# Patient Record
Sex: FEMALE | Race: WHITE | NOT HISPANIC OR LATINO | Employment: OTHER | URBAN - METROPOLITAN AREA
[De-identification: names, ages, dates, MRNs, and addresses within clinical notes are randomized per-mention and may not be internally consistent; named-entity substitution may affect disease eponyms.]

---

## 2019-05-20 ENCOUNTER — EVALUATION (OUTPATIENT)
Dept: OCCUPATIONAL THERAPY | Facility: CLINIC | Age: 79
End: 2019-05-20
Payer: MEDICARE

## 2019-05-20 DIAGNOSIS — M25.531 RIGHT WRIST PAIN: Primary | ICD-10-CM

## 2019-05-20 PROCEDURE — 97535 SELF CARE MNGMENT TRAINING: CPT

## 2019-05-20 PROCEDURE — 97110 THERAPEUTIC EXERCISES: CPT

## 2019-05-20 PROCEDURE — 97166 OT EVAL MOD COMPLEX 45 MIN: CPT

## 2019-05-22 ENCOUNTER — OFFICE VISIT (OUTPATIENT)
Dept: OCCUPATIONAL THERAPY | Facility: CLINIC | Age: 79
End: 2019-05-22
Payer: MEDICARE

## 2019-05-22 DIAGNOSIS — M25.531 RIGHT WRIST PAIN: Primary | ICD-10-CM

## 2019-05-22 PROCEDURE — 97140 MANUAL THERAPY 1/> REGIONS: CPT

## 2019-05-22 PROCEDURE — 97150 GROUP THERAPEUTIC PROCEDURES: CPT

## 2019-05-29 ENCOUNTER — OFFICE VISIT (OUTPATIENT)
Dept: OCCUPATIONAL THERAPY | Facility: CLINIC | Age: 79
End: 2019-05-29
Payer: MEDICARE

## 2019-05-29 DIAGNOSIS — M25.531 RIGHT WRIST PAIN: Primary | ICD-10-CM

## 2019-05-29 PROCEDURE — 97150 GROUP THERAPEUTIC PROCEDURES: CPT

## 2019-05-29 PROCEDURE — 97110 THERAPEUTIC EXERCISES: CPT

## 2019-05-29 PROCEDURE — 97140 MANUAL THERAPY 1/> REGIONS: CPT

## 2019-05-31 ENCOUNTER — OFFICE VISIT (OUTPATIENT)
Dept: OCCUPATIONAL THERAPY | Facility: CLINIC | Age: 79
End: 2019-05-31
Payer: MEDICARE

## 2019-05-31 DIAGNOSIS — M25.531 RIGHT WRIST PAIN: Primary | ICD-10-CM

## 2019-05-31 PROCEDURE — 97140 MANUAL THERAPY 1/> REGIONS: CPT

## 2019-05-31 PROCEDURE — 97110 THERAPEUTIC EXERCISES: CPT

## 2019-06-03 ENCOUNTER — OFFICE VISIT (OUTPATIENT)
Dept: OCCUPATIONAL THERAPY | Facility: CLINIC | Age: 79
End: 2019-06-03
Payer: MEDICARE

## 2019-06-03 DIAGNOSIS — M25.531 RIGHT WRIST PAIN: Primary | ICD-10-CM

## 2019-06-03 PROCEDURE — 97110 THERAPEUTIC EXERCISES: CPT

## 2019-06-05 ENCOUNTER — OFFICE VISIT (OUTPATIENT)
Dept: OCCUPATIONAL THERAPY | Facility: CLINIC | Age: 79
End: 2019-06-05
Payer: MEDICARE

## 2019-06-05 DIAGNOSIS — M25.531 RIGHT WRIST PAIN: Primary | ICD-10-CM

## 2019-06-05 PROCEDURE — 97140 MANUAL THERAPY 1/> REGIONS: CPT

## 2019-06-05 PROCEDURE — 97110 THERAPEUTIC EXERCISES: CPT

## 2019-06-12 ENCOUNTER — OFFICE VISIT (OUTPATIENT)
Dept: OCCUPATIONAL THERAPY | Facility: CLINIC | Age: 79
End: 2019-06-12
Payer: MEDICARE

## 2019-06-12 DIAGNOSIS — M25.531 RIGHT WRIST PAIN: Primary | ICD-10-CM

## 2019-06-12 PROCEDURE — 97110 THERAPEUTIC EXERCISES: CPT

## 2019-06-14 ENCOUNTER — OFFICE VISIT (OUTPATIENT)
Dept: OCCUPATIONAL THERAPY | Facility: CLINIC | Age: 79
End: 2019-06-14
Payer: MEDICARE

## 2019-06-14 DIAGNOSIS — M25.531 RIGHT WRIST PAIN: Primary | ICD-10-CM

## 2019-06-14 PROCEDURE — 97110 THERAPEUTIC EXERCISES: CPT

## 2019-06-17 ENCOUNTER — EVALUATION (OUTPATIENT)
Dept: OCCUPATIONAL THERAPY | Facility: CLINIC | Age: 79
End: 2019-06-17
Payer: MEDICARE

## 2019-06-17 DIAGNOSIS — M25.531 RIGHT WRIST PAIN: Primary | ICD-10-CM

## 2019-06-17 PROCEDURE — 97110 THERAPEUTIC EXERCISES: CPT

## 2019-06-17 PROCEDURE — 97168 OT RE-EVAL EST PLAN CARE: CPT

## 2019-06-19 ENCOUNTER — TRANSCRIBE ORDERS (OUTPATIENT)
Dept: PHYSICAL THERAPY | Facility: CLINIC | Age: 79
End: 2019-06-19

## 2019-06-19 DIAGNOSIS — M25.531 RIGHT WRIST PAIN: Primary | ICD-10-CM

## 2021-11-09 ENCOUNTER — OFFICE VISIT (OUTPATIENT)
Dept: FAMILY MEDICINE CLINIC | Facility: CLINIC | Age: 81
End: 2021-11-09
Payer: MEDICARE

## 2021-11-09 VITALS
HEART RATE: 64 BPM | HEIGHT: 64 IN | SYSTOLIC BLOOD PRESSURE: 122 MMHG | DIASTOLIC BLOOD PRESSURE: 84 MMHG | WEIGHT: 164.6 LBS | TEMPERATURE: 97.8 F | OXYGEN SATURATION: 98 % | BODY MASS INDEX: 28.1 KG/M2

## 2021-11-09 DIAGNOSIS — Z17.0 MALIGNANT NEOPLASM OF UPPER-OUTER QUADRANT OF RIGHT BREAST IN FEMALE, ESTROGEN RECEPTOR POSITIVE (HCC): ICD-10-CM

## 2021-11-09 DIAGNOSIS — R00.1 BRADYCARDIA: ICD-10-CM

## 2021-11-09 DIAGNOSIS — Z00.00 MEDICARE ANNUAL WELLNESS VISIT, INITIAL: ICD-10-CM

## 2021-11-09 DIAGNOSIS — I10 ESSENTIAL HYPERTENSION: Primary | ICD-10-CM

## 2021-11-09 DIAGNOSIS — C50.411 MALIGNANT NEOPLASM OF UPPER-OUTER QUADRANT OF RIGHT BREAST IN FEMALE, ESTROGEN RECEPTOR POSITIVE (HCC): ICD-10-CM

## 2021-11-09 PROCEDURE — 99204 OFFICE O/P NEW MOD 45 MIN: CPT | Performed by: FAMILY MEDICINE

## 2021-11-09 PROCEDURE — 1123F ACP DISCUSS/DSCN MKR DOCD: CPT | Performed by: FAMILY MEDICINE

## 2021-11-09 PROCEDURE — 93000 ELECTROCARDIOGRAM COMPLETE: CPT | Performed by: FAMILY MEDICINE

## 2021-11-09 PROCEDURE — G0438 PPPS, INITIAL VISIT: HCPCS | Performed by: FAMILY MEDICINE

## 2021-11-09 RX ORDER — METOPROLOL SUCCINATE 25 MG/1
25 TABLET, EXTENDED RELEASE ORAL DAILY
Qty: 30 TABLET | Refills: 3 | Status: SHIPPED | OUTPATIENT
Start: 2021-11-09 | End: 2022-02-21

## 2021-11-09 RX ORDER — METOPROLOL SUCCINATE 50 MG/1
50 TABLET, EXTENDED RELEASE ORAL DAILY
Qty: 30 TABLET | Refills: 3
Start: 2021-11-09 | End: 2021-11-09

## 2021-11-09 RX ORDER — ANASTROZOLE 1 MG/1
1 TABLET ORAL DAILY
COMMUNITY
Start: 2021-10-15

## 2021-11-09 RX ORDER — RAMIPRIL 10 MG/1
10 CAPSULE ORAL DAILY
COMMUNITY
End: 2022-03-08 | Stop reason: SDUPTHER

## 2021-11-09 RX ORDER — METOPROLOL TARTRATE 50 MG/1
50 TABLET, FILM COATED ORAL EVERY 12 HOURS SCHEDULED
COMMUNITY
End: 2021-11-09 | Stop reason: SDUPTHER

## 2021-11-09 RX ORDER — ASPIRIN 81 MG/1
81 TABLET ORAL DAILY
COMMUNITY

## 2021-11-09 RX ORDER — ATORVASTATIN CALCIUM 20 MG/1
20 TABLET, FILM COATED ORAL DAILY
COMMUNITY
End: 2022-03-08 | Stop reason: SDUPTHER

## 2021-11-09 RX ORDER — MULTIVITAMIN
1 CAPSULE ORAL DAILY
COMMUNITY

## 2021-11-09 RX ORDER — HYDROCHLOROTHIAZIDE 12.5 MG/1
12.5 TABLET ORAL DAILY
COMMUNITY
End: 2022-03-08 | Stop reason: SDUPTHER

## 2021-12-17 ENCOUNTER — TELEPHONE (OUTPATIENT)
Dept: FAMILY MEDICINE CLINIC | Facility: CLINIC | Age: 81
End: 2021-12-17

## 2022-02-19 DIAGNOSIS — I10 ESSENTIAL HYPERTENSION: ICD-10-CM

## 2022-02-21 RX ORDER — METOPROLOL SUCCINATE 25 MG/1
TABLET, EXTENDED RELEASE ORAL
Qty: 30 TABLET | Refills: 3 | Status: SHIPPED | OUTPATIENT
Start: 2022-02-21 | End: 2022-05-11 | Stop reason: SDUPTHER

## 2022-03-08 DIAGNOSIS — E78.5 HYPERLIPIDEMIA, UNSPECIFIED HYPERLIPIDEMIA TYPE: Primary | ICD-10-CM

## 2022-03-08 DIAGNOSIS — R00.1 BRADYCARDIA: ICD-10-CM

## 2022-03-08 DIAGNOSIS — I10 ESSENTIAL HYPERTENSION: ICD-10-CM

## 2022-03-08 RX ORDER — RAMIPRIL 10 MG/1
10 CAPSULE ORAL DAILY
Qty: 30 CAPSULE | Refills: 2 | Status: SHIPPED | OUTPATIENT
Start: 2022-03-08 | End: 2022-05-11 | Stop reason: SDUPTHER

## 2022-03-08 RX ORDER — HYDROCHLOROTHIAZIDE 12.5 MG/1
12.5 TABLET ORAL DAILY
Qty: 30 TABLET | Refills: 2 | Status: SHIPPED | OUTPATIENT
Start: 2022-03-08 | End: 2022-05-11 | Stop reason: SDUPTHER

## 2022-03-08 RX ORDER — ATORVASTATIN CALCIUM 20 MG/1
20 TABLET, FILM COATED ORAL DAILY
Qty: 30 TABLET | Refills: 2 | Status: SHIPPED | OUTPATIENT
Start: 2022-03-08 | End: 2022-05-11 | Stop reason: SDUPTHER

## 2022-04-11 ENCOUNTER — TELEPHONE (OUTPATIENT)
Dept: FAMILY MEDICINE CLINIC | Facility: CLINIC | Age: 82
End: 2022-04-11

## 2022-04-11 DIAGNOSIS — E78.5 HYPERLIPIDEMIA, UNSPECIFIED HYPERLIPIDEMIA TYPE: Primary | ICD-10-CM

## 2022-04-11 DIAGNOSIS — D64.9 ANEMIA, UNSPECIFIED TYPE: ICD-10-CM

## 2022-04-11 DIAGNOSIS — Z13.1 SCREENING FOR DIABETES MELLITUS: ICD-10-CM

## 2022-05-07 LAB
ALBUMIN SERPL-MCNC: 4.3 G/DL (ref 3.6–4.6)
ALBUMIN/GLOB SERPL: 2 {RATIO} (ref 1.2–2.2)
ALP SERPL-CCNC: 60 IU/L (ref 44–121)
ALT SERPL-CCNC: 34 IU/L (ref 0–32)
AST SERPL-CCNC: 35 IU/L (ref 0–40)
BASOPHILS # BLD AUTO: 0.1 X10E3/UL (ref 0–0.2)
BASOPHILS NFR BLD AUTO: 1 %
BILIRUB SERPL-MCNC: 0.2 MG/DL (ref 0–1.2)
BUN SERPL-MCNC: 16 MG/DL (ref 8–27)
BUN/CREAT SERPL: 21 (ref 12–28)
CALCIUM SERPL-MCNC: 9.3 MG/DL (ref 8.7–10.3)
CHLORIDE SERPL-SCNC: 104 MMOL/L (ref 96–106)
CHOLEST SERPL-MCNC: 153 MG/DL (ref 100–199)
CO2 SERPL-SCNC: 25 MMOL/L (ref 20–29)
CREAT SERPL-MCNC: 0.78 MG/DL (ref 0.57–1)
EGFR: 76 ML/MIN/1.73
EOSINOPHIL # BLD AUTO: 0.1 X10E3/UL (ref 0–0.4)
EOSINOPHIL NFR BLD AUTO: 3 %
ERYTHROCYTE [DISTWIDTH] IN BLOOD BY AUTOMATED COUNT: 12.9 % (ref 11.7–15.4)
GLOBULIN SER-MCNC: 2.2 G/DL (ref 1.5–4.5)
GLUCOSE SERPL-MCNC: 105 MG/DL (ref 65–99)
HCT VFR BLD AUTO: 34.9 % (ref 34–46.6)
HDLC SERPL-MCNC: 42 MG/DL
HGB BLD-MCNC: 12 G/DL (ref 11.1–15.9)
IMM GRANULOCYTES # BLD: 0 X10E3/UL (ref 0–0.1)
IMM GRANULOCYTES NFR BLD: 0 %
LDLC SERPL CALC-MCNC: 92 MG/DL (ref 0–99)
LYMPHOCYTES # BLD AUTO: 1.5 X10E3/UL (ref 0.7–3.1)
LYMPHOCYTES NFR BLD AUTO: 29 %
MCH RBC QN AUTO: 29.3 PG (ref 26.6–33)
MCHC RBC AUTO-ENTMCNC: 34.4 G/DL (ref 31.5–35.7)
MCV RBC AUTO: 85 FL (ref 79–97)
MONOCYTES # BLD AUTO: 0.5 X10E3/UL (ref 0.1–0.9)
MONOCYTES NFR BLD AUTO: 9 %
NEUTROPHILS # BLD AUTO: 3 X10E3/UL (ref 1.4–7)
NEUTROPHILS NFR BLD AUTO: 58 %
PLATELET # BLD AUTO: 143 X10E3/UL (ref 150–450)
POTASSIUM SERPL-SCNC: 4.6 MMOL/L (ref 3.5–5.2)
PROT SERPL-MCNC: 6.5 G/DL (ref 6–8.5)
RBC # BLD AUTO: 4.09 X10E6/UL (ref 3.77–5.28)
SL AMB VLDL CHOLESTEROL CALC: 19 MG/DL (ref 5–40)
SODIUM SERPL-SCNC: 143 MMOL/L (ref 134–144)
TRIGL SERPL-MCNC: 103 MG/DL (ref 0–149)
WBC # BLD AUTO: 5.1 X10E3/UL (ref 3.4–10.8)

## 2022-05-11 ENCOUNTER — OFFICE VISIT (OUTPATIENT)
Dept: FAMILY MEDICINE CLINIC | Facility: CLINIC | Age: 82
End: 2022-05-11
Payer: MEDICARE

## 2022-05-11 VITALS
BODY MASS INDEX: 28.38 KG/M2 | HEART RATE: 49 BPM | TEMPERATURE: 96.6 F | HEIGHT: 64 IN | WEIGHT: 166.2 LBS | OXYGEN SATURATION: 98 % | SYSTOLIC BLOOD PRESSURE: 126 MMHG | DIASTOLIC BLOOD PRESSURE: 82 MMHG

## 2022-05-11 DIAGNOSIS — L98.9 LEG SKIN LESION, LEFT: Primary | ICD-10-CM

## 2022-05-11 DIAGNOSIS — R00.1 BRADYCARDIA: ICD-10-CM

## 2022-05-11 DIAGNOSIS — E28.39 MENOPAUSE OVARIAN FAILURE: ICD-10-CM

## 2022-05-11 DIAGNOSIS — I10 ESSENTIAL HYPERTENSION: ICD-10-CM

## 2022-05-11 DIAGNOSIS — D69.6 PLATELETS DECREASED (HCC): ICD-10-CM

## 2022-05-11 DIAGNOSIS — E78.5 HYPERLIPIDEMIA, UNSPECIFIED HYPERLIPIDEMIA TYPE: ICD-10-CM

## 2022-05-11 PROCEDURE — 99214 OFFICE O/P EST MOD 30 MIN: CPT | Performed by: FAMILY MEDICINE

## 2022-05-11 RX ORDER — TRAVOPROST OPHTHALMIC SOLUTION 0.04 MG/ML
1 SOLUTION OPHTHALMIC DAILY
COMMUNITY
Start: 2022-03-22 | End: 2022-05-11 | Stop reason: SDUPTHER

## 2022-05-11 RX ORDER — PNV NO.95/FERROUS FUM/FOLIC AC 28MG-0.8MG
TABLET ORAL
COMMUNITY
End: 2022-05-11 | Stop reason: SDUPTHER

## 2022-05-11 RX ORDER — ATORVASTATIN CALCIUM 20 MG/1
20 TABLET, FILM COATED ORAL DAILY
Qty: 90 TABLET | Refills: 3 | Status: SHIPPED | OUTPATIENT
Start: 2022-05-11 | End: 2022-08-09

## 2022-05-11 RX ORDER — ASPIRIN 81 MG/1
81 TABLET ORAL
COMMUNITY

## 2022-05-11 RX ORDER — TRAVOPROST OPHTHALMIC SOLUTION 0.04 MG/ML
SOLUTION OPHTHALMIC
COMMUNITY
Start: 2022-03-22

## 2022-05-11 RX ORDER — RAMIPRIL 10 MG/1
10 CAPSULE ORAL DAILY
Qty: 90 CAPSULE | Refills: 3 | Status: SHIPPED | OUTPATIENT
Start: 2022-05-11 | End: 2022-08-09

## 2022-05-11 RX ORDER — HYDROCHLOROTHIAZIDE 12.5 MG/1
12.5 TABLET ORAL DAILY
Qty: 90 TABLET | Refills: 3 | Status: SHIPPED | OUTPATIENT
Start: 2022-05-11 | End: 2022-08-09

## 2022-05-11 RX ORDER — METOPROLOL SUCCINATE 25 MG/1
25 TABLET, EXTENDED RELEASE ORAL DAILY
Qty: 90 TABLET | Refills: 3 | Status: SHIPPED | OUTPATIENT
Start: 2022-05-11 | End: 2022-08-03 | Stop reason: ALTCHOICE

## 2022-05-11 RX ORDER — PRENATAL 168/IRON/FOLIC/OMEGA3 27-800-235
CAPSULE ORAL
COMMUNITY

## 2022-05-11 RX ORDER — ATORVASTATIN CALCIUM 20 MG/1
TABLET, FILM COATED ORAL
COMMUNITY
End: 2022-05-11 | Stop reason: SDUPTHER

## 2022-05-11 NOTE — PROGRESS NOTES
Assessment/Plan:    No problem-specific Assessment & Plan notes found for this encounter  Diagnoses and all orders for this visit:    Leg skin lesion, left  Patient will F/U with dermatology for biopsy    Menopause ovarian failure  -     DXA bone density spine hip and pelvis; Future    Platelets decreased (HCC)  -     CBC and differential; Future    Bradycardia  -     Ambulatory Referral to Cardiology; Future    Essential hypertension  -     ramipril (ALTACE) 10 MG capsule; Take 1 capsule (10 mg total) by mouth in the morning   -     metoprolol succinate (TOPROL-XL) 25 mg 24 hr tablet; Take 1 tablet (25 mg total) by mouth in the morning   -     hydrochlorothiazide (HYDRODIURIL) 12 5 mg tablet; Take 1 tablet (12 5 mg total) by mouth in the morning  Hyperlipidemia, unspecified hyperlipidemia type  -     atorvastatin (LIPITOR) 20 mg tablet; Take 1 tablet (20 mg total) by mouth in the morning  Other orders  -     aspirin (ECOTRIN LOW STRENGTH) 81 mg EC tablet; Take 81 mg by mouth  -     Discontinue: atorvastatin (LIPITOR) 20 mg tablet; atorvastatin 20 mg tablet  -     Calcium-Phosphorus-Vitamin D (Citracal +D3) 250-107-500 MG-MG-UNIT CHEW; Chew  -     Discontinue: Prenatal Vit-Fe Fumarate-FA (Prenatal Vitamin) 27-0 8 MG TABS; Take by mouth  -     travoprost (TRAVATAN-Z) 0 004 % ophthalmic solution; instill 1 drop into both eyes BEFORE BEDTIME  -     Discontinue: travoprost (TRAVATAN-Z) 0 004 % ophthalmic solution; Apply 1 drop to eye in the morning  Follow up in 6 months    Subjective:      Patient ID: Alma Mejia is a 81 y o  female  Patient is here for skin lesion on her left leg that has been changing in color and in size  Also has a Hx of low heart rate denies any fatigue, SOB or weakness  Has hypertension denies any symptoms and takes her medications daily  Borderline low platelets no bleeding episodes        The following portions of the patient's history were reviewed and updated as appropriate:   She  has no past medical history on file  She   Patient Active Problem List    Diagnosis Date Noted    Platelets decreased ([facility] [address] ) 05/11/2022    Leg skin lesion, left 05/11/2022    [facility] upper[facility]outer [facility] right [facility], [facility]09[facility]    Essential hypertension 11/09/2021    Bradycardia 11/09/2021    Medicare annual wellness visit, initial 11/09/2021     She  has no past surgical history on file  Her family history is not on file  She  reports that she has never smoked  She has never used smokeless tobacco  No history on file for alcohol use and drug use  Current Outpatient Medications   Medication Sig Dispense Refill    anastrozole (ARIMIDEX) 1 mg tablet Take 1 mg by mouth daily      aspirin (ECOTRIN LOW STRENGTH) 81 mg EC tablet Take 81 mg by mouth daily      aspirin (ECOTRIN LOW STRENGTH) 81 mg EC tablet Take 81 mg by mouth      atorvastatin (LIPITOR) 20 mg tablet Take 1 tablet (20 mg total) by mouth in the morning  90 tablet 3    Calcium-Phosphorus-Vitamin D (Citracal +D3) 250-107-500 MG-MG-UNIT CHEW Chew      hydrochlorothiazide (HYDRODIURIL) 12 5 mg tablet Take 1 tablet (12 5 mg total) by mouth in the morning  90 tablet 3    metoprolol succinate (TOPROL-XL) 25 mg 24 hr tablet Take 1 tablet (25 mg total) by mouth in the morning  90 tablet 3    Multiple Vitamin (multivitamin) capsule Take 1 capsule by mouth daily      ramipril (ALTACE) 10 MG capsule Take 1 capsule (10 mg total) by mouth in the morning  90 capsule 3    travoprost (TRAVATAN-Z) 0 004 % ophthalmic solution instill 1 drop into both eyes BEFORE BEDTIME       No current facility-administered medications for this visit       Current Outpatient Medications on File Prior to Visit   Medication Sig    anastrozole (ARIMIDEX) 1 mg tablet Take 1 mg by mouth daily    aspirin (ECOTRIN LOW STRENGTH) 81 mg EC tablet Take 81 mg by mouth daily    aspirin (ECOTRIN LOW STRENGTH) 81 mg EC tablet Take 81 mg by mouth    Calcium-Phosphorus-Vitamin D (Citracal +D3) 250-107-500 MG-MG-UNIT CHEW Chew    Multiple Vitamin (multivitamin) capsule Take 1 capsule by mouth daily    travoprost (TRAVATAN-Z) 0 004 % ophthalmic solution instill 1 drop into both eyes BEFORE BEDTIME    [DISCONTINUED] atorvastatin (LIPITOR) 20 mg tablet Take 1 tablet (20 mg total) by mouth daily    [DISCONTINUED] hydrochlorothiazide (HYDRODIURIL) 12 5 mg tablet Take 1 tablet (12 5 mg total) by mouth daily    [DISCONTINUED] metoprolol succinate (TOPROL-XL) 25 mg 24 hr tablet take 1 tablet by mouth daily    [DISCONTINUED] ramipril (ALTACE) 10 MG capsule Take 1 capsule (10 mg total) by mouth daily    [DISCONTINUED] travoprost (TRAVATAN-Z) 0 004 % ophthalmic solution Apply 1 drop to eye in the morning   [DISCONTINUED] atorvastatin (LIPITOR) 20 mg tablet atorvastatin 20 mg tablet    [DISCONTINUED] Prenatal Vit-Fe Fumarate-FA (Prenatal Vitamin) 27-0 8 MG TABS Take by mouth     No current facility-administered medications on file prior to visit  She is allergic to brimonidine       Review of Systems   Constitutional: Negative for activity change, appetite change, fatigue and fever  HENT: Negative for congestion and ear discharge  Respiratory: Negative for cough and shortness of breath  Cardiovascular: Negative for chest pain and palpitations  Gastrointestinal: Negative for diarrhea and nausea  Musculoskeletal: Negative for arthralgias and back pain  Skin: Positive for color change  Negative for rash  Neurological: Negative for dizziness and headaches  Psychiatric/Behavioral: Negative for agitation and behavioral problems           Objective:      /82 (BP Location: Left arm, Patient Position: Sitting, Cuff Size: Large)   Pulse (!) 49   Temp (!) 96 6 °F (35 9 °C)   Ht 5' 4" (1 626 m)   Wt 75 4 kg (166 lb 3 2 oz)   SpO2 98%   BMI 28 53 kg/m²          Physical Exam  Constitutional: General: She is not in acute distress  Appearance: She is well-developed  She is not diaphoretic  HENT:      Head: Normocephalic and atraumatic  Nose: Nose normal    Eyes:      Conjunctiva/sclera: Conjunctivae normal       Pupils: Pupils are equal, round, and reactive to light  Cardiovascular:      Rate and Rhythm: Normal rate and regular rhythm  Heart sounds: Normal heart sounds  No murmur heard  Pulmonary:      Effort: Pulmonary effort is normal  No respiratory distress  Breath sounds: Normal breath sounds  No wheezing  Abdominal:      General: Bowel sounds are normal  There is no distension  Palpations: Abdomen is soft  Tenderness: There is no abdominal tenderness  Skin:     General: Skin is warm and dry  Findings: No erythema or rash  Comments: A slightly raised skin lesion noted on left leg   Neurological:      Mental Status: She is alert and oriented to person, place, and time

## 2022-05-23 ENCOUNTER — TELEPHONE (OUTPATIENT)
Dept: ADMINISTRATIVE | Facility: OTHER | Age: 82
End: 2022-05-23

## 2022-05-23 NOTE — TELEPHONE ENCOUNTER
Upon review of the In Basket request we have found that the patient has aged out of the measure and/or the document date is outside of the measure timeframe  Any additional questions or concerns should be emailed to the Practice Liaisons via [email or web address] email, please do not reply via In Basket      Thank you  Johanna Rae MA

## 2022-05-23 NOTE — TELEPHONE ENCOUNTER
----- Message from Meg Akhtar sent at 5/23/2022  8:39 AM EDT -----  Regarding: [employer] Request  05/23/22 8:39 AM    Hello, our patient Latia Billings has had Mammogram completed/performed  Please assist in updating the patient chart by pulling the Care Everywhere (CE) document  The date of service is 05/20/2022       Thank you,  Meg OLEARY

## 2022-05-23 NOTE — TELEPHONE ENCOUNTER
Upon review of the In Basket request we were able to locate, review, and update the patient chart as requested for Mammogram     Any additional questions or concerns should be emailed to the Practice Liaisons via [email or web address] email, please do not reply via In Basket      Thank you  Saadia Emerson MA

## 2022-06-28 ENCOUNTER — EVALUATION (OUTPATIENT)
Dept: PHYSICAL THERAPY | Facility: CLINIC | Age: 82
End: 2022-06-28
Payer: MEDICARE

## 2022-06-28 DIAGNOSIS — M54.50 CHRONIC LOW BACK PAIN, UNSPECIFIED BACK PAIN LATERALITY, UNSPECIFIED WHETHER SCIATICA PRESENT: ICD-10-CM

## 2022-06-28 DIAGNOSIS — G89.29 CHRONIC LOW BACK PAIN, UNSPECIFIED BACK PAIN LATERALITY, UNSPECIFIED WHETHER SCIATICA PRESENT: ICD-10-CM

## 2022-06-28 DIAGNOSIS — M54.16 RIGHT LUMBAR RADICULOPATHY: Primary | ICD-10-CM

## 2022-06-28 PROCEDURE — 97162 PT EVAL MOD COMPLEX 30 MIN: CPT

## 2022-06-28 PROCEDURE — 97535 SELF CARE MNGMENT TRAINING: CPT

## 2022-06-28 NOTE — LETTER
2022    Arina Castañeda MD  [address] [facility] [address]    Patient: Livia Wolf   YOB: 1940   Date of Visit: 2022     Encounter Diagnosis     ICD-10-CM    1  Right lumbar radiculopathy  M54 16    2  Chronic low back pain, unspecified back pain laterality, unspecified whether sciatica present  M54 50     G89 29        Dear Dr Zaid Frank:    Thank you for your recent referral of Livia Wolf  Please review the attached evaluation summary from Lori's recent visit  Please verify that you agree with the plan of care by signing the attached order  If you have any questions or concerns, please do not hesitate to call  I sincerely appreciate the opportunity to share in the care of one of your patients and hope to have another opportunity to work with you in the near future  Sincerely,    Kristina Lyles, PT      Referring Provider:      I certify that I have read the below Plan of Care and certify the need for these services furnished under this plan of treatment while under my care  Arina Castañeda MD  [address]   [place] [address] [address]  Via Fax: [phone]          PT Evaluation     Today's date: 2022  Patient name: Livia Wolf  : 1940  MRN: [id number]  Referring provider: Harvey Sun MD  Dx:   Encounter Diagnosis     ICD-10-CM    1  Right lumbar radiculopathy  M54 16    2  Chronic low back pain, unspecified back pain laterality, unspecified whether sciatica present  M54 50     G89 29                   Assessment  Assessment details: Pt presents with signs and symptoms consistent with referring diagnosis, sciatica and right SI joint involvement    Impairments: abnormal or restricted ROM, impaired physical strength, lacks appropriate home exercise program, pain with function and poor posture     Symptom irritability: moderateUnderstanding of Dx/Px/POC: good   Prognosis: good    Goals  ST) Pt  Will have centralized radicular symptoms in 6 weeks  2) Pt  Will be able to sit as long as desired without pain in 6 weeks  3)  Pt  Will be able to perform all routine chores at home without pain or limitations in 6 weeks  4) Pt  Will be able to perform all LE dressing tasks without limitations  LT) Pt  Will be timothy to stand as long as desired without pain in 12 weeks  2)  Pt  Will be able to perform all heavy activity at home, such as garbage, moving furniture, carrying weighted items on all surfaces without pain or limitation in 12 weeks  Plan  Patient would benefit from: skilled physical therapy and PT eval  Planned modality interventions: thermotherapy: hydrocollator packs, cryotherapy and unattended electrical stimulation  Planned therapy interventions: manual therapy, neuromuscular re-education, patient education, self care, therapeutic activities, therapeutic exercise and home exercise program  Frequency: 3x week  Duration in weeks: 12  Treatment plan discussed with: patient        Subjective Evaluation    History of Present Illness  Mechanism of injury: Symptom onset approx 3 weeks ago  Woke with increased pain in low back region  Symptoms right sided  Intermittent symptoms to RLE  Intermittent symptoms noted  No left sided symptoms  No hx LBP  Denies altered sensation BLE  Pain  Current pain rating: 3  At best pain rating: 3  At worst pain rating: 10  Location: LBP  Quality: sharp  Relieving factors: rest  Aggravating factors: sitting    Life stress: enjoys gardening, reading,     Patient Goals  Patient goals for therapy: increased strength, decreased pain and increased motion          Objective     Postural Observations  Seated posture: fair    Additional Postural Observation Details  Elevated right pelvis and right shifted    Palpation     Right   Tenderness of the gluteus marilee, gluteus medius, piriformis and quadratus lumborum     Trigger point to gluteus marilee, gluteus medius, piriformis and quadratus lumborum  Tenderness     Right Hip   Tenderness in the PSIS  Neurological Testing     Additional Neurological Details  Unremarkable    Active Range of Motion     Lumbar   Flexion:  Restriction level: minimal  Extension:  Restriction level: moderate  Left rotation:  Restriction level: moderate  Right rotation:  Restriction level: moderate    Joint Play     Hypomobile: L2, L3, L4 and L5     Muscle Activation   Patient able to activate left transverse abdominals, left internal obliques, left multifidus, right transverse abdominals, right internal obliques and right multifidus  Tests     Lumbar   Positive sacroiliac distraction   Right   Positive slump test    Negative passive SLR and quadrant  Right Hip   Positive long sit              Precautions: Hx of CA       Manuals 6-28-22                                       Neuro Re-Ed                                                 Pt Ed/ HEP Pathology and involved anatomy along with issuing and reviewing of HEP 15'               Ther Ex                                                                        Ther Activity                        Gait Training                        Modalities

## 2022-06-28 NOTE — PROGRESS NOTES
PT Evaluation     Today's date: 2022  Patient name: Prema Giraldo  : 1940  MRN: [id number]  Referring provider: Remedios Ríos MD  Dx:   Encounter Diagnosis     ICD-10-CM    1  Right lumbar radiculopathy  M54 16    2  Chronic low back pain, unspecified back pain laterality, unspecified whether sciatica present  M54 50     G89 29                   Assessment  Assessment details: Pt presents with signs and symptoms consistent with referring diagnosis, sciatica and right SI joint involvement  Impairments: abnormal or restricted ROM, impaired physical strength, lacks appropriate home exercise program, pain with function and poor posture     Symptom irritability: moderateUnderstanding of Dx/Px/POC: good   Prognosis: good    Goals  ST) Pt  Will have centralized radicular symptoms in 6 weeks  2) Pt  Will be able to sit as long as desired without pain in 6 weeks  3)  Pt  Will be able to perform all routine chores at home without pain or limitations in 6 weeks  4) Pt  Will be able to perform all LE dressing tasks without limitations  LT) Pt  Will be timothy to stand as long as desired without pain in 12 weeks  2)  Pt  Will be able to perform all heavy activity at home, such as garbage, moving furniture, carrying weighted items on all surfaces without pain or limitation in 12 weeks  Plan  Patient would benefit from: skilled physical therapy and PT eval  Planned modality interventions: thermotherapy: hydrocollator packs, cryotherapy and unattended electrical stimulation  Planned therapy interventions: manual therapy, neuromuscular re-education, patient education, self care, therapeutic activities, therapeutic exercise and home exercise program  Frequency: 3x week  Duration in weeks: 12  Treatment plan discussed with: patient        Subjective Evaluation    History of Present Illness  Mechanism of injury: Symptom onset approx 3 weeks ago  Woke with increased pain in low back region    Symptoms right sided  Intermittent symptoms to RLE  Intermittent symptoms noted  No left sided symptoms  No hx LBP  Denies altered sensation BLE  Pain  Current pain rating: 3  At best pain rating: 3  At worst pain rating: 10  Location: LBP  Quality: sharp  Relieving factors: rest  Aggravating factors: sitting    Life stress: enjoys gardening, reading,     Patient Goals  Patient goals for therapy: increased strength, decreased pain and increased motion          Objective     Postural Observations  Seated posture: fair    Additional Postural Observation Details  Elevated right pelvis and right shifted    Palpation     Right   Tenderness of the gluteus marilee, gluteus medius, piriformis and quadratus lumborum  Trigger point to gluteus marilee, gluteus medius, piriformis and quadratus lumborum  Tenderness     Right Hip   Tenderness in the PSIS  Neurological Testing     Additional Neurological Details  Unremarkable    Active Range of Motion     Lumbar   Flexion:  Restriction level: minimal  Extension:  Restriction level: moderate  Left rotation:  Restriction level: moderate  Right rotation:  Restriction level: moderate    Joint Play     Hypomobile: L2, L3, L4 and L5     Muscle Activation   Patient able to activate left transverse abdominals, left internal obliques, left multifidus, right transverse abdominals, right internal obliques and right multifidus  Tests     Lumbar   Positive sacroiliac distraction   Right   Positive slump test    Negative passive SLR and quadrant  Right Hip   Positive long sit              Precautions: Hx of CA       Manuals 6-28-22                                       Neuro Re-Ed                                                 Pt Ed/ HEP Pathology and involved anatomy along with issuing and reviewing of HEP 15'               Ther Ex                                                                        Ther Activity                        Gait Training Modalities

## 2022-06-30 ENCOUNTER — OFFICE VISIT (OUTPATIENT)
Dept: PHYSICAL THERAPY | Facility: CLINIC | Age: 82
End: 2022-06-30
Payer: MEDICARE

## 2022-06-30 DIAGNOSIS — M54.16 RIGHT LUMBAR RADICULOPATHY: Primary | ICD-10-CM

## 2022-06-30 DIAGNOSIS — M54.50 CHRONIC LOW BACK PAIN, UNSPECIFIED BACK PAIN LATERALITY, UNSPECIFIED WHETHER SCIATICA PRESENT: ICD-10-CM

## 2022-06-30 DIAGNOSIS — G89.29 CHRONIC LOW BACK PAIN, UNSPECIFIED BACK PAIN LATERALITY, UNSPECIFIED WHETHER SCIATICA PRESENT: ICD-10-CM

## 2022-06-30 PROCEDURE — 97010 HOT OR COLD PACKS THERAPY: CPT

## 2022-06-30 PROCEDURE — 97110 THERAPEUTIC EXERCISES: CPT

## 2022-06-30 NOTE — PROGRESS NOTES
Daily Note     Today's date: 2022  Patient name: Rupa Barnard  : 1940  MRN: [id number]  Referring provider: Cami Cooley MD  Dx:   Encounter Diagnosis     ICD-10-CM    1  Right lumbar radiculopathy  M54 16    2  Chronic low back pain, unspecified back pain laterality, unspecified whether sciatica present  M54 50     G89 29                   Subjective: Pt reports actually feeling a little better after the first therapy session  Objective: See treatment diary below      Assessment: Tolerated treatment well  Patient would benefit from continued PT      Plan: Progress treatment as tolerated         Precautions: Hx of CA       Manuals 22                                      Neuro Re-Ed                                                 Pt Ed/ HEP Pathology and involved anatomy along with issuing and reviewing of HEP 15'               Ther Ex        Nu Step  L4 10'      LTR  10" x12 fadumo      PPT  10" 2x10      SLTR-glute stretch  10" x12 fadumo      DKTC  10" 12x      Sciatic swing glides  4'      Long sitting hamstring stretch  4'              Ther Activity                        Gait Training                        Modalities        MHP  Seated 12'

## 2022-07-01 ENCOUNTER — TELEPHONE (OUTPATIENT)
Dept: INFUSION CENTER | Facility: CLINIC | Age: 82
End: 2022-07-01

## 2022-07-01 ENCOUNTER — TELEPHONE (OUTPATIENT)
Dept: FAMILY MEDICINE CLINIC | Facility: CLINIC | Age: 82
End: 2022-07-01

## 2022-07-01 ENCOUNTER — TELEMEDICINE (OUTPATIENT)
Dept: FAMILY MEDICINE CLINIC | Facility: CLINIC | Age: 82
End: 2022-07-01
Payer: MEDICARE

## 2022-07-01 DIAGNOSIS — Z17.0 MALIGNANT NEOPLASM OF UPPER-OUTER QUADRANT OF RIGHT BREAST IN FEMALE, ESTROGEN RECEPTOR POSITIVE (HCC): ICD-10-CM

## 2022-07-01 DIAGNOSIS — H40.1194 PRIMARY OPEN-ANGLE GLAUCOMA, INDETERMINATE STAGE, UNSPECIFIED LATERALITY: ICD-10-CM

## 2022-07-01 DIAGNOSIS — I11.9 HYPERTENSIVE HEART DISEASE WITHOUT HEART FAILURE: ICD-10-CM

## 2022-07-01 DIAGNOSIS — R00.1 BRADYCARDIA: ICD-10-CM

## 2022-07-01 DIAGNOSIS — C50.411 MALIGNANT NEOPLASM OF UPPER-OUTER QUADRANT OF RIGHT BREAST IN FEMALE, ESTROGEN RECEPTOR POSITIVE (HCC): ICD-10-CM

## 2022-07-01 DIAGNOSIS — D69.6 PLATELETS DECREASED (HCC): ICD-10-CM

## 2022-07-01 DIAGNOSIS — U07.1 COVID-19: Primary | ICD-10-CM

## 2022-07-01 DIAGNOSIS — I10 ESSENTIAL HYPERTENSION: ICD-10-CM

## 2022-07-01 PROBLEM — Z85.828 HISTORY OF BASAL CELL CANCER: Status: ACTIVE | Noted: 2020-01-21

## 2022-07-01 PROCEDURE — 99214 OFFICE O/P EST MOD 30 MIN: CPT | Performed by: NURSE PRACTITIONER

## 2022-07-01 RX ORDER — ONDANSETRON 2 MG/ML
4 INJECTION INTRAMUSCULAR; INTRAVENOUS ONCE AS NEEDED
Status: CANCELLED | OUTPATIENT
Start: 2022-07-02

## 2022-07-01 RX ORDER — BEBTELOVIMAB 87.5 MG/ML
175 INJECTION, SOLUTION INTRAVENOUS ONCE
Status: CANCELLED | OUTPATIENT
Start: 2022-07-01

## 2022-07-01 RX ORDER — ALBUTEROL SULFATE 90 UG/1
3 AEROSOL, METERED RESPIRATORY (INHALATION) ONCE AS NEEDED
Status: CANCELLED | OUTPATIENT
Start: 2022-07-02

## 2022-07-01 RX ORDER — SODIUM CHLORIDE 9 MG/ML
20 INJECTION, SOLUTION INTRAVENOUS ONCE
Status: CANCELLED | OUTPATIENT
Start: 2022-07-02

## 2022-07-01 RX ORDER — ACETAMINOPHEN 325 MG/1
650 TABLET ORAL ONCE AS NEEDED
Status: CANCELLED | OUTPATIENT
Start: 2022-07-02

## 2022-07-01 NOTE — TELEPHONE ENCOUNTER
Patient requested to be seen virtually by our office being that she is unable to get in touch with Jaymie due to a phone outage  Tomasz Liao is more than willing to see her to help, patient informed and set up for 2:20 today so medication can be started if needed

## 2022-07-01 NOTE — Clinical Note
Bakari Hawley  I am the NP at [facility] practice  Friday your phones were down in the office ?  I guess pt tried to call your office multiple times so she called us  I ended up doing virtual visit with her and ordered monoclonal antibody as I was worried that she may not be contacted on time Review and let me know if any questions and if your office can follow up on Tuesday with her please

## 2022-07-01 NOTE — PROGRESS NOTES
COVID-19 Outpatient Progress Note    Assessment/Plan:    Pt of Dr Darrell Hawley PCP   Was trying to call his office  But could not get in touch with her PCPs  office after multiple attempts- she reached out and called our office multiple times and after multiple attempts from my staff to get in touch with PCPs office we placed her on a virtual visit for consultation     She started having sore throat congestion and headaches yesterday 6/30 tested with home test and was positive for COVID   She has underlying medical history of HTN , history of bradycardia and breast cancer   She is done with treatments however continues to take Arimadex  Discussed Paxlovid vs monoclonal antibody   I believe she is a great candidate for monoclonal antibody treatments   In agreement to get treatment scheduled for tomorrow Saturday at [facility], She was informed to bring her positive test with her   For now add vitamin C zinc and Viatmin D hydrate well   Tylenol and Motrin as needed for symptoms and fevers   We will follow up in 2-3 days   Discussed reportable s/s and when to seek urgent care     Problem List Items Addressed This Visit        Cardiovascular and Mediastinum    Essential hypertension    HCVD (hypertensive cardiovascular disease)       Other    Malignant neoplasm of upper-outer quadrant of right breast in female, estrogen receptor positive ([address] )    Bradycardia    Platelets decreased ([facility] )    POAG (primary open-angle glaucoma)    COVID-19 - Primary         Disposition:     Patient has COVID-19 infection  Based off CDC guidelines, they were recommended to isolate for 5 days from the date of the positive test  If they remain asymptomatic, isolation may be ended followed by 5 days of wearing a mask when around othes to minimize risk of infecting others  If they have a fever, continue to stay home until fever resolves for at least 24 hours   I recommended continued isolation until at least 24 hours have passed since recovery defined as resolution of fever without the use of fever-reducing medications AND improvement in COVID symptoms AND 10 days have passed since onset of symptoms (or 10 days have passed since date of first positive viral diagnostic test for asymptomatic patients)  Discussed symptom directed medication options with patient  Discussed vitamin D, vitamin C, and/or zinc supplementation with patient  Patient meets criteria for Bebtelovimab infusion  They were counseled in regards to risks, benefits, and side effects of this infusion  Dauna Channel is an investigational medicine used to treat mild-to-moderate symptoms of COVID-19 in adults and children (15years of age and older weighing at least 80 pounds (40 kg)) with positive results of direct SARS-CoV-2 viral testing, and who are at high risk of progression to severe COVID-19, including hospitalization or death, and for whom other COVID-19 treatment options approved or authorized by FDA are not available or clinically appropriate  Bebtelovimab is investigational because it is still being studied  There is limited information about the safety and effectiveness of using bebtelovimab to treat people with mild-to-moderate COVID-19  The FDA has authorized the emergency use of bebtelovimab for the treatment of COVID-19 under an Emergency Use Authorization (EUA)       Dauna Channel is not authorized for use in people who:  - are likely to be infected with a SARS-CoV-2 variant that is not able to be treated by bebtelovimab based on the circulating variants in your area (ask your health care provider about FDA and CDCs latest information on circulating variants by geographic area), or  - are hospitalized due to COVID-19, or  - require oxygen therapy and/or respiratory support due to COVID-19, or  - require an increase in baseline oxygen flow rate and/or respiratory support due to [address] and are on chronic oxygen therapy and/or respiratory support due to underlying nonCOVID-19 related comorbidity  How will I receive Bebtelovimab? Manuelito Flies will be given as an injection through a vein (intravenously or IV) over at least 30 seconds  You will be observed by your healthcare provider for at least 1 hour after you receive bebtelovimab  Possible side effects of Bebtelovimab: Allergic reactions can happen during and after infusion with bebtelovimab  Possible reactions include: fever, chills, nausea, headache, shortness of breath, low or high blood pressure, rapid or slow heart rate, chest discomfort or pain, weakness, confusion, feeling tired, wheezing, swelling of your lips, face, or throat, rash including hives, itching, muscle aches, dizziness, and sweating  These reactions may be severe or life threatening  Worsening symptoms after treatment: You may experience new or worsening symptoms after infusion, including fever, difficulty breathing, rapid or slow heart rate, tiredness, weakness or confusion  If these occur, contact your healthcare provider or seek immediate medical attention as some of these events have required hospitalization  It is unknown if these events are related to treatment or are due to the progression of COVID19  The side effects of getting any medicine by vein may include brief pain, bleeding, bruising of the skin, soreness, swelling, and possible infection at the infusion site  These are not all the possible side effects of bebtelovimab  Not a lot of people have been given bebtelovimab  Serious and unexpected side effects may happen  Bebtelovimab are still being studied so it is possible that all of the risks are not known at this time  It is possible that bebtelovimab could interfere with your body's own ability to fight off a future infection of SARS-CoV-2  Similarly, bebtelovimab may reduce your bodys immune response to a vaccine for SARS-CoV-2  Specific studies have not been conducted to address these possible risks   Talk to your healthcare provider if you have any questions  Emergency Use Authorization:    The [facility] FDA has made bebtelovimab available under an emergency access mechanism called an EUA  The EUA is supported by a  of Health and Human Service ([facility]) declaration that circumstances exist to justify the emergency use of drugs and biological products during the COVID-19 pandemic  Bebtelovimab have not undergone the same type of review as an FDA-approved or cleared product  The FDA may issue an EUA when certain criteria are met, which includes that there are no adequate, approved, and available alternatives  In addition, the FDA decision is based on the totality of scientific evidence available showing that it is reasonable to believe that the product meets certain criteria for safety, performance, and labeling and may be effective in treatment of patients during the COVID-19 pandemic  All of these criteria must be met to allow for the product to be used in the treatment of patients during the COVID-19 pandemic  The EUA for bebtelovimab together is in effect for the duration of the COVID-19 declaration justifying emergency use of these products, unless terminated or revoked (after which the product may no longer be used)  What if I am pregnant or breastfeeding? There is no experience treating pregnant women or breastfeeding mothers with bebtelovimab  For a mother and unborn baby, the benefit of receiving bebtelovimab may be greater than the risk from the treatment  If you are pregnant or breastfeeding, discuss your options and specific situation with your healthcare provider  How do I report side effects with Bebtelovimab? Contact your healthcare provider if you have any side effects that bother you or do not go away  Report side effects to FDA MedWatch at www fda gov/medwatch, or call 3-752-UII-8041 or to [address]  as shown below      Email: [email or web address]  com   Fax number: [phone]   Telephone number: 4-797-SISVGY96 ([phone])    Full fact sheet document for patients can be found at: [employer]    The patient consents to proceed with bebtelovimab infusion  I have spent 25 minutes directly with the patient  Greater than 50% of this time was spent in counseling/coordination of care regarding: diagnostic results, prognosis, risks and benefits of treatment options, instructions for management, patient and family education, importance of treatment compliance, risk factor reductions and impressions  Monoclonal antibody      Encounter provider PAVAN Huynh    Provider located at [address] [place] [id number] [phone]    Recent Visits  No visits were found meeting these conditions  Showing recent visits within past 7 days and meeting all other requirements  Today's Visits  Date Type Provider Dept   07/01/22 Telemedicine Jose Huynh7 Kelton Rodriguez   07/01/22 Telephone [address] [facility] today's visits and meeting all other requirements  Future Appointments  No visits were found meeting these conditions  Showing future appointments within next 150 days and meeting all other requirements     This virtual check-in was done via [employer] and patient was informed that this is a secure, HIPAA-compliant platform  She agrees to proceed  Patient agrees to participate in a virtual check in via telephone or video visit instead of presenting to the office to address urgent/immediate medical needs  Patient is aware this is a billable service  After connecting through [facility], the patient was identified by name and date of birth  Romero Peralta was informed that this was a telemedicine visit and that the exam was being conducted confidentially over secure lines   My office door was closed  No one else was in the room  Billy Daniel acknowledged consent and understanding of privacy and security of the telemedicine visit  I informed the patient that I have reviewed her record in Epic and presented the opportunity for her to ask any questions regarding the visit today  The patient agreed to participate  Verification of patient location:  Patient is located in the following state in which I hold an active license: [place]    Subjective:   Billy Daniel is a 81 y o  female who has been screened for COVID-19  Symptom change since last report: unchanged  Patient's symptoms include fatigue, nasal congestion, rhinorrhea, sore throat, cough and headache  Patient denies nausea and vomiting      - Date of symptom onset: 6/30/2022  - Date of positive COVID-19 test: 6/30/2022  Type of test: Home antigen  COVID-19 vaccination status: Fully vaccinated with booster    Anastasia Gusman has been staying home and has isolated themselves in her home  She is taking care to not share personal items and is cleaning all surfaces that are touched often, like counters, tabletops, and doorknobs using household cleaning sprays or wipes  She is wearing a mask when she leaves her room  Lab Results   Component Value Date    SARSCOV2 Negative 01/25/2022     Past Medical History:   Diagnosis Date    Breast cancer ([facility] [address] )     Cancer [facility]     Hyperlipidemia, unspecified     Hypertension      Past Surgical History:   Procedure Laterality Date    BREAST LUMPECTOMY      BREAST SURGERY      EYE SURGERY      HYSTERECTOMY       Current Outpatient Medications   Medication Sig Dispense Refill    anastrozole (ARIMIDEX) 1 mg tablet Take 1 mg by mouth daily      aspirin (ECOTRIN LOW STRENGTH) 81 mg EC tablet Take 81 mg by mouth daily      aspirin (ECOTRIN LOW STRENGTH) 81 mg EC tablet Take 81 mg by mouth      atorvastatin (LIPITOR) 20 mg tablet Take 1 tablet (20 mg total) by mouth in the morning   90 tablet 3    Calcium-Phosphorus-Vitamin D (Citracal +D3) 250-107-500 MG-MG-UNIT CHEW Chew      hydrochlorothiazide (HYDRODIURIL) 12 5 mg tablet Take 1 tablet (12 5 mg total) by mouth in the morning  90 tablet 3    metoprolol succinate (TOPROL-XL) 25 mg 24 hr tablet Take 1 tablet (25 mg total) by mouth in the morning  90 tablet 3    Multiple Vitamin (multivitamin) capsule Take 1 capsule by mouth daily      ramipril (ALTACE) 10 MG capsule Take 1 capsule (10 mg total) by mouth in the morning  90 capsule 3    travoprost (TRAVATAN-Z) 0 004 % ophthalmic solution instill 1 drop into both eyes BEFORE BEDTIME       No current facility-administered medications for this visit  Allergies   Allergen Reactions    Brimonidine Other (See Comments)     Eye lashes fell off       Review of Systems   Constitutional: Positive for fatigue  HENT: Positive for congestion, rhinorrhea and sore throat  Respiratory: Positive for cough  Gastrointestinal: Negative for abdominal distention, nausea and vomiting  Musculoskeletal: Positive for arthralgias  Skin: Negative for rash  Neurological: Positive for headaches  Psychiatric/Behavioral: Negative for sleep disturbance and suicidal ideas  The patient is nervous/anxious  Objective: There were no vitals filed for this visit  Physical Exam  Constitutional:       Appearance: She is well-developed  HENT:      Head: Atraumatic  Pulmonary:      Effort: Pulmonary effort is normal    Neurological:      Mental Status: She is alert and oriented to person, place, and time  Psychiatric:         Mood and Affect: Mood normal          Behavior: Behavior normal          VIRTUAL VISIT DISCLAIMER    Jerad Cat verbally agrees to participate in [employer]   Pt is aware that [employer] could be limited without vital signs or the ability to perform a full hands-on physical Dearicha Lou understands she or the provider may request at any time to terminate the video visit and request the patient to seek care or treatment in person

## 2022-07-01 NOTE — TELEPHONE ENCOUNTER
I TT Yomaira at this time and confirmed that pt has a positive at home test that she will bring with her to infusion  Aware that pt can not be treated with out this

## 2022-07-02 ENCOUNTER — HOSPITAL ENCOUNTER (OUTPATIENT)
Dept: INFUSION CENTER | Facility: HOSPITAL | Age: 82
Discharge: HOME | End: 2022-07-02
Payer: MEDICARE

## 2022-07-02 VITALS
OXYGEN SATURATION: 96 % | TEMPERATURE: 97.2 F | HEART RATE: 54 BPM | DIASTOLIC BLOOD PRESSURE: 73 MMHG | SYSTOLIC BLOOD PRESSURE: 129 MMHG

## 2022-07-02 DIAGNOSIS — R00.1 BRADYCARDIA: ICD-10-CM

## 2022-07-02 DIAGNOSIS — I11.9 HYPERTENSIVE HEART DISEASE WITHOUT HEART FAILURE: ICD-10-CM

## 2022-07-02 DIAGNOSIS — I10 ESSENTIAL HYPERTENSION: ICD-10-CM

## 2022-07-02 DIAGNOSIS — U07.1 COVID-19: Primary | ICD-10-CM

## 2022-07-02 DIAGNOSIS — C50.411 MALIGNANT NEOPLASM OF UPPER-OUTER QUADRANT OF RIGHT BREAST IN FEMALE, ESTROGEN RECEPTOR POSITIVE (HCC): ICD-10-CM

## 2022-07-02 DIAGNOSIS — Z17.0 MALIGNANT NEOPLASM OF UPPER-OUTER QUADRANT OF RIGHT BREAST IN FEMALE, ESTROGEN RECEPTOR POSITIVE (HCC): ICD-10-CM

## 2022-07-02 PROCEDURE — M0222 HB BEBTELOVIMAB INJECTION: HCPCS | Performed by: FAMILY MEDICINE

## 2022-07-02 RX ORDER — SODIUM CHLORIDE 9 MG/ML
20 INJECTION, SOLUTION INTRAVENOUS ONCE
Status: CANCELLED | OUTPATIENT
Start: 2022-07-02

## 2022-07-02 RX ORDER — ACETAMINOPHEN 325 MG/1
650 TABLET ORAL ONCE AS NEEDED
Status: DISCONTINUED | OUTPATIENT
Start: 2022-07-02 | End: 2022-07-05 | Stop reason: HOSPADM

## 2022-07-02 RX ORDER — ALBUTEROL SULFATE 90 UG/1
3 AEROSOL, METERED RESPIRATORY (INHALATION) ONCE AS NEEDED
Status: CANCELLED | OUTPATIENT
Start: 2022-07-02

## 2022-07-02 RX ORDER — BEBTELOVIMAB 87.5 MG/ML
175 INJECTION, SOLUTION INTRAVENOUS ONCE
Status: CANCELLED | OUTPATIENT
Start: 2022-07-02

## 2022-07-02 RX ORDER — ONDANSETRON 2 MG/ML
4 INJECTION INTRAMUSCULAR; INTRAVENOUS ONCE AS NEEDED
Status: DISCONTINUED | OUTPATIENT
Start: 2022-07-02 | End: 2022-07-05 | Stop reason: HOSPADM

## 2022-07-02 RX ORDER — ONDANSETRON 2 MG/ML
4 INJECTION INTRAMUSCULAR; INTRAVENOUS ONCE AS NEEDED
Status: CANCELLED | OUTPATIENT
Start: 2022-07-02

## 2022-07-02 RX ORDER — SODIUM CHLORIDE 9 MG/ML
20 INJECTION, SOLUTION INTRAVENOUS ONCE
Status: DISCONTINUED | OUTPATIENT
Start: 2022-07-02 | End: 2022-07-05 | Stop reason: HOSPADM

## 2022-07-02 RX ORDER — BEBTELOVIMAB 87.5 MG/ML
175 INJECTION, SOLUTION INTRAVENOUS ONCE
Status: COMPLETED | OUTPATIENT
Start: 2022-07-02 | End: 2022-07-02

## 2022-07-02 RX ORDER — ACETAMINOPHEN 325 MG/1
650 TABLET ORAL ONCE AS NEEDED
Status: CANCELLED | OUTPATIENT
Start: 2022-07-02

## 2022-07-02 RX ORDER — ALBUTEROL SULFATE 90 UG/1
3 AEROSOL, METERED RESPIRATORY (INHALATION) ONCE AS NEEDED
Status: DISCONTINUED | OUTPATIENT
Start: 2022-07-02 | End: 2022-07-05 | Stop reason: HOSPADM

## 2022-07-02 RX ADMIN — BEBTELOVIMAB 175 MG: 87.5 INJECTION, SOLUTION INTRAVENOUS at 08:50

## 2022-07-02 NOTE — PROGRESS NOTES
Patient tolerated monoclonal antibody infusion and 1 hour post observation without incident  IV discontinued, catheter intact  Gauze applied to site  Discharge instructions reviewed with patient verbally  Patient discharged without incident    Patient will f/u with PCP on Tuesday

## 2022-07-02 NOTE — PROGRESS NOTES
Bebtelovimab administered over 30 seconds and flushed slowing over 30 seconds  Patient tolerated well  Observation period of one hour initiated  Patient advised to verbalize any new symptoms

## 2022-07-02 NOTE — PROGRESS NOTES
Patient presents for monoclonal antibody treatment  Reviewed EUA, PIV established  Verbal consent given    Patient presented positive home test

## 2022-07-05 ENCOUNTER — APPOINTMENT (OUTPATIENT)
Dept: PHYSICAL THERAPY | Facility: CLINIC | Age: 82
End: 2022-07-05
Payer: MEDICARE

## 2022-07-07 ENCOUNTER — APPOINTMENT (OUTPATIENT)
Dept: PHYSICAL THERAPY | Facility: CLINIC | Age: 82
End: 2022-07-07
Payer: MEDICARE

## 2022-07-12 ENCOUNTER — OFFICE VISIT (OUTPATIENT)
Dept: PHYSICAL THERAPY | Facility: CLINIC | Age: 82
End: 2022-07-12
Payer: MEDICARE

## 2022-07-12 DIAGNOSIS — M54.50 CHRONIC LOW BACK PAIN, UNSPECIFIED BACK PAIN LATERALITY, UNSPECIFIED WHETHER SCIATICA PRESENT: ICD-10-CM

## 2022-07-12 DIAGNOSIS — G89.29 CHRONIC LOW BACK PAIN, UNSPECIFIED BACK PAIN LATERALITY, UNSPECIFIED WHETHER SCIATICA PRESENT: ICD-10-CM

## 2022-07-12 DIAGNOSIS — M54.16 RIGHT LUMBAR RADICULOPATHY: Primary | ICD-10-CM

## 2022-07-12 PROCEDURE — 97530 THERAPEUTIC ACTIVITIES: CPT

## 2022-07-12 PROCEDURE — 97110 THERAPEUTIC EXERCISES: CPT

## 2022-07-12 NOTE — PROGRESS NOTES
Daily Note     Today's date: 2022  Patient name: Mohan Hyde  : 1940  MRN: [id number]  Referring provider: Jayde Pfeiffer MD  Dx:   Encounter Diagnosis     ICD-10-CM    1  Right lumbar radiculopathy  M54 16    2  Chronic low back pain, unspecified back pain laterality, unspecified whether sciatica present  M54 50     G89 29                   Subjective: No new complaints    Objective: See treatment diary below      Assessment: Tolerated treatment well  Patient would benefit from continued PT      Plan: Progress treatment as tolerated         Precautions: Hx of CA       Manuals 22                                     Neuro Re-Ed                                                 Pt Ed/ HEP Pathology and involved anatomy along with issuing and reviewing of HEP 15'               Ther Ex        Nu Step  L4 10' L4 10'     LTR  10" x12 fadumo 10" x12     PPT  10" 2x10 10" 2x10     SLTR-glute stretch  10" x12 fadumo 10" x12 fadumo     DKTC  10" 12x 10" x12     Sciatic swing glides  4' 4'     Long sitting hamstring stretch  4' 4'             Ther Activity        PPT w/ marches 3x10  3x10     Isometric powerball crunches   5" 15x                                                     Gait Training                        Modalities        MHP  Seated 12' 12'

## 2022-07-14 ENCOUNTER — OFFICE VISIT (OUTPATIENT)
Dept: PHYSICAL THERAPY | Facility: CLINIC | Age: 82
End: 2022-07-14
Payer: MEDICARE

## 2022-07-14 DIAGNOSIS — M54.50 CHRONIC LOW BACK PAIN, UNSPECIFIED BACK PAIN LATERALITY, UNSPECIFIED WHETHER SCIATICA PRESENT: ICD-10-CM

## 2022-07-14 DIAGNOSIS — M54.16 RIGHT LUMBAR RADICULOPATHY: Primary | ICD-10-CM

## 2022-07-14 DIAGNOSIS — G89.29 CHRONIC LOW BACK PAIN, UNSPECIFIED BACK PAIN LATERALITY, UNSPECIFIED WHETHER SCIATICA PRESENT: ICD-10-CM

## 2022-07-14 PROCEDURE — 97110 THERAPEUTIC EXERCISES: CPT

## 2022-07-14 NOTE — PROGRESS NOTES
Daily Note     Today's date: 2022  Patient name: Oral Casiano  : 1940  MRN: [id number]  Referring provider: Sam Borden MD  Dx:   Encounter Diagnosis     ICD-10-CM    1  Right lumbar radiculopathy  M54 16    2  Chronic low back pain, unspecified back pain laterality, unspecified whether sciatica present  M54 50     G89 29                   Subjective: Conrad Dickson reports no radicular symptoms today  She reports improvement overall in low back since coming to physical therapy  Objective: See treatment diary below      Assessment: Visual and VC to ensure correct exercise technique; denied any increases in pain but did have difficulty going from long sit to supine; symptoms did subside with rest  Progress as able  Plan: Continue with current POC to address pt deficits        Precautions: Hx of CA       Manuals 22                                    Neuro Re-Ed                                                 Pt Ed/ HEP Pathology and involved anatomy along with issuing and reviewing of HEP 15'               Ther Ex        Nu Step  L4 10' L4 10' L4 10'    LTR  10" x12 fadumo 10" x12 10"x12 fadumo     PPT  10" 2x10 10" 2x10 10"x20    SLTR-glute stretch  10" x12 fadumo 10" x12 fadumo 10"x12 fadumo     DKTC  10" 12x 10" x12 10"x12    Sciatic swing glides  4' 4' 4'     Long sitting hamstring stretch  4' 4' 4'            Ther Activity        PPT w/ marches 3x10  3x10 3x10 fadumo     Isometric powerball crunches   5" 15x 5" x15                                                    Gait Training                        Modalities        MHP  Seated 12' 12' 12'

## 2022-07-18 ENCOUNTER — OFFICE VISIT (OUTPATIENT)
Dept: PHYSICAL THERAPY | Facility: CLINIC | Age: 82
End: 2022-07-18
Payer: MEDICARE

## 2022-07-18 DIAGNOSIS — M54.16 RIGHT LUMBAR RADICULOPATHY: ICD-10-CM

## 2022-07-18 DIAGNOSIS — G89.29 CHRONIC LOW BACK PAIN, UNSPECIFIED BACK PAIN LATERALITY, UNSPECIFIED WHETHER SCIATICA PRESENT: Primary | ICD-10-CM

## 2022-07-18 DIAGNOSIS — M54.50 CHRONIC LOW BACK PAIN, UNSPECIFIED BACK PAIN LATERALITY, UNSPECIFIED WHETHER SCIATICA PRESENT: Primary | ICD-10-CM

## 2022-07-18 PROCEDURE — 97110 THERAPEUTIC EXERCISES: CPT | Performed by: PHYSICAL THERAPIST

## 2022-07-18 NOTE — PROGRESS NOTES
Daily Note     Today's date: 2022  Patient name: Fang Pagan  : 1940  MRN: [id number]  Referring provider: Manuel Lee MD  Dx:   Encounter Diagnosis     ICD-10-CM    1  Chronic low back pain, unspecified back pain laterality, unspecified whether sciatica present  M54 50     G89 29    2  Right lumbar radiculopathy  M54 16                   Subjective: Pt reports that she did well after last session  Objective: See treatment diary below      Assessment: Tolerated treatment well  Patient demonstrated fatigue post treatment, exhibited good technique with therapeutic exercises and would benefit from continued PT  Pt had difficulty with maintain abdominal contraction with movement of the LEs, continue to progress core strengthening as able  Plan: Continue per plan of care  Progress treatment as tolerated         Precautions: Hx of CA       Manuals 22                                   Neuro Re-Ed                                                 Pt Ed/ HEP Pathology and involved anatomy along with issuing and reviewing of HEP 15'               Ther Ex        Nu Step  L4 10' L4 10' L4 10' L4 10'   LTR  10" x12 fadumo 10" x12 10"x12 fadumo  10"x12 fadumo   PPT  10" 2x10 10" 2x10 10"x20 10"x20   SLTR-glute stretch  10" x12 fadumo 10" x12 fadumo 10"x12 fadumo  10"x12 fadumo    DKTC  10" 12x 10" x12 10"x12 10"x12   Sciatic swing glides  4' 4' 4'  4'    Long sitting hamstring stretch on plinth  4' 4' 4' 4'           Ther Activity        PPT w/ marches 3x10  3x10 3x10 fadumo  3x10 fadumo    Isometric powerball crunches   5" 15x 5" x15 5" x15                                                   Gait Training                        Modalities        MHP  Seated 12' 12' 12' 10'

## 2022-07-21 ENCOUNTER — OFFICE VISIT (OUTPATIENT)
Dept: PHYSICAL THERAPY | Facility: CLINIC | Age: 82
End: 2022-07-21
Payer: MEDICARE

## 2022-07-21 DIAGNOSIS — M54.50 CHRONIC LOW BACK PAIN, UNSPECIFIED BACK PAIN LATERALITY, UNSPECIFIED WHETHER SCIATICA PRESENT: Primary | ICD-10-CM

## 2022-07-21 DIAGNOSIS — G89.29 CHRONIC LOW BACK PAIN, UNSPECIFIED BACK PAIN LATERALITY, UNSPECIFIED WHETHER SCIATICA PRESENT: Primary | ICD-10-CM

## 2022-07-21 DIAGNOSIS — M54.16 RIGHT LUMBAR RADICULOPATHY: ICD-10-CM

## 2022-07-21 PROCEDURE — 97110 THERAPEUTIC EXERCISES: CPT

## 2022-07-21 NOTE — PROGRESS NOTES
Daily Note     Today's date: 2022  Patient name: Estrellita Hercules  : 1940  MRN: [id number]  Referring provider: Ivan Mancera MD  Dx:   Encounter Diagnosis     ICD-10-CM    1  Chronic low back pain, unspecified back pain laterality, unspecified whether sciatica present  M54 50     G89 29    2  Right lumbar radiculopathy  M54 16                   Subjective: Bo Bustillos reports that she got new script for R shoulder  She denies having any radicular symptoms today and reports PT has really been helping low back  Objective: See treatment diary below      Assessment: Added paloff press this visit to work working on core stab/anti rotation; denied any increases in pain  Some difficulty with marches on R side today; educated to only perform to tolerance at this time but denied any increases in radicular symptoms  Pt would continue to benefit from skilled PT  Plan: Continue with current POC to address pt deficits        Precautions: Hx of CA       Manuals 22                                   Neuro Re-Ed                                                 Pt Ed/ HEP                Ther Ex        Nu Step L4 12' L4 10' L4 10' L4 10' L4 10'   LTR 10"x12 fadumo  10" x12 fadumo 10" x12 10"x12 fadumo  10"x12 fadumo   PPT 10"x20 10" 2x10 10" 2x10 10"x20 10"x20   SLTR-glute stretch 10"x12 fadumo  10" x12 fadumo 10" x12 fadumo 10"x12 fadumo  10"x12 fadumo    DKTC 10"x12 10" 12x 10" x12 10"x12 10"x12   Sciatic swing glides 4' 4' 4' 4'  4'    Long sitting hamstring stretch on plinth 4' fadumo  4' 4' 4' 4'           Ther Activity        PPT w/ marches 3x10 fadumo   3x10 3x10 fadumo  3x10 fadumo    Isometric powerball crunches 5" x15  5" 15x 5" x15 5" x15   paloff press  Red 2x10 fadumo                                                Gait Training                        Modalities        MHP Seated 8' Seated 12' 12' 12' 10'

## 2022-07-25 ENCOUNTER — EVALUATION (OUTPATIENT)
Dept: PHYSICAL THERAPY | Facility: CLINIC | Age: 82
End: 2022-07-25
Payer: MEDICARE

## 2022-07-25 DIAGNOSIS — M54.16 RIGHT LUMBAR RADICULOPATHY: Primary | ICD-10-CM

## 2022-07-25 DIAGNOSIS — M54.50 CHRONIC LOW BACK PAIN, UNSPECIFIED BACK PAIN LATERALITY, UNSPECIFIED WHETHER SCIATICA PRESENT: ICD-10-CM

## 2022-07-25 DIAGNOSIS — G89.29 CHRONIC LOW BACK PAIN, UNSPECIFIED BACK PAIN LATERALITY, UNSPECIFIED WHETHER SCIATICA PRESENT: ICD-10-CM

## 2022-07-25 DIAGNOSIS — M25.511 RIGHT SHOULDER PAIN, UNSPECIFIED CHRONICITY: ICD-10-CM

## 2022-07-25 PROCEDURE — 97112 NEUROMUSCULAR REEDUCATION: CPT

## 2022-07-25 PROCEDURE — 97164 PT RE-EVAL EST PLAN CARE: CPT

## 2022-07-25 PROCEDURE — 97110 THERAPEUTIC EXERCISES: CPT

## 2022-07-25 NOTE — PROGRESS NOTES
PT Re-Evaluation    Today's date: 2022  Patient name: Marlo Duke  : 1940  MRN: [id number]  Referring provider: Hank Gudino MD  Dx:   Encounter Diagnosis     ICD-10-CM    1  Right lumbar radiculopathy  M54 16    2  Chronic low back pain, unspecified back pain laterality, unspecified whether sciatica present  M54 50     G89 29    3  Right shoulder pain, unspecified chronicity  M25 511                   Assessment  Assessment details: Pt presents with signs and symptoms consistent with referring diagnosis, sciatica and right SI joint involvement  22:  -Pt's lumbar symptoms are improving well  Her right shoulder signs and symptoms are consistent with impingement and supraspinatus involvement  Impairments: abnormal or restricted ROM, impaired physical strength, lacks appropriate home exercise program, pain with function and poor posture     Symptom irritability: moderateUnderstanding of Dx/Px/POC: good   Prognosis: good    Goals  ST) Pt  Will have centralized radicular symptoms in 6 weeks  -GOAL MET  2) Pt  Will be able to sit as long as desired without pain in 6 weeks  -EXCELLENT PROGRESS  3)  Pt  Will be able to perform all routine chores at home without pain or limitations in 6 weeks  -SOME PROGRESS 4) Pt  Will be able to perform all LE dressing tasks without limitations  -GOOD PROGRESS  LT) Pt  Will be timothy to stand as long as desired without pain in 12 weeks  -GOOD PROGRESS  2)  Pt  Will be able to perform all heavy activity at home, such as garbage, moving furniture, carrying weighted items on all surfaces without pain or limitation in 12 weeks    -SOME PROGRESS    Plan  Patient would benefit from: skilled physical therapy and PT eval  Planned modality interventions: thermotherapy: hydrocollator packs, cryotherapy and unattended electrical stimulation  Planned therapy interventions: manual therapy, neuromuscular re-education, patient education, self care, therapeutic activities, therapeutic exercise and home exercise program  Frequency: 3x week  Duration in weeks: 12  Treatment plan discussed with: patient        Subjective Evaluation    History of Present Illness  Mechanism of injury: Symptom onset approx 3 weeks ago  Woke with increased pain in low back region  Symptoms right sided  Intermittent symptoms to RLE  Intermittent symptoms noted  No left sided symptoms  No hx LBP  Denies altered sensation BLE       7-25-22:  2 month history of right shoulder pain without trauma  Pt does have recent history of right breast CA  Radiation was completed in December 2021  Not a recurrent problem   Pain  Current pain rating: 3  At best pain rating: 3  At worst pain rating: 10  Location: LBP and lateral right shoulder referring into right lateral arm and elbow  Quality: sharp  Relieving factors: rest  Aggravating factors: sitting  Progression: worsening    Life stress: enjoys gardening, reading,     Patient Goals  Patient goals for therapy: increased strength, decreased pain and increased motion  Patient goal: decrease right shoulder pain and improve function        Objective     Postural Observations  Seated posture: fair    Additional Postural Observation Details  Elevated right pelvis and right shifted    7-25-22:  -Bilateral moderate rounded shoulders and forward head posture    Palpation     Right   Tenderness of the gluteus marilee, gluteus medius, pectoralis minor, piriformis, quadratus lumborum and supraspinatus  Trigger point to gluteus marilee, gluteus medius, levator scapulae, middle trapezius, piriformis and quadratus lumborum  Tenderness     Left Shoulder   Tenderness in the bicipital groove  Right Shoulder  Tenderness in the bicipital groove  Right Hip   Tenderness in the PSIS       Cervical/Thoracic Screen   Cervical range of motion within normal limits    Neurological Testing     Additional Neurological Details  Unremarkable  Unremarkable    Active Range of Motion     Right Shoulder   Flexion: 110 degrees with pain  Abduction: 100 degrees with pain  External rotation 90°: 60 degrees  Internal rotation 90°: 70 degrees     Lumbar   Flexion:  Restriction level: minimal  Extension:  Restriction level: moderate  Left rotation:  Restriction level: moderate  Right rotation:  Restriction level: moderate    Joint Play     Hypomobile: L2, L3, L4 and L5     Muscle Activation   Patient able to activate left transverse abdominals, left internal obliques, left multifidus, right transverse abdominals, right internal obliques and right multifidus  Tests     Right Shoulder   Positive empty can, Neer's and painful arc  Negative belly press, drop arm, lift-off, Speed's and bicep load test positive  Lumbar   Positive sacroiliac distraction   Right   Positive slump test    Negative passive SLR and quadrant  Right Hip   Positive long sit  Additional Tests Details  Resisted isometrics with IR/ ER are strong with slight pain            Precautions: Hx of CA       Manuals 7/21/22 7-25-22 7-12-22 7/14/22 7/18                                   Neuro Re-Ed                                                 Pt Ed/ HEP                Ther Ex        Nu Step L4 12' L4 12' L4 10' L4 10' L4 10'   LTR 10"x12 fadumo  10" x12 fadumo 10" x12 10"x12 fadumo  10"x12 fadumo   PPT 10"x20 10" 2x10 10" 2x10 10"x20 10"x20   SLTR-glute stretch 10"x12 fadumo  10" x12 fadumo 10" x12 fadumo 10"x12 fadumo  10"x12 fadumo    DKTC 10"x12 10" 12x 10" x12 10"x12 10"x12   Sciatic swing glides 4' 4' 4' 4'  4'    Long sitting hamstring stretch on plinth 4' fadumo  4' 4' 4' 4'           Ther Activity        PPT w/ marches 3x10 fadumo  3x10 fadumo 3x10 3x10 fadumo  3x10 fadumo    Isometric powerball crunches 5" x15 Hold today 5" 15x 5" x15 5" x15   paloff press  Red 2x10 fadumo  Hold today                                              Gait Training                        Modalities        MHP Seated 8' Seated 12' 12' 12' 10'

## 2022-08-01 ENCOUNTER — OFFICE VISIT (OUTPATIENT)
Dept: PHYSICAL THERAPY | Facility: CLINIC | Age: 82
End: 2022-08-01
Payer: MEDICARE

## 2022-08-01 DIAGNOSIS — G89.29 CHRONIC LOW BACK PAIN, UNSPECIFIED BACK PAIN LATERALITY, UNSPECIFIED WHETHER SCIATICA PRESENT: ICD-10-CM

## 2022-08-01 DIAGNOSIS — M25.511 RIGHT SHOULDER PAIN, UNSPECIFIED CHRONICITY: ICD-10-CM

## 2022-08-01 DIAGNOSIS — M54.16 RIGHT LUMBAR RADICULOPATHY: Primary | ICD-10-CM

## 2022-08-01 DIAGNOSIS — M54.50 CHRONIC LOW BACK PAIN, UNSPECIFIED BACK PAIN LATERALITY, UNSPECIFIED WHETHER SCIATICA PRESENT: ICD-10-CM

## 2022-08-01 PROCEDURE — 97110 THERAPEUTIC EXERCISES: CPT

## 2022-08-01 PROCEDURE — 97010 HOT OR COLD PACKS THERAPY: CPT

## 2022-08-01 PROCEDURE — 97530 THERAPEUTIC ACTIVITIES: CPT

## 2022-08-01 NOTE — PROGRESS NOTES
Daily Note     Today's date: 2022  Patient name: Saul Beltran  : 1940  MRN: [id number]  Referring provider: Jerald Hendricks MD  Dx:   Encounter Diagnosis     ICD-10-CM    1  Right lumbar radiculopathy  M54 16    2  Right shoulder pain, unspecified chronicity  M25 511    3  Chronic low back pain, unspecified back pain laterality, unspecified whether sciatica present  M54 50     G89 29                   Subjective: Pt reports no complaints this morning, last night her shoulder was bothering her  Objective: See treatment diary below      Assessment: Tolerated treatment well  She demonstrates good recall of current exercise program  No complaints of increased pain  Patient would benefit from continued PT      Plan: Continue per plan of care        Precautions: Hx of CA       Manuals 22                                   Neuro Re-Ed                                                 Pt Ed/ HEP                Ther Ex        Nu Step L4 12' L4 12'  L4 10' L4 10'   LTR 10"x12 fadumo  10"x12 fadumo  10"x12 fadumo  10"x12 fadumo   PPT 10"x20 10"x20  10"x20 10"x20   SLTR-glute stretch 10"x12 fadumo  10"x12 fadumo  10"x12 fadumo  10"x12 fadumo    DKTC 10"x12 10"x12  10"x12 10"x12   Sciatic swing glides 4' 4"  4'  4'    Long sitting hamstring stretch on plinth 4' fadumo  4' fadumo  4' 4'           Ther Activity        PPT w/ marches 3x10 fadumo  3x10 fadumo  3x10 fadumo  3x10 fadumo    Isometric powerball crunches 5" x15 5"x15  5" x15 5" x15   paloff press  Red 2x10 fadumo  Red 2x10 fadumo                                              Gait Training                        Modalities        MHP Seated 8' 8' seated  12' 10'

## 2022-08-03 ENCOUNTER — OFFICE VISIT (OUTPATIENT)
Dept: CARDIOLOGY CLINIC | Facility: CLINIC | Age: 82
End: 2022-08-03
Payer: MEDICARE

## 2022-08-03 VITALS
DIASTOLIC BLOOD PRESSURE: 90 MMHG | BODY MASS INDEX: 27.83 KG/M2 | SYSTOLIC BLOOD PRESSURE: 120 MMHG | HEART RATE: 55 BPM | HEIGHT: 64 IN | WEIGHT: 163 LBS

## 2022-08-03 DIAGNOSIS — E78.5 DYSLIPIDEMIA: ICD-10-CM

## 2022-08-03 DIAGNOSIS — R01.1 CARDIAC MURMUR: ICD-10-CM

## 2022-08-03 DIAGNOSIS — R00.1 SINUS BRADYCARDIA: Primary | ICD-10-CM

## 2022-08-03 DIAGNOSIS — I10 PRIMARY HYPERTENSION: ICD-10-CM

## 2022-08-03 DIAGNOSIS — I44.0 1ST DEGREE AV BLOCK: ICD-10-CM

## 2022-08-03 PROCEDURE — 93000 ELECTROCARDIOGRAM COMPLETE: CPT | Performed by: INTERNAL MEDICINE

## 2022-08-03 PROCEDURE — 99203 OFFICE O/P NEW LOW 30 MIN: CPT | Performed by: INTERNAL MEDICINE

## 2022-08-03 NOTE — PROGRESS NOTES
[facility] [place] [facility] [address]  Phone#  [phone]  Fax#  [phone]                                               Cardiology Office Consult  Vianca 81 y o  female  YOB: 1940  MRN: [id number] Encounter: [id number]      PCP - Clarence Gilmore MD  Referring Provider - Emmanuel Aviles MD    Chief Complaint   Patient presents with    Slow Heart Rate       Assessment  Sinus bradycardia  1st degree AV block  Hypertension  Cardiac murmur  Dyslipidemia  Overweight, Body mass index is 27 98 kg/m²  Plan  Sinus bradycardia  Asymptomatic, but did have recent fatigue - which seems to have improved with lowering of BB dosing  She has been on metoprolol for 5+ years, reportedly for oneil maintenance and BP control  No prior history of palpitations and no known heart conditions necessary eating continuation of metoprolol  She prefers to stop metoprolol   With her BP being well controlled otherwise, I am okay with her stopping metoprolol  If blood pressure continues to be an issue then can uptitrate rampiril    Hypertension  Blood pressure well controlled, 120/90 today  Currently on HCTZ 12 5 mg daily, ramipril 10 mg daily, metoprolol succinate 25 mg daily  She is concerned about a sinus bradycardia and prefers to discontinue metoprolol and as a result we will stop this now  Monitor blood pressure and can up titrate ramipril if needed  She has lost about 20 lb over the last 2 years, this is likely helping control her blood pressure      Cardiac murmur  suggestive of probable mild AS  Will check echocardiogram    Dyslipidemia    All available lipids, dating back to 2014 have been well controlled  Tolerating statins  Continue atorvastatin 20 mg daily    Results for orders placed or performed in visit on 08/03/22   POCT ECG    Impression    Sinus bradycardia with first-degree AV block (HR 55 bpm)       Orders Placed This Encounter   Procedures    POCT ECG    Echo complete w/ contrast if indicated     Return in about 1 year (around 8/3/2023), or if symptoms worsen or fail to improve  History of Present Illness   81 y o  female comes in as a new patient for consultation regarding bradycardia  She reports no active complains of chest pain, shortness of breath, palpitations or dizziness  No recent or prior history of near-syncope or syncope  She does report that she had increased fatigue recently and as a result her metoprolol dosing was decreased  But with this change, her fatigue has improved significantly and she feels much better  Due to her own concerns about slow heart rate, she was referred to see Cardiology  She reports to me that she has been on multiple medications for several years, but states that it these were mainly started due to her bringing up a [place] times article with her physician at that time, apparently indicating that "everybody would benefit from statin and HCTZ"  And she has since remained on these  No prior known history of coronary artery disease or heart failure  No prior major cardiac testing  Historical Information   Past Medical History:   Diagnosis Date    Breast cancer ([facility] [address] )     Hyperlipidemia, unspecified     Hypertension      Past Surgical History:   Procedure Laterality Date    BREAST LUMPECTOMY      BREAST SURGERY      CATARACT EXTRACTION      EYE SURGERY      HYSTERECTOMY       History reviewed  No pertinent family history  Current Outpatient Medications on File Prior to Visit   Medication Sig Dispense Refill    anastrozole (ARIMIDEX) 1 mg tablet Take 1 mg by mouth daily      aspirin (ECOTRIN LOW STRENGTH) 81 mg EC tablet Take 81 mg by mouth      atorvastatin (LIPITOR) 20 mg tablet Take 1 tablet (20 mg total) by mouth in the morning   90 tablet 3    Calcium-Phosphorus-Vitamin D (Citracal +D3) 250-107-500 MG-MG-UNIT CHEW Chew      hydrochlorothiazide (HYDRODIURIL) 12 5 mg tablet Take 1 tablet (12 5 mg total) by mouth in the morning  90 tablet 3    Multiple Vitamin (multivitamin) capsule Take 1 capsule by mouth daily      ramipril (ALTACE) 10 MG capsule Take 1 capsule (10 mg total) by mouth in the morning  90 capsule 3    travoprost (TRAVATAN-Z) 0 004 % ophthalmic solution instill 1 drop into both eyes BEFORE BEDTIME      [DISCONTINUED] metoprolol succinate (TOPROL-XL) 25 mg 24 hr tablet Take 1 tablet (25 mg total) by mouth in the morning  90 tablet 3    aspirin (ECOTRIN LOW STRENGTH) 81 mg EC tablet Take 81 mg by mouth daily       No current facility-administered medications on file prior to visit  Allergies   Allergen Reactions    Brimonidine Other (See Comments)     Eye lashes fell off     Social History     Socioeconomic History    Marital status: /Civil Union     Spouse name: None    Number of children: None    Years of education: None    Highest education level: None   Occupational History    None   Tobacco Use    Smoking status: Never Smoker    Smokeless tobacco: Never Used   Substance and Sexual Activity    Alcohol use: None    Drug use: None    Sexual activity: None   Other Topics Concern    None   Social History Narrative    None     Social Determinants of Health     Financial Resource Strain: Not on file   Food Insecurity: Not on file   Transportation Needs: Not on file   Physical Activity: Not on file   Stress: Not on file   Social Connections: Not on file   Intimate Partner Violence: Not on file   Housing Stability: Not on file        Review of Systems   All other systems reviewed and are negative  Vitals:  Vitals:    08/03/22 1334   BP: 120/90   Pulse: 55   Weight: 73 9 kg (163 lb)   Height: 5' 4" (1 626 m)     BMI - Body mass index is 27 98 kg/m²    Wt Readings from Last 7 Encounters:   08/03/22 73 9 kg (163 lb)   05/11/22 75 4 kg (166 lb 3 2 oz)   11/09/21 74 7 kg (164 lb 9 6 oz)       Physical Exam  Vitals and nursing note reviewed  Constitutional:       General: She is not in acute distress  Appearance: Normal appearance  She is well-developed  She is not ill-appearing  HENT:      Head: Normocephalic and atraumatic  Nose: No congestion  Eyes:      General: No scleral icterus  Conjunctiva/sclera: Conjunctivae normal    Neck:      Vascular: No carotid bruit or JVD  Cardiovascular:      Rate and Rhythm: Normal rate and regular rhythm  Pulses: Normal pulses  Heart sounds: Murmur heard  Crescendo decrescendo systolic murmur is present with a grade of 3/6  No friction rub  No gallop  Pulmonary:      Effort: Pulmonary effort is normal  No respiratory distress  Breath sounds: Normal breath sounds  No rales  Abdominal:      General: There is no distension  Palpations: Abdomen is soft  Tenderness: There is no abdominal tenderness  Musculoskeletal:         General: No swelling or tenderness  Cervical back: Neck supple  Right lower leg: No edema  Left lower leg: No edema  Skin:     General: Skin is warm  Neurological:      General: No focal deficit present  Mental Status: She is alert and oriented to person, place, and time  Mental status is at baseline  Psychiatric:         Mood and Affect: Mood normal          Behavior: Behavior normal          Thought Content:  Thought content normal          Labs:  CBC:   Lab Results   Component Value Date    WBC 5 1 05/05/2022    RBC 4 09 05/05/2022    HGB 12 0 05/05/2022    HCT 34 9 05/05/2022    MCV 85 05/05/2022     (L) 05/05/2022    RDW 12 9 05/05/2022       CMP:   Lab Results   Component Value Date    K 4 6 05/05/2022     05/05/2022    CO2 25 05/05/2022    BUN 16 05/05/2022    CREATININE 0 78 05/05/2022    EGFR 76 05/05/2022    AST 35 05/05/2022    ALT 34 (H) 05/05/2022       Magnesium:  No results found for: MG    Lipid Profile:   Lab Results   Component Value Date    HDL 42 05/05/2022    TRIG 103 05/05/2022    [address] 05/05/2022       Thyroid Studies: No results found for: UGL2ZFJLBZOW, T3FREE, FREET4, J8UZHUW, G3FFWDH    A1c:  No components found for: HGA1C    INR:  No results found for: CKU6    Imaging: No results found  Cardiac testing:   No results found for this or any previous visit  No results found for this or any previous visit  No results found for this or any previous visit  No results found for this or any previous visit  [facility]amm[facility]  This Care Everywhere (CE) document can be found within the Care Everywhere (CE) Activity  Linking this document to the order is not available at this time

## 2022-08-04 NOTE — PROGRESS NOTES
Daily Note     Today's date: 2022  Patient name: Ariel Enciso  : 1940  MRN: [id number]  Referring provider: Georgi Mullen MD  Dx:   Encounter Diagnosis     ICD-10-CM    1  Right lumbar radiculopathy  M54 16    2  Right shoulder pain, unspecified chronicity  M25 511    3  Chronic low back pain, unspecified back pain laterality, unspecified whether sciatica present  M54 50     G89 29                   Subjective: Odin Ramirez reports her low back pain is doing better; denies any radicular symptoms  She would like to focus on her R shoulder today  She reports most difficulty reaching OH  Objective: See treatment diary below      Assessment: Progressed TE for R shoulder with visual and VC to ensure correct exercise technique and ensure posterior tilt of R scap  Improvement in shoulder motion with AAROM exercises and PROM  Educated on benefits of exercises performed  Resume LB exercises next visit  Progress as able  Plan: Continue with current POC to address pt deficits        Precautions: Hx of CA       Manuals 22   STM/PROM R shoulder    10'                             Neuro Re-Ed         MTP/LTP   Red 2x10 ea      Postural ed    5' scap positioning/post tilt                             Pt Ed/ HEP                Ther Ex        Nu Step L4 12' L4 12' L6 12' L4 10' L4 10'   LTR 10"x12 fadumo  10"x12 fadumo  10"x12 fadumo  10"x12 fadumo   PPT 10"x20 10"x20  10"x20 10"x20   SLTR-glute stretch 10"x12 fadumo  10"x12 fadumo  10"x12 fadumo  10"x12 fadumo    DKTC 10"x12 10"x12  10"x12 10"x12   Sciatic swing glides 4' 4"  4'  4'    AAROM flex/ER w/cane    2x10 5"      Pulleys    Flex 2x10     Long sitting hamstring stretch on plinth 4' fadumo  4' fadumo  4' 4'           Ther Activity        PPT w/ marches 3x10 fadumo  3x10 fadumo  3x10 fadumo  3x10 fadumo    Isometric powerball crunches 5" x15 5"x15  5" x15 5" x15   paloff press  Red 2x10 fadumo  Red 2x10 fadumo                                              Gait Training Modalities        MHP Seated 8' 8' seated 10' MHP LB, R shoulder  12' 10'

## 2022-08-05 ENCOUNTER — OFFICE VISIT (OUTPATIENT)
Dept: PHYSICAL THERAPY | Facility: CLINIC | Age: 82
End: 2022-08-05
Payer: MEDICARE

## 2022-08-05 DIAGNOSIS — M25.511 RIGHT SHOULDER PAIN, UNSPECIFIED CHRONICITY: ICD-10-CM

## 2022-08-05 DIAGNOSIS — G89.29 CHRONIC LOW BACK PAIN, UNSPECIFIED BACK PAIN LATERALITY, UNSPECIFIED WHETHER SCIATICA PRESENT: ICD-10-CM

## 2022-08-05 DIAGNOSIS — M54.16 RIGHT LUMBAR RADICULOPATHY: Primary | ICD-10-CM

## 2022-08-05 DIAGNOSIS — M54.50 CHRONIC LOW BACK PAIN, UNSPECIFIED BACK PAIN LATERALITY, UNSPECIFIED WHETHER SCIATICA PRESENT: ICD-10-CM

## 2022-08-05 PROCEDURE — 97110 THERAPEUTIC EXERCISES: CPT

## 2022-08-05 PROCEDURE — 97112 NEUROMUSCULAR REEDUCATION: CPT

## 2022-08-05 PROCEDURE — 97140 MANUAL THERAPY 1/> REGIONS: CPT

## 2022-08-05 NOTE — PROGRESS NOTES
Daily Note     Today's date: 2022  Patient name: Carmen Bustamante  : 1940  MRN: [id number]  Referring provider: Albert Sadler MD  Dx:   Encounter Diagnosis     ICD-10-CM    1  Right lumbar radiculopathy  M54 16    2  Right shoulder pain, unspecified chronicity  M25 511    3  Chronic low back pain, unspecified back pain laterality, unspecified whether sciatica present  M54 50     G89 29                   Subjective:  I'm doing ok      Objective: See treatment diary below      Assessment: Tolerated treatment well  Reports low back has been feeling good  Continued symptoms right shoulder with increased pain noted with ER of shoulder  Discussed return to gym and advised perform TE that are pain free  Patient would benefit from continued PT      Plan: Continue per plan of care           Precautions: Hx of CA       Manuals 22   STM/PROM R shoulder    10' 10'                            Neuro Re-Ed         MTP/LTP   Red 2x10 ea  Red 20x ea    Postural ed    5' scap positioning/post tilt                             Pt Ed/ HEP                Ther Ex        Nu Step L4 12' L4 12' L6 12' L6 12' L4 10'   LTR 10"x12 fadumo  10"x12 fadumo   10"x12 fadumo   PPT 10"x20 10"x20   10"x20   SLTR-glute stretch 10"x12 fadumo  10"x12 fadumo   10"x12 fadumo    DKTC 10"x12 10"x12   10"x12   Sciatic swing glides 4' 4"   4'    T-band ER/IR    Red 20x     AAROM flex/ER w/cane    2x10 5"  20x  5"  ea    Pulleys    Flex 2x10 Flex 20x     Long sitting hamstring stretch on plinth 4' fadumo  4' fadumo   4'           Ther Activity        PPT w/ marches 3x10 fadumo  3x10 fadumo   3x10 fadumo    Isometric powerball crunches 5" x15 5"x15   5" x15   paloff press  Red 2x10 fadumo  Red 2x10 fadumo                                              Gait Training                        Modalities        MHP Seated 8' 8' seated 10' MHP LB, R shoulder  10'  MHP LB and Right shoulder 10'

## 2022-08-08 ENCOUNTER — OFFICE VISIT (OUTPATIENT)
Dept: PHYSICAL THERAPY | Facility: CLINIC | Age: 82
End: 2022-08-08
Payer: MEDICARE

## 2022-08-08 DIAGNOSIS — G89.29 CHRONIC LOW BACK PAIN, UNSPECIFIED BACK PAIN LATERALITY, UNSPECIFIED WHETHER SCIATICA PRESENT: ICD-10-CM

## 2022-08-08 DIAGNOSIS — M25.511 RIGHT SHOULDER PAIN, UNSPECIFIED CHRONICITY: ICD-10-CM

## 2022-08-08 DIAGNOSIS — M54.50 CHRONIC LOW BACK PAIN, UNSPECIFIED BACK PAIN LATERALITY, UNSPECIFIED WHETHER SCIATICA PRESENT: ICD-10-CM

## 2022-08-08 DIAGNOSIS — M54.16 RIGHT LUMBAR RADICULOPATHY: Primary | ICD-10-CM

## 2022-08-08 PROCEDURE — 97140 MANUAL THERAPY 1/> REGIONS: CPT

## 2022-08-08 PROCEDURE — 97110 THERAPEUTIC EXERCISES: CPT

## 2022-08-10 ENCOUNTER — OFFICE VISIT (OUTPATIENT)
Dept: PHYSICAL THERAPY | Facility: CLINIC | Age: 82
End: 2022-08-10
Payer: MEDICARE

## 2022-08-10 DIAGNOSIS — M54.50 CHRONIC LOW BACK PAIN, UNSPECIFIED BACK PAIN LATERALITY, UNSPECIFIED WHETHER SCIATICA PRESENT: ICD-10-CM

## 2022-08-10 DIAGNOSIS — G89.29 CHRONIC LOW BACK PAIN, UNSPECIFIED BACK PAIN LATERALITY, UNSPECIFIED WHETHER SCIATICA PRESENT: ICD-10-CM

## 2022-08-10 DIAGNOSIS — M54.16 RIGHT LUMBAR RADICULOPATHY: ICD-10-CM

## 2022-08-10 DIAGNOSIS — M25.511 RIGHT SHOULDER PAIN, UNSPECIFIED CHRONICITY: Primary | ICD-10-CM

## 2022-08-10 PROCEDURE — 97140 MANUAL THERAPY 1/> REGIONS: CPT

## 2022-08-10 PROCEDURE — 97110 THERAPEUTIC EXERCISES: CPT

## 2022-08-10 NOTE — PROGRESS NOTES
Daily Note     Today's date: 8/10/2022  Patient name: Oral Casiano  : 1940  MRN: [id number]  Referring provider: Sam Borden MD  Dx:   Encounter Diagnosis     ICD-10-CM    1  Right shoulder pain, unspecified chronicity  M25 511    2  Right lumbar radiculopathy  M54 16    3  Chronic low back pain, unspecified back pain laterality, unspecified whether sciatica present  M54 50     G89 29                   Subjective: The shoulder is ok  It hurts when I move it in certain directions      Objective: See treatment diary below      Assessment: Tolerated treatment well  Notes pain with moving into ER and elevating UE  Unable to sleep on right side due to symptoms  Patient would benefit from continued PT      Plan: Continue per plan of care        Precautions: Hx of CA       Manuals 7/21/22 8/1/22 8/5/22 8/8/22 8/10/22   STM/PROM R shoulder    10' 10' 10'                           Neuro Re-Ed         MTP/LTP   Red 2x10 ea  Red 20x ea 30x  ea   Postural ed    5' scap positioning/post tilt     Shrug/post roll     2#  20x                   Pt Ed/ HEP                Ther Ex        Nu Step L4 12' L4 12' L6 12' L6 12' L6 12'   LTR 10"x12 fadumo  10"x12 fadumo      PPT 10"x20 10"x20      SLTR-glute stretch 10"x12 fadumo  10"x12 fadumo      DKTC 10"x12 10"x12      Bicep curl     2#  20x   T-band ER/IR    Red 20x  Red 30x   AAROM flex/ER w/cane    2x10 5"  20x  5"  ea 20x  5"   Pulleys    Flex 2x10 Flex 20x  5'    Long sitting hamstring stretch on plinth 4' fadumo  4' fadumo              Ther Activity        PPT w/ marches 3x10 fadumo  3x10 fadumo      Isometric powerball crunches 5" x15 5"x15      paloff press  Red 2x10 fadumo  Red 2x10 fadumo                                              Gait Training                        Modalities        MHP Seated 8' 8' seated 10' MHP LB, R shoulder  10'  MHP LB and Right shoulder 10'  MHP Low back and right shoulder

## 2022-08-15 ENCOUNTER — OFFICE VISIT (OUTPATIENT)
Dept: PHYSICAL THERAPY | Facility: CLINIC | Age: 82
End: 2022-08-15
Payer: MEDICARE

## 2022-08-15 DIAGNOSIS — M25.511 RIGHT SHOULDER PAIN, UNSPECIFIED CHRONICITY: ICD-10-CM

## 2022-08-15 DIAGNOSIS — G89.29 CHRONIC LOW BACK PAIN, UNSPECIFIED BACK PAIN LATERALITY, UNSPECIFIED WHETHER SCIATICA PRESENT: ICD-10-CM

## 2022-08-15 DIAGNOSIS — M54.16 RIGHT LUMBAR RADICULOPATHY: Primary | ICD-10-CM

## 2022-08-15 DIAGNOSIS — M54.50 CHRONIC LOW BACK PAIN, UNSPECIFIED BACK PAIN LATERALITY, UNSPECIFIED WHETHER SCIATICA PRESENT: ICD-10-CM

## 2022-08-15 PROCEDURE — 97110 THERAPEUTIC EXERCISES: CPT

## 2022-08-15 PROCEDURE — 97140 MANUAL THERAPY 1/> REGIONS: CPT

## 2022-08-15 NOTE — PROGRESS NOTES
Daily Note     Today's date: 8/15/2022  Patient name: Disha Shaikh  : 1940  MRN: [id number]  Referring provider: Eric Frias MD  Dx:   Encounter Diagnosis     ICD-10-CM    1  Right lumbar radiculopathy  M54 16    2  Right shoulder pain, unspecified chronicity  M25 511    3  Chronic low back pain, unspecified back pain laterality, unspecified whether sciatica present  M54 50     G89 29                   Subjective: no new complaints      Objective: See treatment diary below      Assessment: Tolerated treatment well  Patient would benefit from continued PT      Plan: Progress treatment as tolerated         Precautions: Hx of CA       Manuals 8-15-22 8/1/22 8/5/22 8/8/22 8/10/22   STM/PROM R shoulder    10' 10' 10'                           Neuro Re-Ed         MTP/LTP   Red 2x10 ea  Red 20x ea 30x  ea   Postural ed    5' scap positioning/post tilt     Shrug/post roll     2#  20x                   Pt Ed/ HEP                Ther Ex        Nu Step L6 12' L4 12' L6 12' L6 12' L6 12'   LTR 10"x12 fadumo  10"x12 fadumo      PPT 10"x20 10"x20      SLTR-glute stretch 10"x12 fadumo  10"x12 fadumo      DKTC 10"x12 10"x12      Bicep curl     2#  20x   T-band ER/IR Red 30x   Red 20x  Red 30x   AAROM flex/ER w/cane  20x 5"  2x10 5"  20x  5"  ea 20x  5"   Pulleys  5'  Flex 2x10 Flex 20x  5'    Long sitting hamstring stretch on plinth 4' fadumo  4' fadumo              Ther Activity        PPT w/ marches 3x10 fadumo  3x10 fadumo      Isometric powerball crunches 5" x15 5"x15      paloff press  Red 2x10 fadumo  Red 2x10 fadumo                                              Gait Training                        Modalities        MHP Seated 8' 8' seated 10' MHP LB, R shoulder  10'  MHP LB and Right shoulder 10'  MHP Low back and right shoulder

## 2022-08-17 ENCOUNTER — OFFICE VISIT (OUTPATIENT)
Dept: PHYSICAL THERAPY | Facility: CLINIC | Age: 82
End: 2022-08-17
Payer: MEDICARE

## 2022-08-17 DIAGNOSIS — M54.50 CHRONIC LOW BACK PAIN, UNSPECIFIED BACK PAIN LATERALITY, UNSPECIFIED WHETHER SCIATICA PRESENT: ICD-10-CM

## 2022-08-17 DIAGNOSIS — G89.29 CHRONIC LOW BACK PAIN, UNSPECIFIED BACK PAIN LATERALITY, UNSPECIFIED WHETHER SCIATICA PRESENT: ICD-10-CM

## 2022-08-17 DIAGNOSIS — M54.16 RIGHT LUMBAR RADICULOPATHY: Primary | ICD-10-CM

## 2022-08-17 DIAGNOSIS — M25.511 RIGHT SHOULDER PAIN, UNSPECIFIED CHRONICITY: ICD-10-CM

## 2022-08-17 PROCEDURE — 97140 MANUAL THERAPY 1/> REGIONS: CPT

## 2022-08-17 PROCEDURE — 97110 THERAPEUTIC EXERCISES: CPT

## 2022-08-17 NOTE — PROGRESS NOTES
Daily Note     Today's date: 2022  Patient name: Estrellita Hercules  : 1940  MRN: [id number]  Referring provider: Ivan Mancera MD  Dx:   Encounter Diagnosis     ICD-10-CM    1  Right lumbar radiculopathy  M54 16    2  Right shoulder pain, unspecified chronicity  M25 511    3  Chronic low back pain, unspecified back pain laterality, unspecified whether sciatica present  M54 50     G89 29                   Subjective: Patient had dermatologist remove lesion on L LE and was instructed to take it easy on that leg for two weeks therefore held nustep per patient  Objective: See treatment diary below      Assessment: Tolerated therapy session well having no increases in pain in R shoulder or low back  Visual and VC to ensure correct exercise technique  Improvement in overall R shoulder ROM following PROM  Increased tband resistance with MTP/LTP as patient was no longer challenged  Pt would continue to benefit from skilled PT  Plan: Continue with current POC to address pt deficits        Precautions: Hx of CA       Manuals 8-15-22 8/17/22 8/5/22 8/8/22 8/10/22   STM/PROM R shoulder   10' 10' 10' 10'                           Neuro Re-Ed         MTP/LTP   Red 2x10 ea  Red 20x ea 30x  ea   Postural ed    5' scap positioning/post tilt     Shrug/post roll     2#  20x                   Pt Ed/ HEP                Ther Ex        Nu Step L6 12' Pt deferred  L6 12' L6 12' L6 12'   LTR 10"x12 fadumo  10"x12 fadumo       PPT 10"x20 5"x20      SLTR-glute stretch 10"x12 fadumo        DKTC 10"x12 10"x12      Bicep curl     2#  20x   T-band ER/IR Red 30x Red x30 ea  Red 20x  Red 30x   AAROM flex/ER w/cane  20x 5" 20x 5" ea  2x10 5"  20x  5"  ea 20x  5"   Pulleys  5' 5'  Flex 2x10 Flex 20x  5'    Long sitting hamstring stretch on plinth 4' fadumo                Ther Activity        PPT w/ marches 3x10 fadumo  3x10 fadumo       Isometric powerball crunches 5" x15       paloff press  Red 2x10 fadumo  Red 2x10 fadumo       MTP/LTP  grn 3x10 Gait Training                        Modalities        MHP Seated 8' Seated 10' LB and R shoulder  10' MHP LB, R shoulder  10'  MHP LB and Right shoulder 10'  MHP Low back and right shoulder

## 2022-08-22 ENCOUNTER — OFFICE VISIT (OUTPATIENT)
Dept: PHYSICAL THERAPY | Facility: CLINIC | Age: 82
End: 2022-08-22
Payer: MEDICARE

## 2022-08-22 DIAGNOSIS — M54.16 RIGHT LUMBAR RADICULOPATHY: ICD-10-CM

## 2022-08-22 DIAGNOSIS — G89.29 CHRONIC LOW BACK PAIN, UNSPECIFIED BACK PAIN LATERALITY, UNSPECIFIED WHETHER SCIATICA PRESENT: ICD-10-CM

## 2022-08-22 DIAGNOSIS — M54.50 CHRONIC LOW BACK PAIN, UNSPECIFIED BACK PAIN LATERALITY, UNSPECIFIED WHETHER SCIATICA PRESENT: ICD-10-CM

## 2022-08-22 DIAGNOSIS — M25.511 RIGHT SHOULDER PAIN, UNSPECIFIED CHRONICITY: Primary | ICD-10-CM

## 2022-08-22 PROCEDURE — 97110 THERAPEUTIC EXERCISES: CPT

## 2022-08-22 PROCEDURE — 97140 MANUAL THERAPY 1/> REGIONS: CPT

## 2022-08-22 NOTE — PROGRESS NOTES
Daily Note     Today's date: 2022  Patient name: Jerad Cat  : 1940  MRN: [id number]  Referring provider: Shukri Barriga MD  Dx:   Encounter Diagnosis     ICD-10-CM    1  Right shoulder pain, unspecified chronicity  M25 511    2  Chronic low back pain, unspecified back pain laterality, unspecified whether sciatica present  M54 50     G89 29    3  Right lumbar radiculopathy  M54 16                   Subjective: The shoulder is feeling better overall but I still can't reach up high or lift heavier things  Objective: See treatment diary below      Assessment: Tolerated treatment well  Pain noted with lowering of RUE from elevated position  Symptoms also with end range ER  Patient would benefit from continued PT      Plan: Continue per plan of care        Precautions: Hx of CA       Manuals 8-15-22 8/17/22 8/22/22 8/8/22 8/10/22   STM/PROM R shoulder   10' 10' 10' 10'                           Neuro Re-Ed         MTP/LTP   Red 3x10 ea  Red 20x ea 30x  ea   Postural ed         Shrug/post roll   2#  30x  2#  20x                   Pt Ed/ HEP                Ther Ex        Nu Step L6 12' Pt deferred  L6 12' L6 12' L6 12'   LTR 10"x12 fadumo  10"x12 fadumo       PPT 10"x20 5"x20      SLTR-glute stretch 10"x12 fadumo        DKTC 10"x12 10"x12      Bicep curl   2#  30x  2#  20x   T-band ER/IR Red 30x Red x30 ea Red 30x ea Red 20x  Red 30x   AAROM flex/ER w/cane  20x 5" 20x 5" ea  2x10 5"  20x  5"  ea 20x  5"   Pulleys  5' 5'  Flex 3x10 Flex 20x  5'    Long sitting hamstring stretch on plinth 4' fadumo                Ther Activity        PPT w/ marches 3x10 fadumo  3x10 fadumo       Isometric powerball crunches 5" x15       paloff press  Red 2x10 fadumo  Red 2x10 fadumo       MTP/LTP  grn 3x10                                      Gait Training                        Modalities        MHP Seated 8' Seated 10' LB and R shoulder  10' MHP LB, R shoulder  10'  MHP LB and Right shoulder 10'  MHP Low back and right shoulder

## 2022-08-24 ENCOUNTER — OFFICE VISIT (OUTPATIENT)
Dept: PHYSICAL THERAPY | Facility: CLINIC | Age: 82
End: 2022-08-24
Payer: MEDICARE

## 2022-08-24 DIAGNOSIS — M54.16 RIGHT LUMBAR RADICULOPATHY: ICD-10-CM

## 2022-08-24 DIAGNOSIS — G89.29 CHRONIC LOW BACK PAIN, UNSPECIFIED BACK PAIN LATERALITY, UNSPECIFIED WHETHER SCIATICA PRESENT: ICD-10-CM

## 2022-08-24 DIAGNOSIS — M54.50 CHRONIC LOW BACK PAIN, UNSPECIFIED BACK PAIN LATERALITY, UNSPECIFIED WHETHER SCIATICA PRESENT: ICD-10-CM

## 2022-08-24 DIAGNOSIS — M25.511 RIGHT SHOULDER PAIN, UNSPECIFIED CHRONICITY: Primary | ICD-10-CM

## 2022-08-24 PROCEDURE — 97110 THERAPEUTIC EXERCISES: CPT

## 2022-08-24 PROCEDURE — 97140 MANUAL THERAPY 1/> REGIONS: CPT

## 2022-08-24 NOTE — PROGRESS NOTES
Daily Note     Today's date: 2022  Patient name: Ricardo Madrigal  : 1940  MRN: [id number]  Referring provider: Clement Roche MD  Dx:   Encounter Diagnosis     ICD-10-CM    1  Right shoulder pain, unspecified chronicity  M25 511    2  Chronic low back pain, unspecified back pain laterality, unspecified whether sciatica present  M54 50     G89 29    3  Right lumbar radiculopathy  M54 16                   Subjective: Gauri Hernandez reports doing well in reference to LBP and reports her R shoulder is bothersome depending on what she does  Continues to have difficulty reaching out in front of body and reaching [facility]  Objective: See treatment diary below      Assessment: Visual and VC to ensure correct exercise technique and avoid compensation from UT  Added scap punches to work on upward rotation of R scap  Improvement in ROM in all planes noted during PROM  Pt would continue to benefit from skilled PT  Progress as able  Plan: Continue with current POC to address pt deficits        Precautions: Hx of CA       Manuals 8-15-22 8/17/22 8/22/22 8/24/22 8/10/22   STM/PROM R shoulder   10' 10' 10' 10'                           Neuro Re-Ed         MTP/LTP   Red 3x10 ea  Red 3x10 ea 30x  ea   Postural ed         Shrug/post roll   2#  30x 2# 30x 2#  20x                   Pt Ed/ HEP                Ther Ex        Nu Step L6 12' Pt deferred  L6 12' L3 12' L6 12'   LTR 10"x12 fadumo  10"x12 fadumo       PPT 10"x20 5"x20      SLTR-glute stretch 10"x12 fadumo        DKTC 10"x12 10"x12      Bicep curl   2#  30x 2# 30x 2#  20x   T-band ER/IR Red 30x Red x30 ea Red 30x ea Red x30 ea Red 30x   AAROM flex/ER w/cane  20x 5" 20x 5" ea  2x10 5" ea 2x10 5" ea  20x  5"   Pulleys  5' 5'  Flex 3x10 Flex 3x10 5'    Long sitting hamstring stretch on plinth 4' fadumo        Supine cane punches     x15    Ther Activity        PPT w/ marches 3x10 fadumo  3x10 fadumo       Isometric powerball crunches 5" x15       paloff press  Red 2x10 fadumo  Red 2x10 fadumo MTP/LTP  grn 3x10                                      Gait Training                        Modalities        MHP Seated 8' Seated 10' LB and R shoulder  10' MHP LB, R shoulder  10' MHP LB, R shoulder  10'  MHP Low back and right shoulder

## 2022-08-30 ENCOUNTER — HOSPITAL ENCOUNTER (OUTPATIENT)
Dept: NON INVASIVE DIAGNOSTICS | Facility: CLINIC | Age: 82
Discharge: HOME | End: 2022-08-30
Payer: MEDICARE

## 2022-08-30 ENCOUNTER — OFFICE VISIT (OUTPATIENT)
Dept: PHYSICAL THERAPY | Facility: CLINIC | Age: 82
End: 2022-08-30
Payer: MEDICARE

## 2022-08-30 VITALS
BODY MASS INDEX: 27.83 KG/M2 | HEIGHT: 64 IN | WEIGHT: 163 LBS | DIASTOLIC BLOOD PRESSURE: 90 MMHG | HEART RATE: 62 BPM | SYSTOLIC BLOOD PRESSURE: 120 MMHG

## 2022-08-30 DIAGNOSIS — R00.1 SINUS BRADYCARDIA: ICD-10-CM

## 2022-08-30 DIAGNOSIS — M54.50 CHRONIC LOW BACK PAIN, UNSPECIFIED BACK PAIN LATERALITY, UNSPECIFIED WHETHER SCIATICA PRESENT: ICD-10-CM

## 2022-08-30 DIAGNOSIS — M54.16 RIGHT LUMBAR RADICULOPATHY: ICD-10-CM

## 2022-08-30 DIAGNOSIS — R01.1 CARDIAC MURMUR: ICD-10-CM

## 2022-08-30 DIAGNOSIS — G89.29 CHRONIC LOW BACK PAIN, UNSPECIFIED BACK PAIN LATERALITY, UNSPECIFIED WHETHER SCIATICA PRESENT: ICD-10-CM

## 2022-08-30 DIAGNOSIS — M25.511 RIGHT SHOULDER PAIN, UNSPECIFIED CHRONICITY: Primary | ICD-10-CM

## 2022-08-30 LAB
AORTIC ROOT: 3.2 CM
AORTIC VALVE MEAN VELOCITY: 10.1 M/S
APICAL FOUR CHAMBER EJECTION FRACTION: 71 %
ASCENDING AORTA: 3.2 CM
AV AREA BY CONTINUOUS VTI: 2.2 CM2
AV AREA PEAK VELOCITY: 2.1 CM2
AV LVOT MEAN GRADIENT: 2 MMHG
AV LVOT PEAK GRADIENT: 4 MMHG
AV MEAN GRADIENT: 5 MMHG
AV PEAK GRADIENT: 10 MMHG
AV REGURGITATION PRESSURE HALF TIME: 657 MS
AV VALVE AREA: 2.16 CM2
AV VELOCITY RATIO: 0.66
DOP CALC AO PEAK VEL: 1.59 M/S
DOP CALC AO VTI: 41.19 CM
DOP CALC LVOT AREA: 3.14 CM2
DOP CALC LVOT DIAMETER: 2 CM
DOP CALC LVOT PEAK VEL VTI: 28.4 CM
DOP CALC LVOT PEAK VEL: 1.05 M/S
DOP CALC LVOT STROKE INDEX: 47.5 ML/M2
DOP CALC LVOT STROKE VOLUME: 89.18 CM3
E WAVE DECELERATION TIME: 283 MS
FRACTIONAL SHORTENING: 32 % (ref 28–44)
INTERVENTRICULAR SEPTUM IN DIASTOLE (PARASTERNAL SHORT AXIS VIEW): 1.2 CM
INTERVENTRICULAR SEPTUM: 1.2 CM (ref 0.6–1.1)
LAAS-AP2: 20.4 CM2
LAAS-AP4: 11.3 CM2
LEFT ATRIUM SIZE: 3.5 CM
LEFT INTERNAL DIMENSION IN SYSTOLE: 2.5 CM (ref 2.1–4)
LEFT VENTRICULAR INTERNAL DIMENSION IN DIASTOLE: 3.7 CM (ref 3.5–6)
LEFT VENTRICULAR POSTERIOR WALL IN END DIASTOLE: 1.2 CM
LEFT VENTRICULAR STROKE VOLUME: 36 ML
LVSV (TEICH): 36 ML
MV E'TISSUE VEL-SEP: 10 CM/S
MV PEAK A VEL: 0.84 M/S
MV PEAK E VEL: 112 CM/S
MV STENOSIS PRESSURE HALF TIME: 82 MS
MV VALVE AREA P 1/2 METHOD: 2.68 CM2
PA SYSTOLIC PRESSURE: 52 MMHG
RIGHT ATRIUM AREA SYSTOLE A4C: 9.1 CM2
RIGHT VENTRICLE ID DIMENSION: 3.3 CM
SL CV AV DECELERATION TIME RETROGRADE: 2266 MS
SL CV AV PEAK GRADIENT RETROGRADE: 80 MMHG
SL CV LEFT ATRIUM LENGTH A2C: 5.6 CM
SL CV LV EF: 60
SL CV PED ECHO LEFT VENTRICLE DIASTOLIC VOLUME (MOD BIPLANE) 2D: 59 ML
SL CV PED ECHO LEFT VENTRICLE SYSTOLIC VOLUME (MOD BIPLANE) 2D: 22 ML
TR MAX PG: 44 MMHG
TR PEAK VELOCITY: 3.3 M/S
TRICUSPID VALVE PEAK REGURGITATION VELOCITY: 3.33 M/S

## 2022-08-30 PROCEDURE — 97140 MANUAL THERAPY 1/> REGIONS: CPT

## 2022-08-30 PROCEDURE — 97112 NEUROMUSCULAR REEDUCATION: CPT

## 2022-08-30 PROCEDURE — 93306 TTE W/DOPPLER COMPLETE: CPT | Performed by: INTERNAL MEDICINE

## 2022-08-30 PROCEDURE — 97110 THERAPEUTIC EXERCISES: CPT

## 2022-08-30 PROCEDURE — 93306 TTE W/DOPPLER COMPLETE: CPT

## 2022-08-30 NOTE — PROGRESS NOTES
PT Re-Evaluation  Collection of subjective/objective data performed by Nicole Guerrero PTA; Assessment, POC, and Goal attainment performed by Isis Houston DPT      Today's date: 2022  Patient name: Alana Manrique  : 1940  MRN: [id number]  Referring provider: Leon Ellis MD  Dx:   Encounter Diagnosis     ICD-10-CM    1  Right shoulder pain, unspecified chronicity  M25 511    2  Chronic low back pain, unspecified back pain laterality, unspecified whether sciatica present  M54 50     G89 29    3  Right lumbar radiculopathy  M54 16        Start Time: 1100          Assessment  Assessment details: Pt presents with signs and symptoms consistent with referring diagnosis, sciatica and right SI joint involvement  22:  -Pt's lumbar symptoms are improving well  Her right shoulder signs and symptoms are consistent with impingement and supraspinatus involvement  Impairments: abnormal or restricted ROM, impaired physical strength, lacks appropriate home exercise program, pain with function and poor posture     22:  Pt has improved overall  Pt's back is not a limitation at this point  She has improvement in overhead reaching mobility with limitations remaining in function overhead  She has returned to being able to garden as well as having less overall pain  She would still benefit from concentration on her shoulder in order to return to pre-morbid ability levels  Symptom irritability: moderateUnderstanding of Dx/Px/POC: good   Prognosis: good    Goals  ST) Pt  Will have centralized radicular symptoms in 6 weeks  -GOAL MET  2) Pt  Will be able to sit as long as desired without pain in 6 weeks  -EXCELLENT PROGRESS  3)  Pt  Will be able to perform all routine chores at home without pain or limitations in 6 weeks  -SOME PROGRESS 4) Pt  Will be able to perform all LE dressing tasks without limitations  -GOOD PROGRESS  LT) Pt   Will be timothy to stand as long as desired without pain in 12 weeks  -GOOD PROGRESS  2)  Pt  Will be able to perform all heavy activity at home, such as garbage, moving furniture, carrying weighted items on all surfaces without pain or limitation in 12 weeks  -SOME PROGRESS    Plan  Patient would benefit from: skilled physical therapy and PT eval  Planned modality interventions: thermotherapy: hydrocollator packs, cryotherapy and unattended electrical stimulation  Planned therapy interventions: manual therapy, neuromuscular re-education, patient education, self care, therapeutic activities, therapeutic exercise and home exercise program  Frequency: 2x week  Duration in weeks: 8  Treatment plan discussed with: patient        Subjective Evaluation    History of Present Illness  Mechanism of injury: Symptom onset approx 3 weeks ago  Woke with increased pain in low back region  Symptoms right sided  Intermittent symptoms to RLE  Intermittent symptoms noted  No left sided symptoms  No hx LBP  Denies altered sensation BLE       22:  2 month history of right shoulder pain without trauma  Pt does have recent history of right breast CA  Radiation was completed in 22:  Patient reports improvement in R shoulder pain since beginning skilled physical therapy  She reports more pain to lateral aspect of shoulder  She continues to have pain with reaching in front of body and overhead   She can sleep through part of the night on her R shoulder w/o pain but reports minimal pain at rest            Not a recurrent problem   Pain  Current pain rating: 3  At best pain rating: 3  At worst pain ratin  Location: LBP and lateral right shoulder referring into right lateral arm and elbow  Quality: sharp  Relieving factors: rest  Aggravating factors: sitting  Progression: worsening    Life stress: enjoys gardening, reading,     Patient Goals  Patient goals for therapy: increased strength, decreased pain and increased motion  Patient goal: decrease right shoulder pain and improve function        Objective     Postural Observations  Seated posture: fair    Additional Postural Observation Details  Elevated right pelvis and right shifted    7-25-22:  -Bilateral moderate rounded shoulders and forward head posture    Palpation     Right   Tenderness of the gluteus marilee, gluteus medius, pectoralis minor, piriformis, quadratus lumborum and supraspinatus  Trigger point to gluteus marilee, gluteus medius, levator scapulae, middle trapezius, piriformis and quadratus lumborum  Tenderness     Left Shoulder   Tenderness in the bicipital groove  Right Shoulder  Tenderness in the bicipital groove  Right Hip   Tenderness in the PSIS  Cervical/Thoracic Screen   Cervical range of motion within normal limits    Neurological Testing     Additional Neurological Details  Unremarkable  Unremarkable    Active Range of Motion     Right Shoulder   Flexion: 146 degrees with pain  Abduction: 112 degrees with pain  External rotation 90°: 60 degrees  Internal rotation 90°: 70 degrees     Lumbar   Flexion:  Restriction level: minimal  Extension:  Restriction level: moderate  Left rotation:  Restriction level: moderate  Right rotation:  Restriction level: moderate    Joint Play     Hypomobile: L2, L3, L4 and L5     Muscle Activation   Patient able to activate left transverse abdominals, left internal obliques, left multifidus, right transverse abdominals, right internal obliques and right multifidus  Tests     Right Shoulder   Positive empty can, Neer's and painful arc  Negative belly press, drop arm, lift-off, Speed's and bicep load test positive  Lumbar   Positive sacroiliac distraction   Right   Positive slump test    Negative passive SLR and quadrant  Right Hip   Positive long sit  Additional Tests Details  Resisted isometrics with IR/ ER are strong with slight pain        Precautions: Hx of CA       Manuals 8-15-22 8/17/22 8/22/22 8/24/22 8/30/22   [facility]/PROM R shoulder   10' 10' 10' 10'                           Neuro Re-Ed         MTP/LTP   Red 3x10 ea  Red 3x10 ea Red 3x10   Postural ed      scap retractions 2x10   Shrug/post roll   2#  30x 2# 30x 2# 30x                   Pt Ed/ HEP                Ther Ex        Nu Step L6 12' Pt deferred  L6 12' L3 12' L3 12'   LTR 10"x12 fadumo  10"x12 fadumo       PPT 10"x20 5"x20      SLTR-glute stretch 10"x12 fadumo        DKTC 10"x12 10"x12      tricep extensions      Red 2x10   Bicep curl   2#  30x 2# 30x 3# 30x   T-band ER/IR Red 30x Red x30 ea Red 30x ea Red x30 ea NV   AAROM flex/ER w/cane  20x 5" 20x 5" ea  2x10 5" ea 2x10 5" ea  2x10 5" ea    Pulleys  5' 5'  Flex 3x10 Flex 3x10 Flex 3x10   Long sitting hamstring stretch on plinth 4' fadumo        Supine cane punches     x15 x20   Ther Activity        PPT w/ marches 3x10 fadumo  3x10 fadumo       Isometric powerball crunches 5" x15       paloff press  Red 2x10 fadumo  Red 2x10 fadumo       MTP/LTP  grn 3x10                                      Gait Training                        Modalities        MHP Seated 8' Seated 10' LB and R shoulder  10' MHP LB, R shoulder  10' MHP LB, R shoulder  10' MHP LB, R shoulder

## 2022-08-30 NOTE — LETTER
2022    Jahaira Weiner MD  [address] [facility] [address] [facility] [place] [address]    Patient: Kyle Blount   YOB: 1940   Date of Visit: 2022     Encounter Diagnosis     ICD-10-CM    1  Right shoulder pain, unspecified chronicity  M25 511    2  Chronic low back pain, unspecified back pain laterality, unspecified whether sciatica present  M54 50     G89 29    3  Right lumbar radiculopathy  M54 16        Dear Dr Megan Malhotra:    Thank you for your recent referral of Kyle Blount  Please review the attached evaluation summary from Lori's recent visit  Please verify that you agree with the plan of care by signing the attached order  If you have any questions or concerns, please do not hesitate to call  I sincerely appreciate the opportunity to share in the care of one of your patients and hope to have another opportunity to work with you in the near future  Sincerely,    Nicole Guerrero      Referring Provider:      I certify that I have read the below Plan of Care and certify the need for these services furnished under this plan of treatment while under my care  Jahaira Weiner MD  [address] [place] [id number]  Via Fax: [phone]          PT Re-Evaluation  Collection of subjective/objective data performed by Nicole Guerrero PTA; Assessment, POC, and Goal attainment performed by Aleyda Lora DPT      Today's date: 2022  Patient name: Kyle Blount  : 1940  MRN: [id number]  Referring provider: Merry Mathews MD  Dx:   Encounter Diagnosis     ICD-10-CM    1  Right shoulder pain, unspecified chronicity  M25 511    2  Chronic low back pain, unspecified back pain laterality, unspecified whether sciatica present  M54 50     G89 29    3   Right lumbar radiculopathy  M54 16        Start Time: 1100          Assessment  Assessment details: Pt presents with signs and symptoms consistent with referring diagnosis, sciatica and right SI joint involvement  22:  -Pt's lumbar symptoms are improving well  Her right shoulder signs and symptoms are consistent with impingement and supraspinatus involvement  Impairments: abnormal or restricted ROM, impaired physical strength, lacks appropriate home exercise program, pain with function and poor posture     22:  Pt has improved overall  Pt's back is not a limitation at this point  She has improvement in overhead reaching mobility with limitations remaining in function overhead  She has returned to being able to garden as well as having less overall pain  She would still benefit from concentration on her shoulder in order to return to pre-morbid ability levels  Symptom irritability: moderateUnderstanding of Dx/Px/POC: good   Prognosis: good    Goals  ST) Pt  Will have centralized radicular symptoms in 6 weeks  -GOAL MET  2) Pt  Will be able to sit as long as desired without pain in 6 weeks  -EXCELLENT PROGRESS  3)  Pt  Will be able to perform all routine chores at home without pain or limitations in 6 weeks  -SOME PROGRESS 4) Pt  Will be able to perform all LE dressing tasks without limitations  -GOOD PROGRESS  LT) Pt  Will be timothy to stand as long as desired without pain in 12 weeks  -GOOD PROGRESS  2)  Pt  Will be able to perform all heavy activity at home, such as garbage, moving furniture, carrying weighted items on all surfaces without pain or limitation in 12 weeks    -SOME PROGRESS    Plan  Patient would benefit from: skilled physical therapy and PT eval  Planned modality interventions: thermotherapy: hydrocollator packs, cryotherapy and unattended electrical stimulation  Planned therapy interventions: manual therapy, neuromuscular re-education, patient education, self care, therapeutic activities, therapeutic exercise and home exercise program  Frequency: 2x week  Duration in weeks: 8  Treatment plan discussed with: patient        Subjective Evaluation    History of Present Illness  Mechanism of injury: Symptom onset approx 3 weeks ago  Woke with increased pain in low back region  Symptoms right sided  Intermittent symptoms to RLE  Intermittent symptoms noted  No left sided symptoms  No hx LBP  Denies altered sensation BLE       22:  2 month history of right shoulder pain without trauma  Pt does have recent history of right breast CA  Radiation was completed in 22:  Patient reports improvement in R shoulder pain since beginning skilled physical therapy  She reports more pain to lateral aspect of shoulder  She continues to have pain with reaching in front of body and overhead  She can sleep through part of the night on her R shoulder w/o pain but reports minimal pain at rest            Not a recurrent problem   Pain  Current pain rating: 3  At best pain rating: 3  At worst pain ratin  Location: LBP and lateral right shoulder referring into right lateral arm and elbow  Quality: sharp  Relieving factors: rest  Aggravating factors: sitting  Progression: worsening    Life stress: enjoys gardening, reading,     Patient Goals  Patient goals for therapy: increased strength, decreased pain and increased motion  Patient goal: decrease right shoulder pain and improve function        Objective     Postural Observations  Seated posture: fair    Additional Postural Observation Details  Elevated right pelvis and right shifted    22:  -Bilateral moderate rounded shoulders and forward head posture    Palpation     Right   Tenderness of the gluteus marilee, gluteus medius, pectoralis minor, piriformis, quadratus lumborum and supraspinatus  Trigger point to gluteus marilee, gluteus medius, levator scapulae, middle trapezius, piriformis and quadratus lumborum  Tenderness     Left Shoulder   Tenderness in the bicipital groove  Right Shoulder  Tenderness in the bicipital groove  Right Hip   Tenderness in the PSIS  Cervical/Thoracic Screen   Cervical range of motion within normal limits    Neurological Testing     Additional Neurological Details  Unremarkable  Unremarkable    Active Range of Motion     Right Shoulder   Flexion: 146 degrees with pain  Abduction: 112 degrees with pain  External rotation 90°: 60 degrees  Internal rotation 90°: 70 degrees     Lumbar   Flexion:  Restriction level: minimal  Extension:  Restriction level: moderate  Left rotation:  Restriction level: moderate  Right rotation:  Restriction level: moderate    Joint Play     Hypomobile: L2, L3, L4 and L5     Muscle Activation   Patient able to activate left transverse abdominals, left internal obliques, left multifidus, right transverse abdominals, right internal obliques and right multifidus  Tests     Right Shoulder   Positive empty can, Neer's and painful arc  Negative belly press, drop arm, lift-off, Speed's and bicep load test positive  Lumbar   Positive sacroiliac distraction   Right   Positive slump test    Negative passive SLR and quadrant  Right Hip   Positive long sit  Additional Tests Details  Resisted isometrics with IR/ ER are strong with slight pain        Precautions: Hx of CA       Manuals 8-15-22 8/17/22 8/22/22 8/24/22 8/30/22   STM/PROM R shoulder   10' 10' 10' 10'                           Neuro Re-Ed         MTP/LTP   Red 3x10 ea  Red 3x10 ea Red 3x10   Postural ed      scap retractions 2x10   Shrug/post roll   2#  30x 2# 30x 2# 30x                   Pt Ed/ HEP                Ther Ex        Nu Step L6 12' Pt deferred  L6 12' L3 12' L3 12'   LTR 10"x12 fadumo  10"x12 fadumo       PPT 10"x20 5"x20      SLTR-glute stretch 10"x12 fadumo        DKTC 10"x12 10"x12      tricep extensions      Red 2x10   Bicep curl   2#  30x 2# 30x 3# 30x   T-band ER/IR Red 30x Red x30 ea Red 30x ea Red x30 ea NV   AAROM flex/ER w/cane  20x 5" 20x 5" ea  2x10 5" ea 2x10 5" ea  2x10 5" ea    Pulleys  5' 5'  Flex 3x10 Flex 3x10 Flex 3x10   Long sitting hamstring stretch on plinth 4' fadumo        Supine cane punches     x15 x20   Ther Activity        PPT w/ marches 3x10 fadumo  3x10 fadumo       Isometric powerball crunches 5" x15       paloff press  Red 2x10 fadumo  Red 2x10 fadumo       MTP/LTP  grn 3x10                                      Gait Training                        Modalities        MHP Seated 8' Seated 10' LB and R shoulder  10' MHP LB, R shoulder  10' MHP LB, R shoulder  10' MHP LB, R shoulder

## 2022-09-02 ENCOUNTER — OFFICE VISIT (OUTPATIENT)
Dept: PHYSICAL THERAPY | Facility: CLINIC | Age: 82
End: 2022-09-02
Payer: MEDICARE

## 2022-09-02 DIAGNOSIS — M25.511 RIGHT SHOULDER PAIN, UNSPECIFIED CHRONICITY: Primary | ICD-10-CM

## 2022-09-02 PROCEDURE — 97110 THERAPEUTIC EXERCISES: CPT

## 2022-09-02 PROCEDURE — 97140 MANUAL THERAPY 1/> REGIONS: CPT

## 2022-09-02 PROCEDURE — 97112 NEUROMUSCULAR REEDUCATION: CPT

## 2022-09-02 NOTE — PROGRESS NOTES
Daily Note     Today's date: 2022  Patient name: Jerad Cat  : 1940  MRN: [id number]  Referring provider: Shukri Barriga MD  Dx:   Encounter Diagnosis     ICD-10-CM    1  Right shoulder pain, unspecified chronicity  M25 511        Start Time: 828          Subjective: Rox Ramos reports Her R shoulder is painful today most prominent to lateral aspect of delt  Objective: See treatment diary below      Assessment: Added STM to lateral deltoid with improvement in soft tissue quality/tone and decreased pain  Visual and VC to ensure correct exercise technique specifically with AAROM exercises to avoid increasing pain in R shoulder  Intermittent tactile cues for posterior tilt of R scap  Plan: Continue with current POC to address pt deficits        Precautions: Hx of CA       Manuals 22   STM/PROM R shoulder  15' + STM to lateral delt 10' 10' 10' 10'                           Neuro Re-Ed         MTP/LTP grn 2x10 ea  Red 3x10 ea  Red 3x10 ea Red 3x10   Postural ed      scap retractions 2x10   Shrug/post roll 30x  2#  30x 2# 30x 2# 30x                   Pt Ed/ HEP                Ther Ex        Nu Step L4 12' Pt deferred  L6 12' L3 12' L3 12'   LTR  10"x12 fadumo       PPT  5"x20      SLTR-glute stretch        DKTC  10"x12      tricep extensions  grn 2x10    Red 2x10   Bicep curl 3# 30x  2#  30x 2# 30x 3# 30x   T-band ER/IR  Red x30 ea Red 30x ea Red x30 ea NV   AAROM flex/ER w/cane  2x10 5" ea  20x 5" ea  2x10 5" ea 2x10 5" ea  2x10 5" ea    Pulleys  Flex 3x10 5'  Flex 3x10 Flex 3x10 Flex 3x10   Long sitting hamstring stretch on plinth        Supine cane punches  2x10   x15 x20   Ther Activity        PPT w/ marches  3x10 fadumo       Isometric powerball crunches        paloff press   Red 2x10 fadumo       MTP/LTP  grn 3x10                                      Gait Training                        Modalities        MHP 15' MHP LB, R shoulder  Seated 10' LB and R shoulder 10' MHP LB, R shoulder  10' MHP LB, R shoulder  10' MHP LB, R shoulder

## 2022-09-06 ENCOUNTER — OFFICE VISIT (OUTPATIENT)
Dept: PHYSICAL THERAPY | Facility: CLINIC | Age: 82
End: 2022-09-06
Payer: MEDICARE

## 2022-09-06 DIAGNOSIS — M25.511 RIGHT SHOULDER PAIN, UNSPECIFIED CHRONICITY: Primary | ICD-10-CM

## 2022-09-06 PROCEDURE — 97140 MANUAL THERAPY 1/> REGIONS: CPT

## 2022-09-06 PROCEDURE — 97110 THERAPEUTIC EXERCISES: CPT

## 2022-09-06 NOTE — PROGRESS NOTES
Daily Note     Today's date: 2022  Patient name: Fang Pagan  : 1940  MRN: [id number]  Referring provider: Manuel Lee MD  Dx: /  Encounter Diagnosis     ICD-10-CM    1  Right shoulder pain, unspecified chronicity  M25 511        Start Time: 819          Subjective: Osorio Hunter reports that her R shoulder is feeling pretty good today  Objective: See treatment diary below      Assessment: Improvement in soft tissue quality following manual STM to lateral deltoid  Visual and VC to ensure correct exercise technique and scap positioning  Most difficulty with resisted external rotation but was able to perform with shortened ROM at this time  Pt would continue to benefit from skilled PT  Plan: Continue with current POC to address pt deficits        Precautions: Hx of CA       Manuals 22   STM/PROM R shoulder  15' + STM to lateral delt 15' +STM to lat delt  10' 10' 10'                           Neuro Re-Ed         MTP/LTP grn 2x10 ea grn 2x10 ea Red 3x10 ea  Red 3x10 ea Red 3x10   Postural ed      scap retractions 2x10   Shrug/post roll 30x 30x 2#  30x 2# 30x 2# 30x                   Pt Ed/ HEP                Ther Ex        Nu Step L4 12' L5 12' L6 12' L3 12' L3 12'   LTR        PPT        SLTR-glute stretch        DKTC        tricep extensions  grn 2x10 grn 2x10   Red 2x10   Bicep curl 3# 30x NV 2#  30x 2# 30x 3# 30x   T-band ER/IR  Red 2x10 ea dir  Red 30x ea Red x30 ea NV   AAROM flex/ER w/cane  2x10 5" ea  2x10 5" ea  2x10 5" ea 2x10 5" ea  2x10 5" ea    Pulleys  Flex 3x10 Flex 3x10 Flex 3x10 Flex 3x10 Flex 3x10   Long sitting hamstring stretch on plinth        Supine cane punches  2x10 2x10  x15 x20   Ther Activity        PPT w/ marches        Isometric powerball crunches        paloff press         MTP/LTP                                        Gait Training                        Modalities        MHP 15' MHP LB, R shoulder  15' MHP LB, R shoulder  10' DWIGHTP LB, R shoulder  10' DWIGHTP LB, R shoulder  10' LIMA LB, R shoulder

## 2022-09-08 ENCOUNTER — OFFICE VISIT (OUTPATIENT)
Dept: PHYSICAL THERAPY | Facility: CLINIC | Age: 82
End: 2022-09-08
Payer: MEDICARE

## 2022-09-08 DIAGNOSIS — M25.511 RIGHT SHOULDER PAIN, UNSPECIFIED CHRONICITY: Primary | ICD-10-CM

## 2022-09-08 PROCEDURE — 97110 THERAPEUTIC EXERCISES: CPT

## 2022-09-08 PROCEDURE — 97140 MANUAL THERAPY 1/> REGIONS: CPT

## 2022-09-08 PROCEDURE — 97112 NEUROMUSCULAR REEDUCATION: CPT

## 2022-09-08 NOTE — PROGRESS NOTES
Daily Note     Today's date: 2022  Patient name: Semaj Corley  : 1940  MRN: [id number]  Referring provider: Jimmy Espinoza MD  Dx:   Encounter Diagnosis     ICD-10-CM    1  Right shoulder pain, unspecified chronicity  M25 511        Start Time: 818          Subjective: Fredy Casey reports R shoulder "not good today" as she is experiencing pain in R shoulder/upper arm and wrist; she describes the wrist and upper arm pain as dull  She reports when her posture is improved she feels better  Objective: See treatment diary below      Assessment: Emphasis on postural exercises this visit; tactile and visual cues to ensure posterior tilt of fadumo scap  Educated on benefits of exercises performed; pt verbalized understanding  Improvement in soft tissue quality following STM to lateral delt  Pt would continue to benefit from skilled PT  Plan: Continue with current POC to address pt deficits        Precautions: Hx of CA       Manuals 22   STM/PROM R shoulder  15' + STM to lateral delt 15' +STM to lat delt  15' + STM to lat/post delt  10' 10'                           Neuro Re-Ed         MTP/LTP grn 2x10 ea grn 2x10 ea  Red 3x10 ea Red 3x10   Postural ed      scap retractions 2x10   Shrug/post roll 30x 30x 30x 2# 30x 2# 30x   Shoulder isometrics    5" x10 all dir      scap retract    5" x20     Pt Ed/ HEP                Ther Ex        Nu Step L4 12' L5 12' L5 12' L3 12' L3 12'   LTR        PPT        SLTR-glute stretch        DKTC        tricep extensions  grn 2x10 grn 2x10   Red 2x10   Bicep curl 3# 30x NV  2# 30x 3# 30x   T-band ER/IR  Red 2x10 ea dir   Red x30 ea NV   AAROM flex/ER w/cane  2x10 5" ea  2x10 5" ea  2x10 5" ea  2x10 5" ea  2x10 5" ea    Pulleys  Flex 3x10 Flex 3x10 Flex 3x10 Flex 3x10 Flex 3x10   Thoracic extension   5" x20     Long sitting hamstring stretch on plinth        Supine cane punches  2x10 2x10 2x10 x15 x20   Ther Activity        PPT w/  Isometric powerball crunches        paloff press         MTP/LTP                                        Gait Training                        Modalities        MHP 15' MHP LB, R shoulder  15' MHP LB, R shoulder  15'  10' MHP LB, R shoulder  10' MHP LB, R shoulder

## 2022-09-13 ENCOUNTER — TELEPHONE (OUTPATIENT)
Dept: FAMILY MEDICINE CLINIC | Facility: CLINIC | Age: 82
End: 2022-09-13

## 2022-09-13 DIAGNOSIS — R30.0 DYSURIA: Primary | ICD-10-CM

## 2022-09-13 RX ORDER — AMOXICILLIN AND CLAVULANATE POTASSIUM 875; 125 MG/1; MG/1
1 TABLET, FILM COATED ORAL EVERY 12 HOURS SCHEDULED
Qty: 14 TABLET | Refills: 0 | Status: SHIPPED | OUTPATIENT
Start: 2022-09-13 | End: 2022-09-20

## 2022-09-13 NOTE — TELEPHONE ENCOUNTER
Pt has a uti     frequent urination and discomfort    is asking if  would prescribe an ABX for her ???

## 2022-09-14 ENCOUNTER — OFFICE VISIT (OUTPATIENT)
Dept: PHYSICAL THERAPY | Facility: CLINIC | Age: 82
End: 2022-09-14
Payer: MEDICARE

## 2022-09-14 DIAGNOSIS — M25.511 RIGHT SHOULDER PAIN, UNSPECIFIED CHRONICITY: Primary | ICD-10-CM

## 2022-09-14 PROCEDURE — 97112 NEUROMUSCULAR REEDUCATION: CPT

## 2022-09-14 PROCEDURE — 97140 MANUAL THERAPY 1/> REGIONS: CPT

## 2022-09-14 NOTE — PROGRESS NOTES
Daily Note     Today's date: 2022  Patient name: Dulce Rush  : 1940  MRN: [id number]  Referring provider: Annita Parmar MD  Dx:   Encounter Diagnosis     ICD-10-CM    1  Right shoulder pain, unspecified chronicity  M25 511                   Subjective: Alejo Mccracken feels she did well with exercises from last visit in reference to R shoulder  She has f/u with ortho next Friday  Objective: See treatment diary below      Assessment: Tenderness upon palpation to posterior deltoid but tissue quality/tone did improve with STM  Most limited in abduction at this time noted during PROM  Tactile cues to ensure posterior tilt of R shoulder  Pt would continue to benefit from skilled PT  Plan: Continue with current POC to address pt deficits        Precautions: Hx of CA       Manuals 22   STM/PROM R shoulder  15' + STM to lateral delt 15' +STM to lat delt  15' + STM to lat/post delt  10' +STM to lat/post delt 10'                           Neuro Re-Ed         MTP/LTP grn 2x10 ea grn 2x10 ea  grn 2x10 ea Red 3x10   Postural ed      scap retractions 2x10   Shrug/post roll 30x 30x 30x 30x 2# 30x   Shoulder isometrics    5" x10 all dir  5" x10 all dir     scap retract    5" x20     Pt Ed/ HEP                Ther Ex        Nu Step L4 12' L5 12' L5 12' L5 12' L3 12'   LTR        PPT        SLTR-glute stretch        DKTC        tricep extensions  grn 2x10 grn 2x10  grn 2x10 Red 2x10   Bicep curl 3# 30x NV  3# 20x 3# 30x   T-band ER/IR  Red 2x10 ea dir    NV   AAROM flex/ER w/cane  2x10 5" ea  2x10 5" ea  2x10 5" ea  2x10 5" ea  2x10 5" ea    Pulleys  Flex 3x10 Flex 3x10 Flex 3x10 Flex 3x10 ea Flex 3x10   Thoracic extension   5" x20     Long sitting hamstring stretch on plinth        Supine cane punches  2x10 2x10 2x10 2x10 x20   Ther Activity        PPT w/ marches        Isometric powerball crunches        paloff press         MTP/LTP                                        Gait Training                        Modalities        MHP 15' MHP LB, R shoulder  15' MHP LB, R shoulder  15'  10' 10' MHP LB, R shoulder

## 2022-09-16 ENCOUNTER — OFFICE VISIT (OUTPATIENT)
Dept: PHYSICAL THERAPY | Facility: CLINIC | Age: 82
End: 2022-09-16
Payer: MEDICARE

## 2022-09-16 DIAGNOSIS — M25.511 RIGHT SHOULDER PAIN, UNSPECIFIED CHRONICITY: Primary | ICD-10-CM

## 2022-09-16 PROCEDURE — 97112 NEUROMUSCULAR REEDUCATION: CPT

## 2022-09-16 PROCEDURE — 97140 MANUAL THERAPY 1/> REGIONS: CPT

## 2022-09-16 PROCEDURE — 97110 THERAPEUTIC EXERCISES: CPT

## 2022-09-16 NOTE — PROGRESS NOTES
Daily Note     Today's date: 2022  Patient name: Oral Casiano  : 1940  MRN: [id number]  Referring provider: Sam Borden MD  Dx:   Encounter Diagnosis     ICD-10-CM    1  Right shoulder pain, unspecified chronicity  M25 511                   Subjective: Lori reports soreness in R lateral shoulder after driving two hours each direction but denies any increases in pain  Objective: See treatment diary below      Assessment: Visual and tactile cues to ensure correct exercise technique and scap positioning; less pain associated with exercises with upright posture  Improvement in soft tissue quality following manuals to lat/post deltoid  Progress as able with emphasis on scap positioning/postural awareness  Plan: Continue with current POC to address pt deficits        Precautions: Hx of CA       Manuals 22   STM/PROM R shoulder  15' + STM to lateral delt 15' +STM to lat delt  15' + STM to lat/post delt  10' +STM to lat/post delt 10' +STM to lat/post delt                            Neuro Re-Ed         MTP/LTP grn 2x10 ea grn 2x10 ea  grn 2x10 ea grn 2x10 ea   Postural ed         Shrug/post roll 30x 30x 30x 30x    Shoulder isometrics    5" x10 all dir  5" x10 all dir  5" x10 all dir   scap retract    5" x20     Pt Ed/ HEP                Ther Ex        Nu Step L4 12' L5 12' L5 12' L5 12' L6 15'   LTR        PPT        SLTR-glute stretch        DKTC        tricep extensions  grn 2x10 grn 2x10  grn 2x10 grn 2x10   Bicep curl 3# 30x NV  3# 20x 3# 20x   T-band ER/IR  Red 2x10 ea dir       AAROM flex/ER w/cane  2x10 5" ea  2x10 5" ea  2x10 5" ea  2x10 5" ea  20x 5"    Pulleys  Flex 3x10 Flex 3x10 Flex 3x10 Flex 3x10 ea 30x   Thoracic extension   5" x20     Long sitting hamstring stretch on plinth        Supine cane punches  2x10 2x10 2x10 2x10 20x    Ther Activity        PPT w/ marches        Isometric powerball crunches        paloff press         MTP/LTP Gait Training                        Modalities        MHP 15' LIMA WRAY, R shoulder  15' P LB, R shoulder  15'  10' 10'

## 2022-09-20 ENCOUNTER — OFFICE VISIT (OUTPATIENT)
Dept: PHYSICAL THERAPY | Facility: CLINIC | Age: 82
End: 2022-09-20
Payer: MEDICARE

## 2022-09-20 DIAGNOSIS — M25.511 RIGHT SHOULDER PAIN, UNSPECIFIED CHRONICITY: Primary | ICD-10-CM

## 2022-09-20 PROCEDURE — 97140 MANUAL THERAPY 1/> REGIONS: CPT

## 2022-09-20 PROCEDURE — 97110 THERAPEUTIC EXERCISES: CPT

## 2022-09-20 PROCEDURE — 97112 NEUROMUSCULAR REEDUCATION: CPT

## 2022-09-20 NOTE — PROGRESS NOTES
Daily Note     Today's date: 2022  Patient name: Seth Newman  : 1940  MRN: [id number]  Referring provider: Erlinda Hernandez MD  Dx:   Encounter Diagnosis     ICD-10-CM    1  Right shoulder pain, unspecified chronicity  M25 511        Start Time: 8608          Subjective:Lori reports her R shoulder is doing fine today  She reports more pain with activity  Objective: See treatment diary below      Assessment: Improvement in soft tissue quality to lat/post deltoid following STM; improvement in overall PROM  Added 1# cuff weight to supine cane punches to improve upward rotation of scap  Patient denied any increases in pain with exercises performed  Progress as able  Plan: Continue with current POC to address pt deficits        Precautions: Hx of CA       Manuals 22   STM/PROM R shoulder  12' +STM to lat/post delt  15' +STM to lat delt  15' + STM to lat/post delt  10' +STM to lat/post delt 10' +STM to lat/post delt                            Neuro Re-Ed         MTP/LTP grn 2x10 ea grn 2x10 ea  grn 2x10 ea grn 2x10 ea   Postural ed         Shrug/post roll  30x 30x 30x    Shoulder isometrics  5" x15 all dir   5" x10 all dir  5" x10 all dir  5" x10 all dir   scap retract    5" x20     Pt Ed/ HEP                Ther Ex        Nu Step L6 15' L5 12' L5 12' L5 12' L6 15'   LTR        PPT        SLTR-glute stretch        DKTC        tricep extensions   grn 2x10  grn 2x10 grn 2x10   Bicep curl 3# 20x NV  3# 20x 3# 20x   T-band ER/IR  Red 2x10 ea dir       AAROM flex/ER w/cane  20x 5"  2x10 5" ea  2x10 5" ea  2x10 5" ea  20x 5"    Pulleys  30x Flex 3x10 Flex 3x10 Flex 3x10 ea 30x   Thoracic extension   5" x20     Long sitting hamstring stretch on plinth        Supine cane punches  20x 1#  2x10 2x10 2x10 20x    Ther Activity        PPT w/ marches        Isometric powerball crunches        paloff press         MTP/LTP                                        Gait Training                        Modalities        [facility] 10' 15' LIMA WRAY R shoulder  15'  10' 10'

## 2022-09-22 ENCOUNTER — OFFICE VISIT (OUTPATIENT)
Dept: PHYSICAL THERAPY | Facility: CLINIC | Age: 82
End: 2022-09-22
Payer: MEDICARE

## 2022-09-22 DIAGNOSIS — M25.511 RIGHT SHOULDER PAIN, UNSPECIFIED CHRONICITY: Primary | ICD-10-CM

## 2022-09-22 PROCEDURE — 97110 THERAPEUTIC EXERCISES: CPT

## 2022-09-22 PROCEDURE — 97140 MANUAL THERAPY 1/> REGIONS: CPT

## 2022-09-22 PROCEDURE — 97112 NEUROMUSCULAR REEDUCATION: CPT

## 2022-09-22 NOTE — PROGRESS NOTES
Daily Note     Today's date: 2022  Patient name: Carmen Bustamante  : 1940  MRN: [id number]  Referring provider: Albert Sadler MD  Dx:   Encounter Diagnosis     ICD-10-CM    1  Right shoulder pain, unspecified chronicity  M25 511                   Subjective: Dione Rosas reports feeling good today in reference to R shoulder pain  Objective: See treatment diary below      Assessment: Improvement in soft tissue quality/tone following STM to lat/posterior deltoid  Overall postural awareness is improving with less need for therapist intervention for posterior tilt of scap  Progress as able  Plan: Continue with current POC to address pt deficits        Precautions: Hx of CA       Manuals 22   STM/PROM R shoulder  12' +STM to lat/post delt  12' STM to lat/post delt  15' + STM to lat/post delt  10' +STM to lat/post delt 10' +STM to lat/post delt                            Neuro Re-Ed         MTP/LTP grn 2x10 ea Red (grn unavail) 3x10 ea  grn 2x10 ea grn 2x10 ea   Postural ed         Shrug/post roll   30x 30x    Shoulder isometrics  5" x15 all dir  5" x15 all dir  5" x10 all dir  5" x10 all dir  5" x10 all dir   scap retract    5" x20     Pt Ed/ HEP                Ther Ex        Nu Step L6 15' L6 15' L5 12' L5 12' L6 15'   LTR        PPT        SLTR-glute stretch        DKTC        tricep extensions     grn 2x10 grn 2x10   Bicep curl 3# 20x 3# 20x  3# 20x 3# 20x   T-band ER/IR        AAROM flex/ER w/cane  20x 5"  20x 5"  2x10 5" ea  2x10 5" ea  20x 5"    Pulleys  30x 30x Flex 3x10 Flex 3x10 ea 30x   Thoracic extension   5" x20     Long sitting hamstring stretch on plinth        Supine cane punches  20x 1#  20x 1#  2x10 2x10 20x    Ther Activity        PPT w/ marches        Isometric powerball crunches        paloff press         MTP/LTP                                        Gait Training                        Modalities        MHP 10' 10' 15'  10' 10'

## 2022-09-28 ENCOUNTER — OFFICE VISIT (OUTPATIENT)
Dept: PHYSICAL THERAPY | Facility: CLINIC | Age: 82
End: 2022-09-28
Payer: MEDICARE

## 2022-09-28 DIAGNOSIS — M25.511 RIGHT SHOULDER PAIN, UNSPECIFIED CHRONICITY: Primary | ICD-10-CM

## 2022-09-28 DIAGNOSIS — M54.16 RIGHT LUMBAR RADICULOPATHY: ICD-10-CM

## 2022-09-28 DIAGNOSIS — G89.29 CHRONIC LOW BACK PAIN, UNSPECIFIED BACK PAIN LATERALITY, UNSPECIFIED WHETHER SCIATICA PRESENT: ICD-10-CM

## 2022-09-28 DIAGNOSIS — M54.50 CHRONIC LOW BACK PAIN, UNSPECIFIED BACK PAIN LATERALITY, UNSPECIFIED WHETHER SCIATICA PRESENT: ICD-10-CM

## 2022-09-28 PROCEDURE — 97112 NEUROMUSCULAR REEDUCATION: CPT

## 2022-09-28 PROCEDURE — 97140 MANUAL THERAPY 1/> REGIONS: CPT

## 2022-09-28 PROCEDURE — 97110 THERAPEUTIC EXERCISES: CPT

## 2022-09-28 PROCEDURE — 97010 HOT OR COLD PACKS THERAPY: CPT

## 2022-09-28 NOTE — PROGRESS NOTES
Daily Note     Today's date: 2022  Patient name: Tosha July  : 1940  MRN: [id number]  Referring provider: Tyler Mott MD  Dx:   Encounter Diagnosis     ICD-10-CM    1  Right shoulder pain, unspecified chronicity  M25 511    2  Chronic low back pain, unspecified back pain laterality, unspecified whether sciatica present  M54 50     G89 29    3  Right lumbar radiculopathy  M54 16                   Subjective: No new complaints      Objective: See treatment diary below      Assessment: Tolerated treatment well  Patient would benefit from continued PT      Plan: Progress treatment as tolerated         Precautions: Hx of CA       Manuals 22   STM/PROM R shoulder  12' +STM to lat/post delt  12' STM to lat/post delt  15' + STM to lat/post delt  10' +STM to lat/post delt 10' +STM to lat/post delt                            Neuro Re-Ed         MTP/LTP grn 2x10 ea Red (grn unavail) 3x10 ea Green 3x10 grn 2x10 ea grn 2x10 ea   Postural ed         Shrug/post roll   30x 30x    Shoulder isometrics  5" x15 all dir  5" x15 all dir  5" x10 all dir  5" x10 all dir  5" x10 all dir   scap retract    5" x20     Pt Ed/ HEP                Ther Ex        Nu Step L6 15' L6 15' L5 12' L5 12' L6 15'   LTR        PPT        SLTR-glute stretch        DKTC        tricep extensions     grn 2x10 grn 2x10   Bicep curl 3# 20x 3# 20x  3# 20x 3# 20x   T-band ER/IR        AAROM flex/ER w/cane  20x 5"  20x 5"  2x10 5" ea  2x10 5" ea  20x 5"    Pulleys  30x 30x Flex 3x10 Flex 3x10 ea 30x   Thoracic extension   5" x20     Long sitting hamstring stretch on plinth        Supine cane punches  20x 1#  20x 1#  2x10 2x10 20x    Ther Activity        PPT w/ marches        Isometric powerball crunches        paloff press         MTP/LTP                                        Gait Training                        Modalities        MHP 10' 10' 15'  10' 10'

## 2022-09-30 ENCOUNTER — OFFICE VISIT (OUTPATIENT)
Dept: PHYSICAL THERAPY | Facility: CLINIC | Age: 82
End: 2022-09-30
Payer: MEDICARE

## 2022-09-30 DIAGNOSIS — M54.16 RIGHT LUMBAR RADICULOPATHY: ICD-10-CM

## 2022-09-30 DIAGNOSIS — G89.29 CHRONIC LOW BACK PAIN, UNSPECIFIED BACK PAIN LATERALITY, UNSPECIFIED WHETHER SCIATICA PRESENT: ICD-10-CM

## 2022-09-30 DIAGNOSIS — M54.50 CHRONIC LOW BACK PAIN, UNSPECIFIED BACK PAIN LATERALITY, UNSPECIFIED WHETHER SCIATICA PRESENT: ICD-10-CM

## 2022-09-30 DIAGNOSIS — M25.511 RIGHT SHOULDER PAIN, UNSPECIFIED CHRONICITY: Primary | ICD-10-CM

## 2022-09-30 PROCEDURE — 97110 THERAPEUTIC EXERCISES: CPT

## 2022-09-30 PROCEDURE — 97140 MANUAL THERAPY 1/> REGIONS: CPT

## 2022-09-30 NOTE — PROGRESS NOTES
Daily Note     Today's date: 2022  Patient name: Clifton Nino  : 1940  MRN: [id number]  Referring provider: Sarai Thomas MD  Dx:   Encounter Diagnosis     ICD-10-CM    1  Right shoulder pain, unspecified chronicity  M25 511    2  Chronic low back pain, unspecified back pain laterality, unspecified whether sciatica present  M54 50     G89 29    3  Right lumbar radiculopathy  M54 16                Subjective: no new complaint      Objective: See treatment diary below      Assessment: Tolerated treatment well  Patient would benefit from continued PT      Plan: Progress treatment as tolerated         Precautions: Hx of CA       Manuals 22   STM/PROM R shoulder  12' +STM to lat/post delt  12' STM to lat/post delt  15' + STM to lat/post delt  105 +STM to lat/post delt 10' +STM to lat/post delt                            Neuro Re-Ed         MTP/LTP grn 2x10 ea Red (grn unavail) 3x10 ea Green 3x10 grn 3x10 ea grn 2x10 ea   Postural ed         Shrug/post roll   30x 30x    Shoulder isometrics  5" x15 all dir  5" x15 all dir  5" x10 all dir  5" x10 all dir  5" x10 all dir   scap retract    5" x20     Pt Ed/ HEP                Ther Ex        Nu Step L6 15' L6 15' L5 12' L6 12' L6 15'   LTR        PPT        SLTR-glute stretch        DKTC        tricep extensions     grn 3x10 grn 2x10   Bicep curl 3# 20x 3# 20x  3# 20x 3# 20x   T-band ER/IR        AAROM flex/ER w/cane  20x 5"  20x 5"  2x10 5" ea  2x10 5" ea  20x 5"    Pulleys  30x 30x Flex 3x10 Flex 3x10 ea 30x   Thoracic extension   5" x20     Long sitting hamstring stretch on plinth        Supine cane punches  20x 1#  20x 1#  2x10 2x10 20x    Ther Activity        PPT w/ marches        Isometric powerball crunches        paloff press         MTP/LTP                                        Gait Training                        Modalities        MHP 10' 10' 15'  10' 10'

## 2022-10-04 ENCOUNTER — OFFICE VISIT (OUTPATIENT)
Dept: PHYSICAL THERAPY | Facility: CLINIC | Age: 82
End: 2022-10-04
Payer: MEDICARE

## 2022-10-04 DIAGNOSIS — M25.511 RIGHT SHOULDER PAIN, UNSPECIFIED CHRONICITY: Primary | ICD-10-CM

## 2022-10-04 PROCEDURE — 97140 MANUAL THERAPY 1/> REGIONS: CPT

## 2022-10-04 PROCEDURE — 97110 THERAPEUTIC EXERCISES: CPT

## 2022-10-04 NOTE — PROGRESS NOTES
Daily Note     Today's date: 10/4/2022  Patient name: Oral Casiano  : 1940  MRN: [id number]  Referring provider: Sam Borden MD  Dx:   Encounter Diagnosis     ICD-10-CM    1  Right shoulder pain, unspecified chronicity  M25 511                   Subjective: Patient reports that her doctor would like to put a hold on PT at this time to see if her RTC musculature will heal        Objective: See treatment diary below      Assessment: Tolerated therapy session well reporting no increases in pain to R shoulder  Improvement in soft tissue quality/tone to lateral/posterior shoulder following STM  Pt would benefit from performing HEP at this time  Plan: Continue with current POC to address pt deficits       Precautions: Hx of CA       Manuals 9/20/22 9/22/22 9/28/22 9/30/22 10/4/22   STM/PROM R shoulder  12' +STM to lat/post delt  12' STM to lat/post delt  15' + STM to lat/post delt  105 +STM to lat/post delt 10' STM to lat/post delt                           Neuro Re-Ed         MTP/LTP grn 2x10 ea Red (grn unavail) 3x10 ea Green 3x10 grn 3x10 ea grn 30x ea   Postural ed         Shrug/post roll   30x 30x 30x   Shoulder isometrics  5" x15 all dir  5" x15 all dir  5" x10 all dir  5" x10 all dir  5"x15 all dir    scap retract    5" x20     Pt Ed/ HEP                Ther Ex        Nu Step L6 15' L6 15' L5 12' L6 12' L6 12'   LTR        PPT        SLTR-glute stretch        DKTC        tricep extensions     grn 3x10    Bicep curl 3# 20x 3# 20x  3# 20x    T-band ER/IR        AAROM flex/ER w/cane  20x 5"  20x 5"  2x10 5" ea  2x10 5" ea  20x 5" ea    Pulleys  30x 30x Flex 3x10 Flex 3x10 ea Flex x30   Thoracic extension   5" x20     Long sitting hamstring stretch on plinth        Supine cane punches  20x 1#  20x 1#  2x10 2x10 20x   Ther Activity        PPT w/ marches        Isometric powerball crunches        paloff press         MTP/LTP                                        Gait Training Modalities        [facility] 10' 10' 15'  10' 10'

## 2022-10-06 ENCOUNTER — APPOINTMENT (OUTPATIENT)
Dept: PHYSICAL THERAPY | Facility: CLINIC | Age: 82
End: 2022-10-06

## 2022-10-12 PROBLEM — Z00.00 MEDICARE ANNUAL WELLNESS VISIT, INITIAL: Status: RESOLVED | Noted: 2021-11-09 | Resolved: 2022-10-12

## 2022-10-13 NOTE — PROGRESS NOTES
PT Discharge Summary    Today's date: 10/13/2022  Patient name: Saul Beltran  : 1940  MRN: [id number]  Referring provider: Jerald Hendricks MD  Dx:   Encounter Diagnosis     ICD-10-CM    1  Right shoulder pain, unspecified chronicity  M25 511    2  Chronic low back pain, unspecified back pain laterality, unspecified whether sciatica present  M54 50     G89 29    3  Right lumbar radiculopathy  M54 16        Start Time: 1100  Stop Time: 1205  Total time in clinic (min): 65 minutes    Assessment  Assessment details: Pt presents with signs and symptoms consistent with referring diagnosis, sciatica and right SI joint involvement  22:  -Pt's lumbar symptoms are improving well  Her right shoulder signs and symptoms are consistent with impingement and supraspinatus involvement  10-13-22:  Pt has reached max benefit from PT and should be discharged  Impairments: abnormal or restricted ROM, impaired physical strength, lacks appropriate home exercise program, pain with function and poor posture     Symptom irritability: moderateUnderstanding of Dx/Px/POC: good   Prognosis: good    Goals  ST) Pt  Will have centralized radicular symptoms in 6 weeks  -GOAL MET  2) Pt  Will be able to sit as long as desired without pain in 6 weeks  -EXCELLENT PROGRESS  3)  Pt  Will be able to perform all routine chores at home without pain or limitations in 6 weeks  -SOME PROGRESS 4) Pt  Will be able to perform all LE dressing tasks without limitations  -GOOD PROGRESS  LT) Pt  Will be timothy to stand as long as desired without pain in 12 weeks  -GOOD PROGRESS  2)  Pt  Will be able to perform all heavy activity at home, such as garbage, moving furniture, carrying weighted items on all surfaces without pain or limitation in 12 weeks  -SOME PROGRESS    Plan  Plan details: Pt discharged from PT    Treatment plan discussed with: patient        Subjective Evaluation    History of Present Illness  Mechanism of injury: Symptom onset approx 3 weeks ago  Woke with increased pain in low back region  Symptoms right sided  Intermittent symptoms to RLE  Intermittent symptoms noted  No left sided symptoms  No hx LBP  Denies altered sensation BLE       7-25-22:  2 month history of right shoulder pain without trauma  Pt does have recent history of right breast CA  Radiation was completed in December 2021  Not a recurrent problem   Pain  Current pain rating: 3  At best pain rating: 3  At worst pain rating: 10  Location: LBP and lateral right shoulder referring into right lateral arm and elbow  Quality: sharp  Relieving factors: rest  Aggravating factors: sitting  Progression: worsening    Life stress: enjoys gardening, reading,     Patient Goals  Patient goals for therapy: increased strength, decreased pain and increased motion  Patient goal: decrease right shoulder pain and improve function        Objective     Postural Observations  Seated posture: fair    Additional Postural Observation Details  Elevated right pelvis and right shifted    7-25-22:  -Bilateral moderate rounded shoulders and forward head posture    Palpation     Right   Tenderness of the gluteus marilee, gluteus medius, pectoralis minor, piriformis, quadratus lumborum and supraspinatus  Trigger point to gluteus marilee, gluteus medius, levator scapulae, middle trapezius, piriformis and quadratus lumborum  Tenderness     Left Shoulder   Tenderness in the bicipital groove  Right Shoulder  Tenderness in the bicipital groove  Right Hip   Tenderness in the PSIS       Cervical/Thoracic Screen   Cervical range of motion within normal limits    Neurological Testing     Additional Neurological Details  Unremarkable  Unremarkable    Active Range of Motion     Right Shoulder   Flexion: 110 degrees with pain  Abduction: 100 degrees with pain  External rotation 90°: 60 degrees  Internal rotation 90°: 70 degrees     Lumbar   Flexion:  Restriction level: minimal  Extension:  Restriction level: moderate  Left rotation:  Restriction level: moderate  Right rotation:  Restriction level: moderate    Joint Play     Hypomobile: L2, L3, L4 and L5     Muscle Activation   Patient able to activate left transverse abdominals, left internal obliques, left multifidus, right transverse abdominals, right internal obliques and right multifidus  Tests     Right Shoulder   Positive empty can, Neer's and painful arc  Negative belly press, drop arm, lift-off, Speed's and bicep load test positive  Lumbar   Positive sacroiliac distraction   Right   Positive slump test    Negative passive SLR and quadrant  Right Hip   Positive long sit  Additional Tests Details  Resisted isometrics with IR/ ER are strong with slight pain

## 2022-10-17 DIAGNOSIS — E28.39 MENOPAUSE OVARIAN FAILURE: ICD-10-CM

## 2022-11-15 ENCOUNTER — OFFICE VISIT (OUTPATIENT)
Dept: FAMILY MEDICINE CLINIC | Facility: CLINIC | Age: 82
End: 2022-11-15

## 2022-11-15 ENCOUNTER — APPOINTMENT (OUTPATIENT)
Dept: LAB | Facility: CLINIC | Age: 82
End: 2022-11-15

## 2022-11-15 VITALS
BODY MASS INDEX: 28 KG/M2 | HEART RATE: 64 BPM | HEIGHT: 64 IN | TEMPERATURE: 97.8 F | OXYGEN SATURATION: 99 % | DIASTOLIC BLOOD PRESSURE: 78 MMHG | SYSTOLIC BLOOD PRESSURE: 122 MMHG | WEIGHT: 164 LBS

## 2022-11-15 DIAGNOSIS — E78.5 HYPERLIPIDEMIA, UNSPECIFIED HYPERLIPIDEMIA TYPE: ICD-10-CM

## 2022-11-15 DIAGNOSIS — R73.01 IMPAIRED FASTING GLUCOSE: ICD-10-CM

## 2022-11-15 DIAGNOSIS — Z00.00 MEDICARE ANNUAL WELLNESS VISIT, SUBSEQUENT: ICD-10-CM

## 2022-11-15 DIAGNOSIS — D69.6 PLATELETS DECREASED (HCC): ICD-10-CM

## 2022-11-15 DIAGNOSIS — D64.9 ANEMIA, UNSPECIFIED TYPE: ICD-10-CM

## 2022-11-15 DIAGNOSIS — R30.0 DYSURIA: ICD-10-CM

## 2022-11-15 DIAGNOSIS — R00.1 BRADYCARDIA: Primary | ICD-10-CM

## 2022-11-15 LAB
ALBUMIN SERPL BCP-MCNC: 3.6 G/DL (ref 3.5–5)
ALP SERPL-CCNC: 68 U/L (ref 46–116)
ALT SERPL W P-5'-P-CCNC: 24 U/L (ref 12–78)
ANION GAP SERPL CALCULATED.3IONS-SCNC: 3 MMOL/L (ref 4–13)
AST SERPL W P-5'-P-CCNC: 17 U/L (ref 5–45)
BASOPHILS # BLD AUTO: 0.06 THOUSANDS/ÂΜL (ref 0–0.1)
BASOPHILS NFR BLD AUTO: 1 % (ref 0–1)
BILIRUB SERPL-MCNC: 0.34 MG/DL (ref 0.2–1)
BUN SERPL-MCNC: 19 MG/DL (ref 5–25)
CALCIUM SERPL-MCNC: 9.6 MG/DL (ref 8.3–10.1)
CHLORIDE SERPL-SCNC: 106 MMOL/L (ref 96–108)
CHOLEST SERPL-MCNC: 146 MG/DL
CO2 SERPL-SCNC: 29 MMOL/L (ref 21–32)
CREAT SERPL-MCNC: 0.78 MG/DL (ref 0.6–1.3)
EOSINOPHIL # BLD AUTO: 0.14 THOUSAND/ÂΜL (ref 0–0.61)
EOSINOPHIL NFR BLD AUTO: 2 % (ref 0–6)
ERYTHROCYTE [DISTWIDTH] IN BLOOD BY AUTOMATED COUNT: 13.3 % (ref 11.6–15.1)
GFR SERPL CREATININE-BSD FRML MDRD: 71 ML/MIN/1.73SQ M
GLUCOSE P FAST SERPL-MCNC: 110 MG/DL (ref 65–99)
HCT VFR BLD AUTO: 38.1 % (ref 34.8–46.1)
HDLC SERPL-MCNC: 66 MG/DL
HGB BLD-MCNC: 12 G/DL (ref 11.5–15.4)
IMM GRANULOCYTES # BLD AUTO: 0.01 THOUSAND/UL (ref 0–0.2)
IMM GRANULOCYTES NFR BLD AUTO: 0 % (ref 0–2)
LDLC SERPL CALC-MCNC: 66 MG/DL (ref 0–100)
LYMPHOCYTES # BLD AUTO: 1.38 THOUSANDS/ÂΜL (ref 0.6–4.47)
LYMPHOCYTES NFR BLD AUTO: 24 % (ref 14–44)
MCH RBC QN AUTO: 28.9 PG (ref 26.8–34.3)
MCHC RBC AUTO-ENTMCNC: 31.5 G/DL (ref 31.4–37.4)
MCV RBC AUTO: 92 FL (ref 82–98)
MONOCYTES # BLD AUTO: 0.6 THOUSAND/ÂΜL (ref 0.17–1.22)
MONOCYTES NFR BLD AUTO: 10 % (ref 4–12)
NEUTROPHILS # BLD AUTO: 3.63 THOUSANDS/ÂΜL (ref 1.85–7.62)
NEUTS SEG NFR BLD AUTO: 62 % (ref 43–75)
NONHDLC SERPL-MCNC: 80 MG/DL
NRBC BLD AUTO-RTO: 0 /100 WBCS
PLATELET # BLD AUTO: 97 THOUSANDS/UL (ref 149–390)
PMV BLD AUTO: 12.6 FL (ref 8.9–12.7)
POTASSIUM SERPL-SCNC: 4.4 MMOL/L (ref 3.5–5.3)
PROT SERPL-MCNC: 6.8 G/DL (ref 6.4–8.4)
RBC # BLD AUTO: 4.15 MILLION/UL (ref 3.81–5.12)
SODIUM SERPL-SCNC: 138 MMOL/L (ref 135–147)
TRIGL SERPL-MCNC: 71 MG/DL
WBC # BLD AUTO: 5.82 THOUSAND/UL (ref 4.31–10.16)

## 2022-11-15 NOTE — PROGRESS NOTES
BMI Counseling: Body mass index is 28 15 kg/m²  The BMI is above normal  Nutrition recommendations include reducing portion sizes, decreasing overall calorie intake and 3-5 servings of fruits/vegetables daily  Exercise recommendations include exercising 3-5 times per week  Assessment and Plan:     Problem List Items Addressed This Visit    None          Preventive health issues were discussed with patient, and age appropriate screening tests were ordered as noted in patient's After Visit Summary  Personalized health advice and appropriate referrals for health education or preventive services given if needed, as noted in patient's After Visit Summary  History of Present Illness:     Patient presents for a Medicare Wellness Visit    HPI   Patient Care Team:  Yang Spring MD as PCP - General (Family Medicine)     Review of Systems:     Review of Systems     Problem List:     Patient Active Problem List   Diagnosis   • Malignant neoplasm of upper-outer quadrant of right breast in female, estrogen receptor positive ([address] )   • Essential hypertension   • Bradycardia   • Platelets decreased ([address] )   • Leg skin lesion, left   • Senile nuclear cataract   • POAG (primary open-angle glaucoma)   • History of basal cell cancer   • HCVD (hypertensive cardiovascular disease)   • Dyslipidemia, goal LDL below 130   • 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   • COVID-19      Past Medical and Surgical History:     Past Medical History:   Diagnosis Date   • Breast cancer ([address] )    • Hyperlipidemia, unspecified    • Hypertension      Past Surgical History:   Procedure Laterality Date   • BREAST LUMPECTOMY     • BREAST SURGERY     • CATARACT EXTRACTION     • EYE SURGERY     • HYSTERECTOMY        Family History:     No family history on file     Social History:     Social History     Socioeconomic History   • Marital status: /Civil Union     Spouse name: None   • Number of children: None   • Years of education: None   • Highest education level: None   Occupational History   • None   Tobacco Use   • Smoking status: Never Smoker   • Smokeless tobacco: Never Used   Substance and Sexual Activity   • Alcohol use: None   • Drug use: None   • Sexual activity: None   Other Topics Concern   • None   Social History Narrative   • None     Social Determinants of Health     Financial Resource Strain: Not on file   Food Insecurity: Not on file   Transportation Needs: Not on file   Physical Activity: Not on file   Stress: Not on file   Social Connections: Not on file   Intimate Partner Violence: Not on file   Housing Stability: Not on file      Medications and Allergies:     Current Outpatient Medications   Medication Sig Dispense Refill   • anastrozole (ARIMIDEX) 1 mg tablet Take 1 mg by mouth daily     • aspirin (ECOTRIN LOW STRENGTH) 81 mg EC tablet Take 81 mg by mouth     • atorvastatin (LIPITOR) 20 mg tablet Take 1 tablet (20 mg total) by mouth in the morning  90 tablet 3   • Calcium-Phosphorus-Vitamin D (Citracal +D3) 250-107-500 MG-MG-UNIT CHEW Chew     • hydrochlorothiazide (HYDRODIURIL) 12 5 mg tablet Take 1 tablet (12 5 mg total) by mouth in the morning  90 tablet 3   • Multiple Vitamin (multivitamin) capsule Take 1 capsule by mouth daily     • travoprost (TRAVATAN-Z) 0 004 % ophthalmic solution instill 1 drop into both eyes BEFORE BEDTIME     • aspirin (ECOTRIN LOW STRENGTH) 81 mg EC tablet Take 81 mg by mouth daily (Patient not taking: Reported on 11/15/2022)     • ramipril (ALTACE) 10 MG capsule Take 1 capsule (10 mg total) by mouth in the morning  90 capsule 3     No current facility-administered medications for this visit       Allergies   Allergen Reactions   • Brimonidine Other (See Comments)     Eye lashes fell off      Immunizations:     Immunization History   Administered Date(s) Administered   • COVID-19 MODERNA VACC 0 5 ML IM 01/11/2021, 02/08/2021, 09/18/2021, 04/18/2022   • INFLUENZA 10/28/2013, 10/03/2014, 11/16/2015, 10/31/2017, 10/17/2018   • Influenza Quadrivalent 3 years and older 10/28/2013, 10/03/2014, 11/16/2015, 10/31/2017, 10/17/2018   • Influenza Quadrivalent Preservative Free 3 years and older IM 12/09/2016   • Influenza, Seasonal Vaccine, Quadrivalent, Adjuvanted,  5e 09/30/2020   • Influenza, high dose seasonal 0 7 mL 09/30/2020, 09/18/2021   • Pneumococcal Conjugate 13-Valent 12/12/2019   • Pneumococcal Polysaccharide PPV23 10/29/2012   • Tdap 08/19/2014, 09/30/2020   • Zoster 10/17/2018   • Zoster Vaccine Recombinant 07/20/2018, 10/17/2018, 03/13/2019   • influenza, trivalent, adjuvanted 10/17/2018, 12/12/2019      Health Maintenance:         Topic Date Due   • DXA SCAN  10/12/2024         Topic Date Due   • Influenza Vaccine (1) 09/01/2022      Medicare Screening Tests and Risk Assessments:     Esther Gallego is here for her Subsequent Wellness visit  Health Risk Assessment:   Patient rates overall health as very good  Patient feels that their physical health rating is same  Patient is very satisfied with their life  Eyesight was rated as same  Hearing was rated as same  Patient feels that their emotional and mental health rating is same  Patients states they are never, rarely angry  Patient states they are never, rarely unusually tired/fatigued  Pain experienced in the last 7 days has been none  Patient states that she has experienced no weight loss or gain in last 6 months  Depression Screening:   PHQ-2 Score: 0      Fall Risk Screening: In the past year, patient has experienced: no history of falling in past year      Urinary Incontinence Screening:   Patient has not leaked urine accidently in the last six months  Home Safety:  Patient does not have trouble with stairs inside or outside of their home  Patient has working smoke alarms and has working carbon monoxide detector  Home safety hazards include: none  Nutrition:   Current diet is Regular       Medications:   Patient is not currently taking any over-the-counter supplements  Patient is able to manage medications  Activities of Daily Living (ADLs)/Instrumental Activities of Daily Living (IADLs):   Walk and transfer into and out of bed and chair?: Yes  Dress and groom yourself?: Yes    Bathe or shower yourself?: Yes    Feed yourself?  Yes  Do your laundry/housekeeping?: Yes  Manage your money, pay your bills and track your expenses?: Yes  Make your own meals?: Yes    Do your own shopping?: Yes    Previous Hospitalizations:   Any hospitalizations or ED visits within the last 12 months?: No      Advance Care Planning:     Five wishes given: No      PREVENTIVE SCREENINGS      Cardiovascular Screening:    General: History Lipid Disorder and Screening Current      Diabetes Screening:     General: Screening Current      Colorectal Cancer Screening:     General: Screening Not Indicated      Breast Cancer Screening:     General: History Breast Cancer and Screening Not Indicated      Cervical Cancer Screening:    General: Screening Not Indicated      Osteoporosis Screening:    General: Screening Current      Lung Cancer Screening:     General: Screening Not Indicated      Hepatitis C Screening:    General: Screening Not Indicated    Screening, Brief Intervention, and Referral to Treatment (SBIRT)    Screening  Typical number of drinks in a day: 0    Single Item Drug Screening:  How often have you used an illegal drug (including marijuana) or a prescription medication for non-medical reasons in the past year? never    Single Item Drug Screen Score: 0  Interpretation: Negative screen for possible drug use disorder    No exam data present     Physical Exam:     /78 (BP Location: Left arm, Patient Position: Sitting)   Pulse 64   Temp 97 8 °F (36 6 °C) (Temporal)   Ht 5' 4" (1 626 m)   Wt 74 4 kg (164 lb)   SpO2 99%   BMI 28 15 kg/m²     Physical Exam     Kim Ruiz MD

## 2022-11-15 NOTE — PROGRESS NOTES
Name: Fang Pagan      : 1940      MRN: [id number]  Encounter Provider: Rochelle Luna MD  Encounter Date: 11/15/2022   Encounter department: [facility] [address]     1  Bradycardia  No symptoms  She stopped beta blocker  HR 64 today    2  Platelets decreased (HCC)  -     CBC and differential; Future    3  Impaired fasting glucose  -     Comprehensive metabolic panel; Future  -     Hemoglobin A1C; Future    4  Medicare annual wellness visit, subsequent  See Medicare wellness note    Follow up in 1 year or as needed         Subjective     Patient is here because she had bradycardia in the past denies any symptoms related to this  Had an echo done back in august which was normal     Review of Systems   Constitutional: Negative for activity change, appetite change, fatigue and fever  HENT: Negative for congestion and ear discharge  Respiratory: Negative for cough and shortness of breath  Cardiovascular: Negative for chest pain and palpitations  Gastrointestinal: Negative for diarrhea and nausea  Musculoskeletal: Negative for arthralgias and back pain  Skin: Negative for color change and rash  Neurological: Negative for dizziness and headaches  Psychiatric/Behavioral: Negative for agitation and behavioral problems  Past Medical History:   Diagnosis Date   • Breast cancer ([facility] [address] )    • Hyperlipidemia, unspecified    • Hypertension      Past Surgical History:   Procedure Laterality Date   • BREAST LUMPECTOMY     • BREAST SURGERY     • CATARACT EXTRACTION     • EYE SURGERY     • HYSTERECTOMY       No family history on file    Social History     Socioeconomic History   • Marital status: /Civil Union     Spouse name: None   • Number of children: None   • Years of education: None   • Highest education level: None   Occupational History   • None   Tobacco Use   • Smoking status: Never Smoker   • Smokeless tobacco: Never Used   Substance and Sexual Activity   • Alcohol use: None   • Drug use: None   • Sexual activity: None   Other Topics Concern   • None   Social History Narrative   • None     Social Determinants of Health     Financial Resource Strain: Low Risk    • Difficulty of Paying Living Expenses: Not hard at all   Food Insecurity: Not on file   Transportation Needs: No Transportation Needs   • Lack of Transportation (Medical): No   • Lack of Transportation (Non-Medical): No   Physical Activity: Not on file   Stress: Not on file   Social Connections: Not on file   Intimate Partner Violence: Not on file   Housing Stability: Not on file     Current Outpatient Medications on File Prior to Visit   Medication Sig   • anastrozole (ARIMIDEX) 1 mg tablet Take 1 mg by mouth daily   • aspirin (ECOTRIN LOW STRENGTH) 81 mg EC tablet Take 81 mg by mouth   • atorvastatin (LIPITOR) 20 mg tablet Take 1 tablet (20 mg total) by mouth in the morning  • Calcium-Phosphorus-Vitamin D (Citracal +D3) 250-107-500 MG-MG-UNIT CHEW Chew   • hydrochlorothiazide (HYDRODIURIL) 12 5 mg tablet Take 1 tablet (12 5 mg total) by mouth in the morning  • Multiple Vitamin (multivitamin) capsule Take 1 capsule by mouth daily   • travoprost (TRAVATAN-Z) 0 004 % ophthalmic solution instill 1 drop into both eyes BEFORE BEDTIME   • aspirin (ECOTRIN LOW STRENGTH) 81 mg EC tablet Take 81 mg by mouth daily (Patient not taking: Reported on 11/15/2022)   • ramipril (ALTACE) 10 MG capsule Take 1 capsule (10 mg total) by mouth in the morning       Allergies   Allergen Reactions   • Brimonidine Other (See Comments)     Eye lashes fell off     Immunization History   Administered Date(s) Administered   • COVID-19 MODERNA VACC 0 5 ML IM 01/11/2021, 02/08/2021, 09/18/2021, 04/18/2022   • INFLUENZA 10/28/2013, 10/03/2014, 11/16/2015, 10/31/2017, 10/17/2018, 10/14/2022   • Influenza Quadrivalent 3 years and older 10/28/2013, 10/03/2014, 11/16/2015, 10/31/2017, 10/17/2018   • Influenza Quadrivalent Preservative Free 3 years and older IM 12/09/2016   • Influenza, Seasonal Vaccine, Quadrivalent, Adjuvanted,  5e 09/30/2020   • Influenza, high dose seasonal 0 7 mL 09/30/2020, 09/18/2021   • Pneumococcal Conjugate 13-Valent 12/12/2019   • Pneumococcal Polysaccharide PPV23 10/29/2012   • Tdap 08/19/2014, 09/30/2020   • Zoster 10/17/2018   • Zoster Vaccine Recombinant 07/20/2018, 10/17/2018, 03/13/2019   • influenza, trivalent, adjuvanted 10/17/2018, 12/12/2019       Objective     /78 (BP Location: Left arm, Patient Position: Sitting)   Pulse 64   Temp 97 8 °F (36 6 °C) (Temporal)   Ht 5' 4" (1 626 m)   Wt 74 4 kg (164 lb)   SpO2 99%   BMI 28 15 kg/m²     Physical Exam  Constitutional:       General: She is not in acute distress  Appearance: She is well-developed  She is not diaphoretic  HENT:      Head: Normocephalic and atraumatic  Nose: Nose normal    Eyes:      Conjunctiva/sclera: Conjunctivae normal       Pupils: Pupils are equal, round, and reactive to light  Cardiovascular:      Rate and Rhythm: Normal rate and regular rhythm  Heart sounds: Normal heart sounds  No murmur heard  Pulmonary:      Effort: Pulmonary effort is normal  No respiratory distress  Breath sounds: Normal breath sounds  No wheezing  Abdominal:      General: Bowel sounds are normal  There is no distension  Palpations: Abdomen is soft  Tenderness: There is no abdominal tenderness  Skin:     General: Skin is warm and dry  Findings: No erythema or rash  Neurological:      Mental Status: She is alert and oriented to person, place, and time         Stephen Chaudhari MD

## 2022-11-16 LAB
BACTERIA UR CULT: NORMAL
EST. AVERAGE GLUCOSE BLD GHB EST-MCNC: 111 MG/DL
HBA1C MFR BLD: 5.5 %

## 2023-01-14 PROBLEM — Z00.00 MEDICARE ANNUAL WELLNESS VISIT, SUBSEQUENT: Status: RESOLVED | Noted: 2022-11-15 | Resolved: 2023-01-14

## 2023-03-07 ENCOUNTER — EVALUATION (OUTPATIENT)
Dept: PHYSICAL THERAPY | Facility: CLINIC | Age: 83
End: 2023-03-07

## 2023-03-07 DIAGNOSIS — M75.121 COMPLETE TEAR OF RIGHT ROTATOR CUFF, UNSPECIFIED WHETHER TRAUMATIC: Primary | ICD-10-CM

## 2023-03-07 DIAGNOSIS — M25.511 CHRONIC RIGHT SHOULDER PAIN: ICD-10-CM

## 2023-03-07 DIAGNOSIS — G89.29 CHRONIC RIGHT SHOULDER PAIN: ICD-10-CM

## 2023-03-07 NOTE — LETTER
2023    MD Elmer Pinedo [id number]    Patient: Baylee Cazares   YOB: 1940   Date of Visit: 3/7/2023   No diagnosis found  Dear Dr Yossi Pablo:    Thank you for your recent referral of Baylee Cazares  Please review the attached evaluation summary from Lori's recent visit  Please verify that you agree with the plan of care by signing the attached order  If you have any questions or concerns, please do not hesitate to call  I sincerely appreciate the opportunity to share in the care of one of your patients and hope to have another opportunity to work with you in the near future  Sincerely,    Janell Reyes, PT      Referring Provider:      I certify that I have read the below Plan of Care and certify the need for these services furnished under this plan of treatment while under my care  MD Elmer Pinedo [id number]  Via Fax: [phone]          PT Evaluation     Today's date: 3/7/2023  Patient name: Baylee Cazares  : 1940  MRN: [id number]  Referring provider: Kevin Mueller MD  Dx:   Encounter Diagnosis     ICD-10-CM    1  Complete tear of right rotator cuff, unspecified whether traumatic  M75 121       2  Chronic right shoulder pain  M25 511     G89 29           Start Time: 1032  Stop Time: 1120  Total time in clinic (min): 48 minutes    Assessment  Assessment details: Pt presents with chronic right shoulder pain and progressive loss of function  Presently reports she is unable to elevate UE without assist from LUE  Reports constant pain  Right hand dominant  Reports all ADL's limited  PT notes poor strength and significant limitations in ROM  Recent Ortho MD consult and reports MD feels she has RTC tear  No MRI performed  Pt will benefit from PT tx to decrease symptoms and improve functional level      Impairments: abnormal or restricted ROM, activity intolerance, impaired physical strength, lacks appropriate home exercise program and pain with function     Prognosis: fair    Goals  ST  Decrease pain 25% 6 wk  2  Increase shoulder AROM by 30-40 degrees 6 wk  3  Increase shoulder strength to 3+/5 6 wk  4  Pt will report no difficulty with activity below shoulder level 6 wk    LT  Pt will report 50% decrease in pain 12 wk  2  Increase shoulder AROM to [facility] 12 wk  3  Increase shoulder strength to [facility] 12 wk  4  Pt will report no limitations with ADL's 12 wk    Plan  Patient would benefit from: PT eval and skilled physical therapy  Planned modality interventions: cryotherapy and thermotherapy: hydrocollator packs  Planned therapy interventions: joint mobilization, manual therapy, neuromuscular re-education, strengthening, stretching, therapeutic activities, therapeutic exercise, flexibility, functional ROM exercises and home exercise program  Frequency: 3x week  Duration in weeks: 12  Treatment plan discussed with: patient        Subjective Evaluation    History of Present Illness  Mechanism of injury: Pt presents with right shoulder pain  Reports Ortho MD diagnosed her with right RTC tear  Unable to elevate RUE without assist from LUE  Inability to elevate RUE for the past 6 months  Pain intermittent in shoulder but constant pain in upper arm between shoulder and elbow  Reports intermittent altered sensation right thumb/wrist   Reports crepitus in shoulder  No MRI to date  Had one injection which did not help symptoms      Pain  Current pain ratin  At best pain ratin  At worst pain ratin  Location: Right shoulder/upper arm  Quality: sharp  Relieving factors: rest  Aggravating factors: lifting    Hand dominance: right    Patient Goals  Patient goals for therapy: decreased pain, increased motion, increased strength and independence with ADLs/IADLs          Objective     Postural Observations    Additional Postural Observation Details  Forward head  Forward rounded shoulders    Tenderness     Right Shoulder  Tenderness in the acromion, biceps tendon (proximal), infraspinatus tendon and supraspinatus tendon  Additional Tenderness Details  TTP deltoid tuberosity region    Active Range of Motion     Right Shoulder   Flexion: 62 degrees with pain  Abduction: 50 degrees with pain  Internal rotation BTB: L4 with pain    Additional Active Range of Motion Details  Unable to reach to head for ER without assist of LUE    Passive Range of Motion     Right Shoulder   Flexion: 145 degrees with pain  Abduction: 120 degrees with pain  External rotation 90°: 40 degrees with pain  Internal rotation 90°: 45 degrees with pain    Strength/Myotome Testing     Left Shoulder     Isolated Muscles   Upper trapezius: 4     Right Shoulder     Isolated Muscles   Biceps: 3+   Triceps: 3   Upper trapezius: 4     Additional Strength Details  Fair- isometric strength Flex, Ext, Add, IR  Poor isometric strength Abd, ER  (sharp pain)    Tests     Right Shoulder   Positive drop arm and empty can  Flowsheet Rows    Flowsheet Row Most Recent Value   PT/OT G-Codes    Current Score 32   Projected Score 55            Precautions:  Hx Breast Cancer    NO E-STIM OR ULTRASOUND       Manuals 3-7-23       PROM - Mobs right shoulder                                Neuro Re-Ed         LTP/MTP        T-band ER/IR        T-band Add        Shld Isometrics                                Ther Ex        UBE        nustep        pulleys        Bicep curl        Bent over row        Finger ladder        Wall slide                                HEP Instructed and issued HEP       Ther Activity                        Gait Training                        Modalities        CP

## 2023-03-07 NOTE — PROGRESS NOTES
PT Evaluation     Today's date: 3/7/2023  Patient name: Blade Jansen  : 1940  MRN: [id number]  Referring provider: Segundo Garcia MD  Dx:   Encounter Diagnosis     ICD-10-CM    1  Complete tear of right rotator cuff, unspecified whether traumatic  M75 121       2  Chronic right shoulder pain  M25 511     G89 29           Start Time: 1032  Stop Time: 1120  Total time in clinic (min): 48 minutes    Assessment  Assessment details: Pt presents with chronic right shoulder pain and progressive loss of function  Presently reports she is unable to elevate UE without assist from LUE  Reports constant pain  Right hand dominant  Reports all ADL's limited  PT notes poor strength and significant limitations in ROM  Recent Ortho MD consult and reports MD feels she has RTC tear  No MRI performed  Pt will benefit from PT tx to decrease symptoms and improve functional level  Impairments: abnormal or restricted ROM, activity intolerance, impaired physical strength, lacks appropriate home exercise program and pain with function     Prognosis: fair    Goals  ST  Decrease pain 25% 6 wk  2  Increase shoulder AROM by 30-40 degrees 6 wk  3  Increase shoulder strength to 3+/5 6 wk  4  Pt will report no difficulty with activity below shoulder level 6 wk    LT  Pt will report 50% decrease in pain 12 wk  2  Increase shoulder AROM to [facility] 12 wk  3  Increase shoulder strength to [facility] 12 wk  4    Pt will report no limitations with ADL's 12 wk    Plan  Patient would benefit from: PT eval and skilled physical therapy  Planned modality interventions: cryotherapy and thermotherapy: hydrocollator packs  Planned therapy interventions: joint mobilization, manual therapy, neuromuscular re-education, strengthening, stretching, therapeutic activities, therapeutic exercise, flexibility, functional ROM exercises and home exercise program  Frequency: 3x week  Duration in weeks: 12  Treatment plan discussed with: patient        Subjective Evaluation    History of Present Illness  Mechanism of injury: Pt presents with right shoulder pain  Reports Ortho MD diagnosed her with right RTC tear  Unable to elevate RUE without assist from LUE  Inability to elevate RUE for the past 6 months  Pain intermittent in shoulder but constant pain in upper arm between shoulder and elbow  Reports intermittent altered sensation right thumb/wrist   Reports crepitus in shoulder  No MRI to date  Had one injection which did not help symptoms  Pain  Current pain ratin  At best pain ratin  At worst pain ratin  Location: Right shoulder/upper arm  Quality: sharp  Relieving factors: rest  Aggravating factors: lifting    Hand dominance: right    Patient Goals  Patient goals for therapy: decreased pain, increased motion, increased strength and independence with ADLs/IADLs          Objective     Postural Observations    Additional Postural Observation Details  Forward head  Forward rounded shoulders    Tenderness     Right Shoulder  Tenderness in the acromion, biceps tendon (proximal), infraspinatus tendon and supraspinatus tendon       Additional Tenderness Details  TTP deltoid tuberosity region    Active Range of Motion     Right Shoulder   Flexion: 62 degrees with pain  Abduction: 50 degrees with pain  Internal rotation BTB: L4 with pain    Additional Active Range of Motion Details  Unable to reach to head for ER without assist of LUE    Passive Range of Motion     Right Shoulder   Flexion: 145 degrees with pain  Abduction: 120 degrees with pain  External rotation 90°: 40 degrees with pain  Internal rotation 90°: 45 degrees with pain    Strength/Myotome Testing     Left Shoulder     Isolated Muscles   Upper trapezius: 4     Right Shoulder     Isolated Muscles   Biceps: 3+   Triceps: 3   Upper trapezius: 4     Additional Strength Details  Fair- isometric strength Flex, Ext, Add, IR  Poor isometric strength Abd, ER  (sharp pain)    Tests     Right Shoulder   Positive drop arm and empty can  Flowsheet Rows    Flowsheet Row Most Recent Value   PT/OT G-Codes    Current Score 32   Projected Score 55             Precautions:  Hx Breast Cancer    NO E-STIM OR ULTRASOUND       Manuals 3-7-23       PROM - Mobs right shoulder                                Neuro Re-Ed         LTP/MTP        T-band ER/IR        T-band Add        Shld Isometrics                                Ther Ex        UBE        nustep        pulleys        Bicep curl        Bent over row        Finger ladder        Wall slide                                HEP Instructed and issued HEP       Ther Activity                        Gait Training                        Modalities        CP

## 2023-03-09 ENCOUNTER — OFFICE VISIT (OUTPATIENT)
Dept: PHYSICAL THERAPY | Facility: CLINIC | Age: 83
End: 2023-03-09

## 2023-03-09 DIAGNOSIS — G89.29 CHRONIC RIGHT SHOULDER PAIN: ICD-10-CM

## 2023-03-09 DIAGNOSIS — M25.511 CHRONIC RIGHT SHOULDER PAIN: ICD-10-CM

## 2023-03-09 DIAGNOSIS — M75.121 COMPLETE TEAR OF RIGHT ROTATOR CUFF, UNSPECIFIED WHETHER TRAUMATIC: Primary | ICD-10-CM

## 2023-03-09 NOTE — PROGRESS NOTES
Daily Note     Today's date: 3/9/2023  Patient name: Kelly Pulido  : 1940  MRN: [id number]  Referring provider: Kaya Bull MD  Dx:   Encounter Diagnosis     ICD-10-CM    1  Complete tear of right rotator cuff, unspecified whether traumatic  M75 121       2  Chronic right shoulder pain  M25 511     G89 29                      Subjective: Pt reports being able to perform most of her home exercises well with the exception of (D2 and supine cane flexion)  Objective: See treatment diary below      Assessment: Pt did well with all activities but struggles with active ER  Plan: Continue per plan of care  Precautions:  Hx Breast Cancer    NO E-STIM OR ULTRASOUND       Manuals 3-7-23 3-9-23      PROM - Mobs right shoulder  15'                              Neuro Re-Ed         LTP/MTP  Red 2x10 each      T-band ER/IR  Yellow ER/ Red IR 2x10 each      T-band Add  Red 2x10      Shld Isometrics  Ab and flex  Sub max  10" 10x each                              Ther Ex        UBE  ==>      nustep  L2 10'      pulleys  12x5"      Bicep curl  2# 2x10      Bent over row  2# 2x10      Finger ladder  ==>      Wall slide  ==>                              HEP Instructed and issued HEP Reviewed troublesome activities 10'      Ther Activity                        Gait Training                        Modalities        CP

## 2023-03-13 ENCOUNTER — OFFICE VISIT (OUTPATIENT)
Dept: PHYSICAL THERAPY | Facility: CLINIC | Age: 83
End: 2023-03-13

## 2023-03-13 DIAGNOSIS — M25.511 CHRONIC RIGHT SHOULDER PAIN: ICD-10-CM

## 2023-03-13 DIAGNOSIS — M75.121 COMPLETE TEAR OF RIGHT ROTATOR CUFF, UNSPECIFIED WHETHER TRAUMATIC: Primary | ICD-10-CM

## 2023-03-13 DIAGNOSIS — G89.29 CHRONIC RIGHT SHOULDER PAIN: ICD-10-CM

## 2023-03-13 NOTE — PROGRESS NOTES
Daily Note     Today's date: 3/13/2023  Patient name: Lynn Savage  : 1940  MRN: [id number]  Referring provider: Kely Malik MD  Dx:   Encounter Diagnosis     ICD-10-CM    1  Complete tear of right rotator cuff, unspecified whether traumatic  M75 121       2  Chronic right shoulder pain  M25 511     G89 29                      Subjective: The arm felt good after the last session      Objective: See treatment diary below       Assessment: Tolerated treatment well  Reports continued weakness and limited ROM  Demonstrates most weakness with ER  Patient would benefit from continued PT      Plan: Continue per plan of care  Precautions:  Hx Breast Cancer    NO E-STIM OR ULTRASOUND       Manuals 3-7-23 3-9-23 3-13-23     PROM - Mobs right shoulder  15' 10'                             Neuro Re-Ed         LTP/MTP  Red 2x10 each Red 3x10 ea     T-band ER/IR  Yellow ER/ Red IR 2x10 each ER yellow 20x IR red 30x     T-band Add  Red 2x10 Red 3x10     Shld Isometrics  Ab and flex  Sub max  10" 10x each                              Ther Ex        UBE  ==> 8'       nustep  L2 10' L3  12'     pulleys  12x5" 30x  5"     Bicep curl  2# 2x10 2#  30x     Bent over row  2# 2x10 2#  30x     Finger ladder  ==> 10x Fwd     Wall slide  ==>                              HEP Instructed and issued HEP Reviewed troublesome activities 10'      Ther Activity                        Gait Training                        Modalities        CP   5'

## 2023-03-14 ENCOUNTER — APPOINTMENT (OUTPATIENT)
Dept: PHYSICAL THERAPY | Facility: CLINIC | Age: 83
End: 2023-03-14

## 2023-03-15 NOTE — PROGRESS NOTES
Daily Note     Today's date: 3/16/2023  Patient name: Talia Rowe  : 1940  MRN: [id number]  Referring provider: Guerda Lebron MD  Dx:   Encounter Diagnosis     ICD-10-CM    1  Complete tear of right rotator cuff, unspecified whether traumatic  M75 121       2  Chronic right shoulder pain  M25 511     G89 29                      Subjective: The shoulder was good after the last session  It gets sore at home when I do the exercises with the cane lifting the arm  Objective: See treatment diary below      Assessment: Tolerated treatment well  Increased pain noted with pulleys compared to prior session  To hold cane elevation exercises with HEP and replace with wall walk as no pain noted with finger ladder at PT  Tx modified due to pt time constraint Patient would benefit from continued PT      Plan: Continue per plan of care  Precautions:  Hx Breast Cancer    NO E-STIM OR ULTRASOUND       Manuals 3-7-23 3-9-23 3-13-23 3-16-23    PROM - Mobs right shoulder  15' 10' 10'                            Neuro Re-Ed         LTP/MTP  Red 2x10 each Red 3x10 ea Red 20x ea    T-band ER/IR  Yellow ER/ Red IR 2x10 each ER yellow 20x IR red 30x ER yellow 20x w/ AAROM  IR red 20x    T-band Add  Red 2x10 Red 3x10 Red 20x    Shld Isometrics  Ab and flex  Sub max  10" 10x each                              Ther Ex        UBE  ==> 8'   8'    nustep  L2 10' L3  12' L3  12'    pulleys  12x5" 30x  5" 20x  (pain)    Bicep curl  2# 2x10 2#  30x held    Bent over row  2# 2x10 2#  30x held    Finger ladder  ==> 10x Fwd 10x  Fwd    Wall slide  ==>                              HEP Instructed and issued HEP Reviewed troublesome activities 10'      Ther Activity                        Gait Training                        Modalities        CP   5' Declined

## 2023-03-16 ENCOUNTER — OFFICE VISIT (OUTPATIENT)
Dept: PHYSICAL THERAPY | Facility: CLINIC | Age: 83
End: 2023-03-16

## 2023-03-16 DIAGNOSIS — M25.511 CHRONIC RIGHT SHOULDER PAIN: ICD-10-CM

## 2023-03-16 DIAGNOSIS — M75.121 COMPLETE TEAR OF RIGHT ROTATOR CUFF, UNSPECIFIED WHETHER TRAUMATIC: Primary | ICD-10-CM

## 2023-03-16 DIAGNOSIS — G89.29 CHRONIC RIGHT SHOULDER PAIN: ICD-10-CM

## 2023-03-21 ENCOUNTER — OFFICE VISIT (OUTPATIENT)
Dept: PHYSICAL THERAPY | Facility: CLINIC | Age: 83
End: 2023-03-21

## 2023-03-21 DIAGNOSIS — G89.29 CHRONIC RIGHT SHOULDER PAIN: ICD-10-CM

## 2023-03-21 DIAGNOSIS — M25.511 CHRONIC RIGHT SHOULDER PAIN: ICD-10-CM

## 2023-03-21 DIAGNOSIS — M75.121 COMPLETE TEAR OF RIGHT ROTATOR CUFF, UNSPECIFIED WHETHER TRAUMATIC: Primary | ICD-10-CM

## 2023-03-21 NOTE — PROGRESS NOTES
Daily Note     Today's date: 3/21/2023  Patient name: Malathi Lan  : 1940  MRN: [id number]  Referring provider: Sherrell Campbell MD  Dx:   Encounter Diagnosis     ICD-10-CM    1  Complete tear of right rotator cuff, unspecified whether traumatic  M75 121       2  Chronic right shoulder pain  M25 511     G89 29                      Subjective:   I found a way to do the dowel exercises at home without to much pain  I did the wall walk also      Objective: See treatment diary below      Assessment: Tolerated treatment well  No increase in symptoms today with tx  Reports continued weakness with elevation of UE and lifting/reaching for objects  Patient would benefit from continued PT      Plan: Continue per plan of care  Precautions:  Hx Breast Cancer    NO E-STIM OR ULTRASOUND       Manuals 3-7-23 3-9-23 3-13-23 3-16-23 3-21-23   PROM - Mobs right shoulder  15' 10' 10' 10'                           Neuro Re-Ed         LTP/MTP  Red 2x10 each Red 3x10 ea Red 20x ea Red 30x   T-band ER/IR  Yellow ER/ Red IR 2x10 each ER yellow 20x IR red 30x ER yellow 20x w/ AAROM  IR red 20x ER yellow 20x w/ AAROM   IR red 30x   T-band Add  Red 2x10 Red 3x10 Red 20x Red 30x   Shld Isometrics  Ab and flex  Sub max  10" 10x each                              Ther Ex        UBE  ==> 8'   8' 8'     nustep  L2 10' L3  12' L3  12' L5  12'   pulleys  12x5" 30x  5" 20x  (pain) 30x   Bicep curl  2# 2x10 2#  30x held 2#  20x   Bent over row  2# 2x10 2#  30x held 2#   Finger ladder  ==> 10x Fwd 10x  Fwd 10x  Fwd   Wall slide  ==>                              HEP Instructed and issued HEP Reviewed troublesome activities 10'      Ther Activity                        Gait Training                        Modalities        CP   5' Declined  5'

## 2023-03-24 ENCOUNTER — OFFICE VISIT (OUTPATIENT)
Dept: PHYSICAL THERAPY | Facility: CLINIC | Age: 83
End: 2023-03-24

## 2023-03-24 DIAGNOSIS — M75.121 COMPLETE TEAR OF RIGHT ROTATOR CUFF, UNSPECIFIED WHETHER TRAUMATIC: Primary | ICD-10-CM

## 2023-03-24 DIAGNOSIS — G89.29 CHRONIC RIGHT SHOULDER PAIN: ICD-10-CM

## 2023-03-24 DIAGNOSIS — M25.511 CHRONIC RIGHT SHOULDER PAIN: ICD-10-CM

## 2023-03-24 NOTE — PROGRESS NOTES
Daily Note     Today's date: 3/24/2023  Patient name: Malathi Lan  : 1940  MRN: [id number]  Referring provider: Sherrell Campbell MD  Dx:   Encounter Diagnosis     ICD-10-CM    1  Complete tear of right rotator cuff, unspecified whether traumatic  M75 121       2  Chronic right shoulder pain  M25 511     G89 29                      Subjective: Pt reports her R shoulder is bothering her today to lateral aspect of deltoid  Objective: See treatment diary below      Assessment: Visual and VC to ensure correct exercise technique and ensure posterior tilt of R scap  PROM to tolerance with decreased motion in abduction/flexion  Trialed 3# DB for bicep curls/rows but was unable due to pain but was able to complete w/o pain with 2#  Progress as able  Plan: Continue with current POC to address pt deficits  Precautions:  Hx Breast Cancer    NO E-STIM OR ULTRASOUND       Manuals 3/24/23 3-9-23 3-13-23 3-16-23 3-21-23   PROM - Mobs right shoulder 10' PROM only  15' 10' 10' 10'                           Neuro Re-Ed         LTP/MTP Red x30 ea Red 2x10 each Red 3x10 ea Red 20x ea Red 30x   T-band ER/IR ER yel x20 w/AAROM IR red x30 Yellow ER/ Red IR 2x10 each ER yellow 20x IR red 30x ER yellow 20x w/ AAROM  IR red 20x ER yellow 20x w/ AAROM   IR red 30x   T-band Add Red x30 Red 2x10 Red 3x10 Red 20x Red 30x   Shld Isometrics  Ab and flex  Sub max  10" 10x each                              Ther Ex        UBE 8' alt  ==> 8'   8' 8'     nustep L5 12' L2 10' L3  12' L3  12' L5  12'   pulleys 30x 12x5" 30x  5" 20x  (pain) 30x   Bicep curl 2# 20x 2# 2x10 2#  30x held 2#  20x   Bent over row 2# 20x 2# 2x10 2#  30x held 2#   Finger ladder 10 fwd  ==> 10x Fwd 10x  Fwd 10x  Fwd   Wall slide  ==>                              HEP  Reviewed troublesome activities 10'      Ther Activity                        Gait Training                        Modalities        CP 5'   5' Declined  5'

## 2023-03-24 NOTE — PROGRESS NOTES
"Daily Note     Today's date: 3/24/2023  Patient name: Rajeev Loya  : 1940  MRN: [id number]  Referring provider: Kinjal Kapoor MD  Dx: No diagnosis found  Subjective: Pt reports she had guests over throughout the weekend and carried her friends bag with her right arm  She reports increased pain today with lifting activities on her right arm  Objective: See treatment diary below      Assessment: Tolerated treatment fair  Patient reported an increase in pain with activities involving flexion of the RUE above 90 degrees  Finger ladder and pulleys performed in a modified range of motion  Verbal and manual cues required for proper form during ER exercise at the web slide  Patient would benefit from continued PT      Plan: Progress treatment as tolerated  Precautions:  Hx Breast Cancer    NO E-STIM OR ULTRASOUND       Manuals 3/27/23 3-9-23 3-13-23 3-16-23 3-21-23   PROM - Mobs right shoulder 10' PROM only  15' 10' 10' 10'                           Neuro Re-Ed         LTP/MTP Red x30 ea Red 2x10 each Red 3x10 ea Red 20x ea Red 30x   T-band ER/IR ER yel x20 w/AAROM IR red x30 Yellow ER/ Red IR 2x10 each ER yellow 20x IR red 30x ER yellow 20x w/ AAROM  IR red 20x ER yellow 20x w/ AAROM   IR red 30x   T-band Add Red x30 Red 2x10 Red 3x10 Red 20x Red 30x   Shld Isometrics  Ab and flex  Sub max  10\" 10x each                              Ther Ex        UBE 8' alt  ==> 8'   8' 8'     nustep L5 12' L2 10' L3  12' L3  12' L5  12'   pulleys 30x 12x5\" 30x  5\" 20x  (pain) 30x   Bicep curl 2# 20x fadumo 2# 2x10 2#  30x held 2#  20x   Bent over row 2# 20x fadumo 2# 2x10 2#  30x held 2#   Finger ladder 10x fwd  ==> 10x Fwd 10x  Fwd 10x  Fwd   Wall slide  ==>                              HEP  Reviewed troublesome activities 10'      Ther Activity                        Gait Training                        Modalities        CP 10'   5' Declined  5'                "

## 2023-03-27 ENCOUNTER — OFFICE VISIT (OUTPATIENT)
Dept: PHYSICAL THERAPY | Facility: CLINIC | Age: 83
End: 2023-03-27

## 2023-03-27 DIAGNOSIS — M25.511 CHRONIC RIGHT SHOULDER PAIN: Primary | ICD-10-CM

## 2023-03-27 DIAGNOSIS — M75.121 COMPLETE TEAR OF RIGHT ROTATOR CUFF, UNSPECIFIED WHETHER TRAUMATIC: ICD-10-CM

## 2023-03-27 DIAGNOSIS — G89.29 CHRONIC RIGHT SHOULDER PAIN: Primary | ICD-10-CM

## 2023-03-28 ENCOUNTER — HOSPITAL ENCOUNTER (EMERGENCY)
Facility: HOSPITAL | Age: 83
Discharge: HOME | End: 2023-03-28
Attending: EMERGENCY MEDICINE

## 2023-03-28 ENCOUNTER — APPOINTMENT (OUTPATIENT)
Dept: RADIOLOGY | Facility: CLINIC | Age: 83
End: 2023-03-28

## 2023-03-28 ENCOUNTER — APPOINTMENT (EMERGENCY)
Dept: VASCULAR ULTRASOUND | Facility: HOSPITAL | Age: 83
End: 2023-03-28

## 2023-03-28 ENCOUNTER — OFFICE VISIT (OUTPATIENT)
Dept: URGENT CARE | Facility: CLINIC | Age: 83
End: 2023-03-28

## 2023-03-28 VITALS
TEMPERATURE: 98 F | DIASTOLIC BLOOD PRESSURE: 68 MMHG | HEART RATE: 57 BPM | SYSTOLIC BLOOD PRESSURE: 183 MMHG | RESPIRATION RATE: 16 BRPM | OXYGEN SATURATION: 99 %

## 2023-03-28 VITALS
RESPIRATION RATE: 16 BRPM | SYSTOLIC BLOOD PRESSURE: 186 MMHG | HEART RATE: 66 BPM | TEMPERATURE: 98 F | OXYGEN SATURATION: 99 % | DIASTOLIC BLOOD PRESSURE: 77 MMHG

## 2023-03-28 DIAGNOSIS — M79.89 LEG SWELLING: Primary | ICD-10-CM

## 2023-03-28 DIAGNOSIS — S92.215A CLOSED NONDISPLACED FRACTURE OF CUBOID OF LEFT FOOT, INITIAL ENCOUNTER: ICD-10-CM

## 2023-03-28 DIAGNOSIS — S99.922A FOOT INJURY, LEFT, INITIAL ENCOUNTER: ICD-10-CM

## 2023-03-28 DIAGNOSIS — S99.922A FOOT INJURY, LEFT, INITIAL ENCOUNTER: Primary | ICD-10-CM

## 2023-03-28 DIAGNOSIS — R60.0 EDEMA OF LEFT LOWER EXTREMITY: ICD-10-CM

## 2023-03-28 DIAGNOSIS — M79.605 LOWER EXTREMITY PAIN, ANTERIOR, LEFT: ICD-10-CM

## 2023-03-28 NOTE — PROGRESS NOTES
[facility]        NAME: Pj Bourne is a 81 y o  female  : 1940    MRN: [id number]  DATE: 2023  TIME: 2:41 PM    Assessment and Plan   Foot injury, left, initial encounter [D12 926I]  1  Foot injury, left, initial encounter  XR foot 3+ vw left      2  Lower extremity pain, anterior, left  XR tibia fibula 2 vw left    Transfer to other facility    Possible small avulsion fracture of the ankle/ r/o DVT        3  Closed nondisplaced fracture of cuboid of left foot, initial encounter        4  Edema of left lower extremity  Transfer to other facility    r/o DVT        X-Ray:  Suspect buckle fracture of cuboid  X-Ray:  Possible avulsion fx of talus    Patient Instructions   Patient Instructions   1  Go to the ER for an US of your leg; you need to r/o a blood clot  2    Ice 20 min at a time 3-4 x /day  2  Elevate leg as much as possible above your heart; sleep with a couple of pillows under your feet  3  F/u with Ortho in 2-3 days  4  F/u with PCP in 2-3 days  5  Keep the leg boot on until seen by the Orthopedic Dr         Follow up with PCP in 3-5 days  Proceed to  ER if symptoms worsen  Chief Complaint     Chief Complaint   Patient presents with   • Foot Pain     Left foot injury approx 3 weeks ago  Swelling and pain continues worsening by end of day  History of Present Illness       81-year-old white female with a chief complaint of left lower extremity pain  Patient states she was walking about 3 weeks ago and tripped on a rock and fell forward onto her knee  Since that time she has had pain and swelling in her foot and left lower extremity  Patient denies any history of blood clots in her legs  Review of Systems   Review of Systems   Constitutional: Negative for chills and fever  HENT: Negative for congestion and rhinorrhea  Eyes: Negative for discharge and visual disturbance  Respiratory: Negative for shortness of breath and wheezing      Cardiovascular: Negative for chest pain and palpitations  Gastrointestinal: Negative for abdominal pain and vomiting  Endocrine: Negative for polydipsia and polyuria  Genitourinary: Negative for dysuria and hematuria  Musculoskeletal: Positive for arthralgias, gait problem, joint swelling and myalgias  Negative for neck stiffness  Skin: Negative for rash and wound  Neurological: Negative for dizziness and headaches  Psychiatric/Behavioral: Negative for confusion and suicidal ideas           Current Medications       Current Outpatient Medications:   •  anastrozole (ARIMIDEX) 1 mg tablet, Take 1 mg by mouth daily, Disp: , Rfl:   •  aspirin (ECOTRIN LOW STRENGTH) 81 mg EC tablet, Take 81 mg by mouth, Disp: , Rfl:   •  Calcium-Phosphorus-Vitamin D (Citracal +D3) 250-107-500 MG-MG-UNIT CHEW, Chew, Disp: , Rfl:   •  Multiple Vitamin (multivitamin) capsule, Take 1 capsule by mouth daily, Disp: , Rfl:   •  travoprost (TRAVATAN-Z) 0 004 % ophthalmic solution, instill 1 drop into both eyes BEFORE BEDTIME, Disp: , Rfl:   •  aspirin (ECOTRIN LOW STRENGTH) 81 mg EC tablet, Take 81 mg by mouth daily (Patient not taking: Reported on 11/15/2022), Disp: , Rfl:   •  atorvastatin (LIPITOR) 20 mg tablet, Take 1 tablet (20 mg total) by mouth in the morning , Disp: 90 tablet, Rfl: 3  •  hydrochlorothiazide (HYDRODIURIL) 12 5 mg tablet, Take 1 tablet (12 5 mg total) by mouth in the morning , Disp: 90 tablet, Rfl: 3  •  ramipril (ALTACE) 10 MG capsule, Take 1 capsule (10 mg total) by mouth in the morning , Disp: 90 capsule, Rfl: 3    Current Allergies     Allergies as of 03/28/2023 - Reviewed 03/28/2023   Allergen Reaction Noted   • Brimonidine Other (See Comments) 05/28/2014            The following portions of the patient's history were reviewed and updated as appropriate: allergies, current medications, past family history, past medical history, past social history, past surgical history and problem list      Past Medical History: Diagnosis Date   • Breast cancer ([facility] [address] )    • Hyperlipidemia, unspecified    • Hypertension        Past Surgical History:   Procedure Laterality Date   • BREAST LUMPECTOMY     • BREAST SURGERY     • CATARACT EXTRACTION     • EYE SURGERY     • HYSTERECTOMY         History reviewed  No pertinent family history  Medications have been verified  Objective   BP (!) 186/77   Pulse 66   Temp 98 °F (36 7 °C) (Tympanic Core)   Resp 16   SpO2 99%        Physical Exam     Physical Exam  Vitals and nursing note reviewed  Constitutional:       Appearance: Normal appearance  Comments: 80 yo w female with cc LLE pain  HENT:      Head: Normocephalic and atraumatic  Eyes:      Extraocular Movements: Extraocular movements intact  Cardiovascular:      Rate and Rhythm: Normal rate  Pulmonary:      Effort: Pulmonary effort is normal    Musculoskeletal:         General: Swelling, tenderness and signs of injury present  Cervical back: Normal range of motion  Left lower leg: Edema present  Comments: LLE:  + tenderness to calf and anterior tib/fib region with pain on palp; swelling extends from foot to knee region    L foot:  + tenderness to lateral aspect of left foot, with pain on palpation to the cuboid region; pulses intact; pt  Is able to flex/extend foot   Skin:     General: Skin is warm and dry  Neurological:      Mental Status: She is alert     Psychiatric:         Mood and Affect: Mood normal

## 2023-03-28 NOTE — ED PROVIDER NOTES
History  Chief Complaint   Patient presents with   • Leg Pain     Pt states she has pain and swelling in her left lower leg  Sent by PCP to r/o DVT  Pt is in a boot due to a fall she had 3 weeks ago and broke her left foot      This is 81-year-old female with a recent fracture of the left lower extremity  She is already being placed on walking boot and is supposed to follow-up with orthopedics  However she has developed some swelling of the left lower extremity  Her primary doctor sent her here to rule out DVT  Patient has no chest pain or trouble breathing  History provided by:  Patient   used: No        Prior to Admission Medications   Prescriptions Last Dose Informant Patient Reported? Taking? Calcium-Phosphorus-Vitamin D (Citracal +D3) 250-107-500 MG-MG-UNIT CHEW   Yes No   Sig: Chew   Multiple Vitamin (multivitamin) capsule   Yes No   Sig: Take 1 capsule by mouth daily   anastrozole (ARIMIDEX) 1 mg tablet   Yes No   Sig: Take 1 mg by mouth daily   aspirin (ECOTRIN LOW STRENGTH) 81 mg EC tablet   Yes No   Sig: Take 81 mg by mouth daily   Patient not taking: Reported on 11/15/2022   aspirin (ECOTRIN LOW STRENGTH) 81 mg EC tablet   Yes No   Sig: Take 81 mg by mouth   atorvastatin (LIPITOR) 20 mg tablet   No No   Sig: Take 1 tablet (20 mg total) by mouth in the morning  hydrochlorothiazide (HYDRODIURIL) 12 5 mg tablet   No No   Sig: Take 1 tablet (12 5 mg total) by mouth in the morning  ramipril (ALTACE) 10 MG capsule   No No   Sig: Take 1 capsule (10 mg total) by mouth in the morning     travoprost (TRAVATAN-Z) 0 004 % ophthalmic solution   Yes No   Sig: instill 1 drop into both eyes BEFORE BEDTIME      Facility-Administered Medications: None       Past Medical History:   Diagnosis Date   • Breast cancer ([facility] [address] )    • Hyperlipidemia, unspecified    • Hypertension        Past Surgical History:   Procedure Laterality Date   • BREAST LUMPECTOMY     • BREAST SURGERY     • CATARACT EXTRACTION     • EYE SURGERY     • HYSTERECTOMY         History reviewed  No pertinent family history  I have reviewed and agree with the history as documented  E-Cigarette/Vaping     E-Cigarette/Vaping Substances     Social History     Tobacco Use   • Smoking status: Never   • Smokeless tobacco: Never       Review of Systems   Constitutional: Negative for chills and fever  HENT: Negative for ear pain and sore throat  Eyes: Negative for pain and visual disturbance  Respiratory: Negative for cough and shortness of breath  Cardiovascular: Negative for chest pain and palpitations  Gastrointestinal: Negative for abdominal pain and vomiting  Genitourinary: Negative for dysuria and hematuria  Musculoskeletal: Negative for arthralgias and back pain  Skin: Negative for color change and rash  Neurological: Negative for seizures and syncope  All other systems reviewed and are negative  Physical Exam  Physical Exam  Vitals and nursing note reviewed  Constitutional:       General: She is not in acute distress  Appearance: Normal appearance  She is well-developed and normal weight  HENT:      Head: Normocephalic and atraumatic  Right Ear: External ear normal       Left Ear: External ear normal       Nose: Nose normal    Eyes:      Conjunctiva/sclera: Conjunctivae normal    Cardiovascular:      Rate and Rhythm: Normal rate  Heart sounds: No murmur heard  Pulmonary:      Effort: Pulmonary effort is normal  No respiratory distress  Abdominal:      General: Abdomen is flat  Palpations: Abdomen is soft  Tenderness: There is no abdominal tenderness  Musculoskeletal:         General: Swelling present  No tenderness  Cervical back: Neck supple  Left lower leg: Edema present  Comments: Nonpitting edema of the lower extremity  No cellulitis changes  Negative Homans   Skin:     General: Skin is warm and dry        Capillary Refill: Capillary refill takes less than 2 seconds  Neurological:      General: No focal deficit present  Mental Status: She is alert and oriented to person, place, and time  Psychiatric:         Mood and Affect: Mood normal          Thought Content: Thought content normal          Judgment: Judgment normal          Vital Signs  ED Triage Vitals [03/28/23 1520]   Temperature Pulse Respirations Blood Pressure SpO2   98 °F (36 7 °C) 57 16 (!) 183/68 99 %      Temp src Heart Rate Source Patient Position - Orthostatic VS BP Location FiO2 (%)   -- Monitor -- Left arm --      Pain Score       --           Vitals:    03/28/23 1520   BP: (!) 183/68   Pulse: 57         Visual Acuity      ED Medications  Medications - No data to display    Diagnostic Studies  Results Reviewed     None                 VAS lower limb venous duplex study, unilateral/limited    (Results Pending)              Procedures  Procedures         ED Course                               SBIRT 22yo+    Flowsheet Row Most Recent Value   SBIRT (23 yo +)    In order to provide better care to our patients, we are screening all of our patients for alcohol and drug use  Would it be okay to ask you these screening questions? Yes Filed at: 03/28/2023 1620   Initial Alcohol Screen: US AUDIT-C     1  How often do you have a drink containing alcohol? 0 Filed at: 03/28/2023 1620   2  How many drinks containing alcohol do you have on a typical day you are drinking? 0 Filed at: 03/28/2023 1620   3a  Male UNDER 65: How often do you have five or more drinks on one occasion? 0 Filed at: 03/28/2023 1620   3b  FEMALE Any Age, or MALE 65+: How often do you have 4 or more drinks on one occassion? 0 Filed at: 03/28/2023 1620   Audit-C Score 0 Filed at: 03/28/2023 1620   CATHI: How many times in the past year have you    Used an illegal drug or used a prescription medication for non-medical reasons?  Never Filed at: 03/28/2023 1620                    Medical Decision Making  Wilbur diagnosis includes swelling secondary to fracture and inflammation, DVT    Amount and/or Complexity of Data Reviewed  Discussion of management or test interpretation with external provider(s): Ultrasound of the left lower extremity to rule out DVT was negative        Disposition  Final diagnoses:   Leg swelling     Time reflects when diagnosis was documented in both MDM as applicable and the Disposition within this note     Time User Action Codes Description Comment    3/28/2023  4:41 PM Satnider Noriega Add [M79 89] Leg swelling       ED Disposition     ED Disposition   Discharge    Condition   Stable    Date/Time   Tue Mar 28, 2023  4:41 PM    Comment   Sandra Wellington discharge to home/self care  Follow-up Information     Follow up With Specialties Details Why Contact Info Additional Information    [address] [facility] In 2 days  [address] [address] [address], [address], [place], [place], [address] [phone]          Patient's Medications   Discharge Prescriptions    No medications on file       No discharge procedures on file      PDMP Review     None          ED Provider  Electronically Signed by           Rachelle Win MD  03/28/23 2530

## 2023-03-28 NOTE — PATIENT INSTRUCTIONS
Go to the ER for an US of your leg; you need to r/o a blood clot  2    Ice 20 min at a time 3-4 x /day  Elevate leg as much as possible above your heart; sleep with a couple of pillows under your feet  F/u with Ortho in 2-3 days  F/u with PCP in 2-3 days  Keep the leg boot on until seen by the Orthopedic

## 2023-03-28 NOTE — DISCHARGE INSTRUCTIONS
A  personal message from Dr Radha Haines,  Thank you so much for allowing me to care for you today  I pride myself in the care and attention I give all my patients  I hope you were a witness to this tonight  If for any reason your condition does not improve, worsens, or you have a question that was not answered during your visit you can feel free to text me on my personal phone   # [phone]  I will answer to your message and continue your care past your emergency room visit

## 2023-03-29 ENCOUNTER — OFFICE VISIT (OUTPATIENT)
Dept: PHYSICAL THERAPY | Facility: CLINIC | Age: 83
End: 2023-03-29

## 2023-03-29 DIAGNOSIS — G89.29 CHRONIC RIGHT SHOULDER PAIN: Primary | ICD-10-CM

## 2023-03-29 DIAGNOSIS — M25.511 CHRONIC RIGHT SHOULDER PAIN: Primary | ICD-10-CM

## 2023-03-29 DIAGNOSIS — M75.121 COMPLETE TEAR OF RIGHT ROTATOR CUFF, UNSPECIFIED WHETHER TRAUMATIC: ICD-10-CM

## 2023-03-29 NOTE — PROGRESS NOTES
Daily Note     Today's date: 3/29/2023  Patient name: Joseluis Heller  : 1940  MRN: [id number]  Referring provider: Jacqlyn Hamman, MD  Dx:   Encounter Diagnosis     ICD-10-CM    1  Chronic right shoulder pain  M25 511     G89 29       2  Complete tear of right rotator cuff, unspecified whether traumatic  M75 121                      Subjective: Pt presented to the clinic today with a walking boot on her LLE  She reports that she fell three weeks ago and had been noticing an increase in swelling on her foot  She went to the doctor yesterday and imaging showed 2 breaks in her foot  She said she does not have much pain in her foot, but it is uncomfortable, and was recommended to wear the boot 5-6 weeks  She reports that her right shoulder has been good, but certain activities give her brief periods of pain  Objective: See treatment diary below      Assessment: Tolerated treatment fair  Verbal and manual cues to keep right shoulder relaxed and avoid hiking during right arm elevation exercises  With tactile cues for relocation of the scapula and [address] position during reaching exercises, pt reported less pain in the anterior GH joint and over the lateral portion of right deltoid  Increased difficulty with ER at web slide, AAROM performed  Pt declined cold pack today secondary to needing to leave for a trip after her session  Patient would benefit from continued PT      Plan: Progress treatment as tolerated  Precautions:  Hx Breast Cancer    NO E-STIM OR ULTRASOUND       Manuals 3/27/23 3-29-23 3-13-23 3-16-23 3-21-23   PROM - Mobs right shoulder 10' PROM only  10' 10' 10' 10'                           Neuro Re-Ed         LTP/MTP Red x30 ea Red 3x10 each Red 2x10  Red 20x ea Red 30x   T-band ER/IR ER yel x20 w/AAROM IR red x30 Yellow ER 2x10/ Red IR 3x10 AAROM for ER ER yellow 20x IR red 30x ER yellow 20x w/ AAROM  IR red 20x ER yellow 20x w/ AAROM   IR red 30x   T-band Add Red x30 Red 3x10 Red 3x10 "Red 20x Red 30x   Shld Isometrics                                Ther Ex        UBE 8' alt  8'alt 8'   8' 8'     nustep L5 12' L2 10' L3  12' L3  12' L5  12'   pulleys 30x 12x5\" 30x  5\" 20x  (pain) 30x   Bicep curl 2# 20x fadumo 2# 3x10 2#  30x held 2#  20x   Bent over row 2# 20x fadumo 2# 2x10 fadumo 2#  30x held 2#   Finger ladder 10x fwd  x12 scaption with tactile cues to avoid shoulder hiking 10x Fwd 10x  Fwd 10x  Fwd   Wall slide  ==>                              HEP        Ther Activity                        Gait Training                        Modalities        CP 10'  Declined secondary to pt having time restrictions 5' Declined  5'                                                                   "

## 2023-04-03 ENCOUNTER — OFFICE VISIT (OUTPATIENT)
Dept: PHYSICAL THERAPY | Facility: CLINIC | Age: 83
End: 2023-04-03

## 2023-04-03 DIAGNOSIS — M25.511 CHRONIC RIGHT SHOULDER PAIN: Primary | ICD-10-CM

## 2023-04-03 DIAGNOSIS — G89.29 CHRONIC RIGHT SHOULDER PAIN: Primary | ICD-10-CM

## 2023-04-03 DIAGNOSIS — M75.121 COMPLETE TEAR OF RIGHT ROTATOR CUFF, UNSPECIFIED WHETHER TRAUMATIC: ICD-10-CM

## 2023-04-03 NOTE — PROGRESS NOTES
"Daily Note     Today's date: 4/3/2023  Patient name: Pj Bourne  : 1940  MRN: [id number]  Referring provider: Ho Hercules MD  Dx:   Encounter Diagnosis     ICD-10-CM    1  Chronic right shoulder pain  M25 511     G89 29       2  Complete tear of right rotator cuff, unspecified whether traumatic  M75 121                      Subjective: Pt reports that she had a great weekend but she is still having trouble lifting her right arm without assistance from her left arm  Objective: See treatment diary below      Assessment: Tolerated treatment well  Pt reports a brief sharp pain over lateral deltoid that goes down into her elbow during extension of R shoulder during PROM  PROM today included ER, IR, flexion, abduction below 90 degrees of elevation  Pt had increased difficulty with active right shoulder flexion during wall slides and used her LUE for assistance  Patient would benefit from continued PT  Plan: Continue per plan of care  Precautions:  Hx Breast Cancer  NO E-STIM OR ULTRASOUND       Manuals 3/27/23 3-29-23 4-3-23 3-16-23 3-21-23   PROM - Mobs right shoulder 10' PROM only  10' 8' 10' 10'                           Neuro Re-Ed         LTP/MTP Red x30 ea Red 3x10 each Red 3x10  Red 20x ea Red 30x   T-band ER/IR ER yel x20 w/AAROM IR red x30 Yellow ER 2x10/ Red IR 3x10 AAROM for ER Held   pain ER yellow 20x w/ AAROM  IR red 20x ER yellow 20x w/ AAROM   IR red 30x   T-band Add Red x30 Red 3x10 Red 3x10 Red 20x Red 30x   Shld Isometrics                                Ther Ex        UBE 8' alt  8'alt 8'   8' 8'     nustep L5 12' L2 10' L3  12' L3  12' L5  12'   pulleys 30x 12x5\" 30x  5\" 20x  (pain) 30x   Bicep curl 2# 20x fadumo 2# 3x10 2#  30x held 2#  20x   Bent over row 2# 20x fadumo 2# 2x10 fadumo 2#  30x held 2#   Finger ladder 10x fwd  x12 scaption with tactile cues to avoid shoulder hiking 10x Fwd 10x  Fwd 10x  Fwd   Wall slide  ==> 2x10 AAROM, used LUE for assistance                 " HEP        Ther Activity                        Gait Training                        Modalities        CP 10'  Declined secondary to pt having time restrictions 10' Declined  5'

## 2023-04-05 ENCOUNTER — APPOINTMENT (OUTPATIENT)
Dept: PHYSICAL THERAPY | Facility: CLINIC | Age: 83
End: 2023-04-05

## 2023-04-05 ENCOUNTER — OFFICE VISIT (OUTPATIENT)
Dept: OBGYN CLINIC | Facility: CLINIC | Age: 83
End: 2023-04-05

## 2023-04-05 VITALS
HEIGHT: 64 IN | SYSTOLIC BLOOD PRESSURE: 126 MMHG | RESPIRATION RATE: 18 BRPM | HEART RATE: 58 BPM | WEIGHT: 168 LBS | DIASTOLIC BLOOD PRESSURE: 72 MMHG | BODY MASS INDEX: 28.68 KG/M2

## 2023-04-05 DIAGNOSIS — M79.672 PAIN IN LEFT FOOT: ICD-10-CM

## 2023-04-05 DIAGNOSIS — S93.402A SPRAIN OF UNSPECIFIED LIGAMENT OF LEFT ANKLE, INITIAL ENCOUNTER: Primary | ICD-10-CM

## 2023-04-05 RX ORDER — DORZOLAMIDE HYDROCHLORIDE AND TIMOLOL MALEATE 20; 5 MG/ML; MG/ML
SOLUTION/ DROPS OPHTHALMIC
COMMUNITY
Start: 2023-04-03

## 2023-04-05 NOTE — PROGRESS NOTES
HPI:  Patient is a 81y o  year old  female who presents with chief complaint of left foot pain  she went to the urgent care on 3/28/23 for evaluation of her left foot after a fall approximately 1 month prior  She was placed in a CAM walking boot and told to follow up with orthopedics  Patient also went to the ED on 3/28/23 for evaluation of her left lower extremity swelling, possible DVT suspected  Vascular doppler taken 3/28/23 was negative for DVT or superficial thrombophlebitis  Patient has been compliant with wearing the CAM walker  She is full weight bearing at this time and notes minimal discomfort with ambulating       ROS:   General: No fever, no chills, no weight loss, no weight gain  HEENT:  No loss of hearing, no nose bleeds, no sore throat  Eyes:  No eye pain, no red eyes, no visual disturbance  Respiratory:  No cough, no shortness of breath, no wheezing  Cardiovascular:  No chest pain, no palpitations, no edema  GI: No abdominal pain, no nausea, no vomiting  Endocrine: No frequent urination, no excessive thirst  Urinary:  No dysuria, no hematuria, no incontinence  Musculoskeletal: see HPI and PE  Skin:  No rash, no wounds  Neurological:  No dizziness, no headache, no numbness  Psychiatric:  No difficulty concentrating, no depression, no suicide thoughts, no anxiety  Review of all other systems is negative    PMH:  Past Medical History:   Diagnosis Date   • Breast cancer ([facility] [address] )    • Hyperlipidemia, unspecified    • Hypertension    • Rotator cuff injury     Right       PSH:  Past Surgical History:   Procedure Laterality Date   • BREAST LUMPECTOMY     • BREAST SURGERY     • CATARACT EXTRACTION     • EYE SURGERY     • HYSTERECTOMY         Medications:  Current Outpatient Medications   Medication Sig Dispense Refill   • anastrozole (ARIMIDEX) 1 mg tablet Take 1 mg by mouth daily     • aspirin (ECOTRIN LOW STRENGTH) 81 mg EC tablet Take 81 mg by mouth     • atorvastatin (LIPITOR) 20 mg tablet Take 1 tablet (20 "mg total) by mouth in the morning  90 tablet 3   • Calcium-Phosphorus-Vitamin D (Citracal +D3) 250-107-500 MG-MG-UNIT CHEW Chew     • dorzolamide-timolol (COSOPT) 22 3-6 8 MG/ML ophthalmic solution 1 drop 8 am and 8 pm both eyes     • hydrochlorothiazide (HYDRODIURIL) 12 5 mg tablet Take 1 tablet (12 5 mg total) by mouth in the morning  90 tablet 3   • Multiple Vitamin (multivitamin) capsule Take 1 capsule by mouth daily     • ramipril (ALTACE) 10 MG capsule Take 1 capsule (10 mg total) by mouth in the morning  90 capsule 3   • travoprost (TRAVATAN-Z) 0 004 % ophthalmic solution instill 1 drop into both eyes BEFORE BEDTIME       No current facility-administered medications for this visit  Allergies: Allergies   Allergen Reactions   • Brimonidine Other (See Comments)     Eye lashes fell off       Family History:  Family History   Problem Relation Age of Onset   • Heart disease Mother    • Heart disease Father    • Heart disease Sister        Social History:  Social History     Occupational History   • Not on file   Tobacco Use   • Smoking status: Never   • Smokeless tobacco: Never   Vaping Use   • Vaping Use: Never used   Substance and Sexual Activity   • Alcohol use: Yes     Comment: occasionally   • Drug use: Not Currently   • Sexual activity: Not on file       Physical Exam:  General :  Alert, cooperative, no distress, appears stated age  Blood pressure 126/72, pulse 58, resp  rate 18, height 5' 4\" (1 626 m), weight 76 2 kg (168 lb)  Head:  Normocephalic, without obvious abnormality, atraumatic   Eyes:  Conjunctiva/corneas clear, EOM's intact,   Ears: Both ears normal appearance, no hearing deficits      Nose: Nares normal, septum midline, no drainage    Neck: Supple,  trachea midline, no adenopathy, no tenderness, no mass   Back:   Symmetric, no curvature, ROM normal, no tenderness   Lungs:   Respirations unlabored   Chest Wall:  No tenderness or deformity   Extremities: Extremities normal, atraumatic, " no cyanosis or edema      Pulses: 2+ and symmetric   Skin: Skin color, texture, turgor normal, no rashes or lesions      Neurologic: Normal           Left Ankle Exam     Tenderness   The patient is experiencing tenderness in the ATF  Swelling: mild    Range of Motion   The patient has normal left ankle ROM  Muscle Strength   The patient has normal left ankle strength  Other   Erythema: absent  Scars: absent  Sensation: normal  Pulse: present    Comments:  ATFL TTP   Edema present   No ecchymosis   No TTP navicular, medial malleoli, lateral malleoli, cuboid  NVI distally   Full flexion/extension of toes            Imaging Studies: The following imaging studies were reviewed in office today  My findings are noted  XR of the left foot taken on 4/5/23 demonstrates no obvious acute fracture  Assume if previous fracture occurred, routine healing is present  Assessment  Encounter Diagnoses   Name Primary? • Pain in left foot    • Sprain of unspecified ligament of left ankle, initial encounter Yes         Plan: Jesus Wills is a 81 y o  female who presents with a left ankle sprain  · XR images reviewed with the patient today   · Discussed that if a fracture did occur, it has healed and she has no pain associated with the suspected fracture site  · Discussed that due to her minimal pain and discomfort, she can discontinue using the CAM walker at this time  · She can continue to use OTC analgesics for pain   · She can complete all activities to tolerance   · Follow up if symptoms worsen or fail to improve       Scribe Attestation    I,:  Soy Moy am acting as a scribe while in the presence of the attending physician :       I,:  Marguerite Damon MD personally performed the services described in this documentation    as scribed in my presence :

## 2023-04-06 ENCOUNTER — OFFICE VISIT (OUTPATIENT)
Dept: PHYSICAL THERAPY | Facility: CLINIC | Age: 83
End: 2023-04-06

## 2023-04-06 DIAGNOSIS — M25.511 CHRONIC RIGHT SHOULDER PAIN: Primary | ICD-10-CM

## 2023-04-06 DIAGNOSIS — M75.121 COMPLETE TEAR OF RIGHT ROTATOR CUFF, UNSPECIFIED WHETHER TRAUMATIC: ICD-10-CM

## 2023-04-06 DIAGNOSIS — G89.29 CHRONIC RIGHT SHOULDER PAIN: Primary | ICD-10-CM

## 2023-04-06 NOTE — PROGRESS NOTES
"Daily Note     Today's date: 2023  Patient name: Joshua Borden  : 1940  MRN: [id number]  Referring provider: Alexandro Barahona MD  Dx:   Encounter Diagnosis     ICD-10-CM    1  Chronic right shoulder pain  M25 511     G89 29       2  Complete tear of right rotator cuff, unspecified whether traumatic  M75 121                      Subjective: Pt reports that her shoulder is more functional today  Objective: See treatment diary below      Assessment: Tolerated treatment well  Patient demonstrated fatigue post treatment and would benefit from continued PT  Pt required cueing during rows and ext to facilitate improved periscapular contraction and improved GH mechanics  Continue to progress periscapular strengthening in order to improve overall GH mechanics in order to maximize her LOF  Plan: Continue per plan of care  Progress treatment as tolerated  Precautions:  Hx Breast Cancer  NO E-STIM OR ULTRASOUND       Manuals 3/27/23 3-29-23 4-3-23 4/6    PROM - Mobs right shoulder 10' PROM only  10' 8' 8'                            Neuro Re-Ed         LTP/MTP Red x30 ea Red 3x10 each Red 3x10  Red 3x10     T-band ER/IR ER yel x20 w/AAROM IR red x30 Yellow ER 2x10/ Red IR 3x10 AAROM for ER Held   pain     T-band Add Red x30 Red 3x10 Red 3x10 Red 3x10    Shld Isometrics                                Ther Ex        UBE for posture education 8' alt  8'alt 8'   8'alt    nustep L5 12' L2 10' L3  12' L3  12'    pulleys 30x 12x5\" 30x  5\" 30x  5\"    Bicep curl 2# 20x fadumo 2# 3x10 2#  30x 2#  30x    Bent over row 2# 20x fadumo 2# 2x10 fadumo 2#  30x 2#  30x    Finger ladder 10x fwd  x12 scaption with tactile cues to avoid shoulder hiking 10x Fwd 20x Fwd    Wall slide  ==> 2x10 AAROM, used LUE for assistance                              HEP        Ther Activity                        Gait Training                        Modalities        CP 10'  Declined secondary to pt having time restrictions 10'           "

## 2023-04-24 ENCOUNTER — APPOINTMENT (OUTPATIENT)
Dept: PHYSICAL THERAPY | Facility: CLINIC | Age: 83
End: 2023-04-24

## 2023-04-27 ENCOUNTER — OFFICE VISIT (OUTPATIENT)
Dept: PHYSICAL THERAPY | Facility: CLINIC | Age: 83
End: 2023-04-27

## 2023-04-27 DIAGNOSIS — G89.29 CHRONIC RIGHT SHOULDER PAIN: Primary | ICD-10-CM

## 2023-04-27 DIAGNOSIS — M75.121 COMPLETE TEAR OF RIGHT ROTATOR CUFF, UNSPECIFIED WHETHER TRAUMATIC: ICD-10-CM

## 2023-04-27 DIAGNOSIS — M25.511 CHRONIC RIGHT SHOULDER PAIN: Primary | ICD-10-CM

## 2023-04-27 NOTE — PROGRESS NOTES
"Daily Note     Today's date: 2023  Patient name: Shavon Rose  : 1940  MRN: [id number]  Referring provider: Celine Humphries MD  Dx:   Encounter Diagnosis     ICD-10-CM    1  Chronic right shoulder pain  M25 511     G89 29       2  Complete tear of right rotator cuff, unspecified whether traumatic  M75 121           Start Time: 820  Stop Time: 930  Total time in clinic (min): 70 minutes    Subjective: Pt reports that her R shoulder is doing okay even though she has not been to PT in a week  It just feels stiff  She reports that she has her MRI yesterday and should hear the results soon  Objective: See treatment diary below      Assessment: Tolerated treatment fair  Pt reported pain on the descend of finger ladder and with LTP after the second set  Pt reports that her shoulder is stiff during exercises today  Pt reported that her shoulder felt better and that she had relief during manual PROM to R shoulder  Some oscillations performed with PROM to minimize guarding from pt  Verbal and manual cues provided for body mechanics and posture secondary to pt hiking right shoulder during elevation movements of RUE  Patient would benefit from continued PT       Plan: Progress treatment as tolerated  Precautions:  Hx Breast Cancer  NO E-STIM OR ULTRASOUND       Manuals 23 5-56-47 4-19-23 4/27/23 4-10-23   PROM - Mobs right shoulder 12' PROM only   AP glides to R [facility] joint  10' 10' 12' PROM, AP mobs R shoulder, distraction 8'                           Neuro Re-Ed         LTP/MTP Red x30 ea Green 3x10 each Green 3x10  Green 3x10  RUE only 2x10 LTP (pain) Red 3x10    T-band ER/IR        T-band Add Red x30 Green 3x10 Green 3x10 Green 3x10 Red 3x10   Shld Isometrics                                Ther Ex        UBE for posture education 8' alt  8'alt 8'   8'alt 8'alt   nustep L3 12' L3 12' L3  12' L3  12' L3  10'   pulleys 30x 30x 30x  5\" 30x  5\" 30x  5\"   Bicep curl 2# 20x fadumo 2# 3x10 2#  30x 2#  " 30x 2#  30x   Bent over row 2# 20x fadumo 2# 2x10 fadumo 2#  30x 2#  30x 2#  30x R   Finger ladder 20x fwd  20x Fwd 10x scaption 20x Fwd 20x Fwd   Wall slide 3x5 Modified range  degrees 3x5 reps   degree 3x5 reps   degree 3x5 reps   degree  AAROM 2x10 AROM,  Modified range  degrees                           HEP  Updated      Ther Activity                        Gait Training                        Modalities        CP 10'  Declined  10'  10' CP post session

## 2023-04-29 DIAGNOSIS — E78.5 HYPERLIPIDEMIA, UNSPECIFIED HYPERLIPIDEMIA TYPE: ICD-10-CM

## 2023-05-01 ENCOUNTER — OFFICE VISIT (OUTPATIENT)
Dept: PHYSICAL THERAPY | Facility: CLINIC | Age: 83
End: 2023-05-01

## 2023-05-01 DIAGNOSIS — M25.511 CHRONIC RIGHT SHOULDER PAIN: Primary | ICD-10-CM

## 2023-05-01 DIAGNOSIS — M75.121 COMPLETE TEAR OF RIGHT ROTATOR CUFF, UNSPECIFIED WHETHER TRAUMATIC: ICD-10-CM

## 2023-05-01 DIAGNOSIS — G89.29 CHRONIC RIGHT SHOULDER PAIN: Primary | ICD-10-CM

## 2023-05-01 RX ORDER — ATORVASTATIN CALCIUM 20 MG/1
TABLET, FILM COATED ORAL
Qty: 90 TABLET | Refills: 3 | Status: SHIPPED | OUTPATIENT
Start: 2023-05-01

## 2023-05-01 NOTE — PROGRESS NOTES
"Daily Note     Today's date: 2023  Patient name: Krystina Barton  : 1940  MRN: [id number]  Referring provider: Jong Stevenson MD  Dx:   Encounter Diagnosis     ICD-10-CM    1  Chronic right shoulder pain  M25 511     G89 29       2  Complete tear of right rotator cuff, unspecified whether traumatic  M75 121                      Subjective: The shoulder is a little sore today      Objective: See treatment diary below      Assessment: Tolerated treatment well  Reports having MRI of shoulder and will have Ortho follow up later this week to discuss results  Increased pain noted with wall slides so reps limited  Patient would benefit from continued PT      Plan: Continue per plan of care  Precautions:  Hx Breast Cancer  NO E-STIM OR ULTRASOUND       Manuals 4/13/23 3-15-98 4-19-23 4/27/23 5-1-23   PROM - Mobs right shoulder 12' PROM only   AP glides to R [facility] joint  10' 10' 12' PROM, AP mobs R shoulder, distraction 10'                           Neuro Re-Ed         LTP/MTP Red x30 ea Green 3x10 each Green 3x10  Green 3x10  RUE only 2x10 LTP (pain) Green 3x10    T-band ER/IR        T-band Add Red x30 Green 3x10 Green 3x10 Green 3x10 Green  3x10   Shld Isometrics                                Ther Ex        UBE for posture education 8' alt  8'alt 8'   8'alt 8'alt   nustep L3 12' L3 12' L3  12' L3  12' L3  12'   pulleys 30x 30x 30x  5\" 30x  5\" 30x  5\"   Bicep curl 2# 20x fadumo 2# 3x10 2#  30x 2#  30x 2#  30x   Bent over row 2# 20x fadumo 2# 2x10 fadumo 2#  30x 2#  30x 2#  30x R   Finger ladder 20x fwd  20x Fwd 10x scaption 20x Fwd 20x Fwd   Wall slide 3x5 Modified range  degrees 3x5 reps   degree 3x5 reps   degree 3x5 reps   degree  AAROM 1x5 reps (pain)                           HEP  Updated      Ther Activity                        Gait Training                        Modalities        CP 10'  Declined  10'  5' CP                 "

## 2023-05-04 ENCOUNTER — OFFICE VISIT (OUTPATIENT)
Dept: PHYSICAL THERAPY | Facility: CLINIC | Age: 83
End: 2023-05-04

## 2023-05-04 DIAGNOSIS — G89.29 CHRONIC RIGHT SHOULDER PAIN: Primary | ICD-10-CM

## 2023-05-04 DIAGNOSIS — M75.121 COMPLETE TEAR OF RIGHT ROTATOR CUFF, UNSPECIFIED WHETHER TRAUMATIC: ICD-10-CM

## 2023-05-04 DIAGNOSIS — M25.511 CHRONIC RIGHT SHOULDER PAIN: Primary | ICD-10-CM

## 2023-05-04 NOTE — PROGRESS NOTES
"Daily Note     Today's date: 2023  Patient name: Dano Moses  : 1940  MRN: [id number]  Referring provider: Suki Watson MD  Dx:   Encounter Diagnosis     ICD-10-CM    1  Chronic right shoulder pain  M25 511     G89 29       2  Complete tear of right rotator cuff, unspecified whether traumatic  M75 121                      Subjective: Pt reports that her shoulder is feeling pretty good today  She reports that she meets with an orthopedic surgeon on the  to review the results of her MRI and to discuss her options  Pt reports that she would like to continue therapy rather than getting surgery  Objective: See treatment diary below      Assessment: Tolerated treatment well  Some progressions added to increase strength and tolerated well  Pt reported that her shoulder felt better post manual tx  Continue to progress pt as able next session  Patient would benefit from continued PT      Plan: Progress treatment as tolerated  Precautions:  Hx Breast Cancer    NO E-STIM OR ULTRASOUND       Manuals 23   PROM - Mobs right shoulder 10' 10' 10' 12' PROM, AP mobs R shoulder, distraction 10'                           Neuro Re-Ed         LTP/MTP Green x30 ea Green 3x10 each [employer] 3x10  Green 3x10  RUE only 2x10 LTP (pain) Green 3x10    T-band ER/IR        T-band Add Green x30 Green 3x10 Green 3x10 Green 3x10 Green  3x10   Shld Isometrics                                Ther Ex        UBE for posture education 8' alt  8'alt 8'   8'alt 8'alt   nustep L3 15' L3 12' L3  12' L3  12' L3  12'   pulleys 30x 30x 30x  5\" 30x  5\" 30x  5\"   Bicep curl 3# 3x10 fadumo 2# 3x10 2#  30x 2#  30x 2#  30x   Bent over row 3# 3x10 fadumo 2# 2x10 fadumo 2#  30x 2#  30x 2#  30x R   Finger ladder 20x fwd  20x Fwd 10x scaption 20x Fwd 20x Fwd   Wall slide 3x5 Modified range  degrees 3x5 reps   degree 3x5 reps   degree 3x5 reps   degree  AAROM 1x5 reps (pain)                " HEP  Updated      Ther Activity                        Gait Training                        Modalities        CP 10'  Declined  10'  5' CP

## 2023-05-08 ENCOUNTER — OFFICE VISIT (OUTPATIENT)
Dept: PHYSICAL THERAPY | Facility: CLINIC | Age: 83
End: 2023-05-08

## 2023-05-08 DIAGNOSIS — G89.29 CHRONIC RIGHT SHOULDER PAIN: Primary | ICD-10-CM

## 2023-05-08 DIAGNOSIS — M25.511 CHRONIC RIGHT SHOULDER PAIN: Primary | ICD-10-CM

## 2023-05-08 DIAGNOSIS — M75.121 COMPLETE TEAR OF RIGHT ROTATOR CUFF, UNSPECIFIED WHETHER TRAUMATIC: ICD-10-CM

## 2023-05-08 DIAGNOSIS — I10 ESSENTIAL HYPERTENSION: ICD-10-CM

## 2023-05-08 RX ORDER — HYDROCHLOROTHIAZIDE 12.5 MG/1
TABLET ORAL
Qty: 90 TABLET | Refills: 3 | Status: SHIPPED | OUTPATIENT
Start: 2023-05-08

## 2023-05-08 NOTE — PROGRESS NOTES
"Daily Note     Today's date: 2023  Patient name: Kelvin Ribeiro  : 1940  MRN: [id number]  Referring provider: Byron Dawson MD  Dx:   Encounter Diagnosis     ICD-10-CM    1  Chronic right shoulder pain  M25 511     G89 29       2  Complete tear of right rotator cuff, unspecified whether traumatic  M75 121                      Subjective: Pt reported that she is feeling pretty good today and not much pain in her R shoulder  She was bird watching over the weekend and her shoulder got sore later that night from using the binoculars all day, but then improved over the next day  Objective: See treatment diary below      Assessment: Tolerated treatment well  Verbal and manual cues provided to avoid shoulder hiking on the R with finger ladder and towel slides  Pt reports that she feels pain sometimes with extension of the R shoulder, that improved when she is more aware to not hike her shoulder  Performed wall slides in moderate range this session and patient able to actively elevate RUE in scapular plane  Patient would benefit from continued PT      Plan: Progress treatment as tolerated  Precautions:  Hx Breast Cancer    NO E-STIM OR ULTRASOUND       Manuals 5/4/23 5-8-23 4-06-10 4/27/23 5-1-23   PROM - Mobs right shoulder 10' 10' 10' 12' PROM, AP mobs R shoulder, distraction 10'                           Neuro Re-Ed         LTP/MTP Green x30 ea Green 3x10 each Danyell Luster 3x10  Green 3x10  RUE only 2x10 LTP (pain) Green 3x10    T-band ER/IR        T-band Add Green x30 Green 3x10 Green 3x10 Green 3x10 Green  3x10   Shld Isometrics                                Ther Ex        UBE for posture education 8' alt  8'alt 8'   8'alt 8'alt   nustep L3 15' L3 15' L3  12' L3  12' L3  12'   pulleys 30x 30x 30x  5\" 30x  5\" 30x  5\"   Bicep curl 3# 3x10 fadumo 3# 3x10 2#  30x 2#  30x 2#  30x   Bent over row 3# 3x10 fadumo 3# 3x10 fadumo 2#  30x 2#  30x 2#  30x R   Finger ladder 20x fwd  20x Fwd 10x scaption 20x Fwd 20x " Fwd   Wall slide 3x5 Modified range  degrees 3x5 reps   degree 3x5 reps   degree 3x5 reps   degree  AAROM 1x5 reps (pain)                           HEP  Updated      Ther Activity                        Gait Training                        Modalities        CP 10'  10' post session R shoulder 10'  5' CP

## 2023-05-11 ENCOUNTER — OFFICE VISIT (OUTPATIENT)
Dept: PHYSICAL THERAPY | Facility: CLINIC | Age: 83
End: 2023-05-11

## 2023-05-11 DIAGNOSIS — M25.511 CHRONIC RIGHT SHOULDER PAIN: Primary | ICD-10-CM

## 2023-05-11 DIAGNOSIS — M75.121 COMPLETE TEAR OF RIGHT ROTATOR CUFF, UNSPECIFIED WHETHER TRAUMATIC: ICD-10-CM

## 2023-05-11 DIAGNOSIS — G89.29 CHRONIC RIGHT SHOULDER PAIN: Primary | ICD-10-CM

## 2023-05-11 NOTE — PROGRESS NOTES
"Daily Note     Today's date: 2023  Patient name: Josse Powell  : 1940  MRN: [id number]  Referring provider: Bo Reynolds MD  Dx:   Encounter Diagnosis     ICD-10-CM    1  Chronic right shoulder pain  M25 511     G89 29       2  Complete tear of right rotator cuff, unspecified whether traumatic  M75 121                      Subjective: Pt reports that she was feeling a little sore this morning but as she is moving her shoulder, it is starting to feel a little better  Pt reports that she started water aerobics yesterday and it went well  Objective: See treatment diary below      Assessment: Tolerated treatment well  Pt had discomfort with wall slides in RUE; when assistance is provided to reach 90 degrees of flexion, she is able to perform slides in a comfortable range unassisted  PROM performed this session without any discomfort  Patient would benefit from continued PT      Plan: Progress treatment as tolerated  Precautions:  Hx Breast Cancer    NO E-STIM OR ULTRASOUND       Manuals 23   PROM - Mobs right shoulder 10' 10' 10' 12' PROM, AP mobs R shoulder, distraction 10'                           Neuro Re-Ed         LTP/MTP Green x30 ea Green 3x10 each Israel Chars 3x10  Green 3x10  RUE only 2x10 LTP (pain) Green 3x10    T-band ER/IR        T-band Add Green x30 Green 3x10 Green 3x10 Green 3x10 Green  3x10   Shld Isometrics                                Ther Ex        UBE for posture education 8' alt  8'alt 8'  alt 8'alt 8'alt   nustep L3 15' L3 15' L3  15' L3  12' L3  12'   pulleys 30x 30x 30x  5\" 30x  5\" 30x  5\"   Bicep curl 3# 3x10 fadumo 3# 3x10 2#  1x10  3# 2x10 2#  30x 2#  30x   Bent over row 3# 3x10 fadumo 3# 3x10 fadumo 3#  30x 2#  30x 2#  30x R   Finger ladder 20x fwd  20x Fwd 15x scaption 20x Fwd 20x Fwd   Wall slide 3x5 Modified range  degrees 3x5 reps   degree x25 reps   degree 3x5 reps   degree  AAROM 1x5 reps (pain)             " HEP  Updated      Ther Activity                        Gait Training                        Modalities        CP 10'  10' post session R shoulder 10' post session R shoulder  5' CP no

## 2023-05-17 ENCOUNTER — APPOINTMENT (OUTPATIENT)
Dept: PHYSICAL THERAPY | Facility: CLINIC | Age: 83
End: 2023-05-17
Payer: MEDICARE

## 2023-05-18 ENCOUNTER — OFFICE VISIT (OUTPATIENT)
Dept: PHYSICAL THERAPY | Facility: CLINIC | Age: 83
End: 2023-05-18

## 2023-05-18 DIAGNOSIS — G89.29 CHRONIC RIGHT SHOULDER PAIN: Primary | ICD-10-CM

## 2023-05-18 DIAGNOSIS — M75.121 COMPLETE TEAR OF RIGHT ROTATOR CUFF, UNSPECIFIED WHETHER TRAUMATIC: ICD-10-CM

## 2023-05-18 DIAGNOSIS — M25.511 CHRONIC RIGHT SHOULDER PAIN: Primary | ICD-10-CM

## 2023-05-18 NOTE — PROGRESS NOTES
PT RE-EVALUATION     Today's date: 2023  Patient name: Bel Cheney  : 1940  MRN: [id number]  Referring provider: Petey Rock MD  Dx:   Encounter Diagnosis     ICD-10-CM    1  Chronic right shoulder pain  M25 511     G89 29       2  Complete tear of right rotator cuff, unspecified whether traumatic  M75 121                      Assessment  Assessment details: Pt presented with chronic right shoulder pain and progressive loss of function  Presently reports she is unable to elevate UE without assist from LUE  Reports constant pain  Right hand dominant  Reports all ADL's limited  PT notes poor strength and significant limitations in ROM  Recent Ortho MD consult and reports MD feels she has RTC tear  No MRI performed  23:  Pt reports improving functional level however significant limitations remain  Reports decreased overall pain levels  Continued difficulty with lifting/reaching with RUE  Reports MRI revealed multiple RTC tears and arthritis right shoulder  Reports Reverse TSA would be recommended if she wanted surgical correction  Reports MD's advised continue PT  Improved AROM noted  Pt will benefit from continued PT tx to decrease symptoms and improve functional level  Impairments: abnormal or restricted ROM, activity intolerance, impaired physical strength, lacks appropriate home exercise program and pain with function     Prognosis: fair    Goals  ST  Decrease pain 25% 6 wk  2  Increase shoulder AROM by 30-40 degrees 6 wk  3  Increase shoulder strength to 3+/5 6 wk  4  Pt will report no difficulty with activity below shoulder level 6 wk   (met/partially met)  LT  Pt will report 50% decrease in pain 12 wk  2  [facility] 12 wk  3  Increase shoulder strength to [facility] 12 wk  4    Pt will report no limitations with ADL's 12 wk (not met)    Plan  Patient would benefit from: PT eval and skilled physical therapy  Planned modality interventions: cryotherapy and thermotherapy: hydrocollator packs  Planned therapy interventions: joint mobilization, manual therapy, neuromuscular re-education, strengthening, stretching, therapeutic activities, therapeutic exercise, flexibility, functional ROM exercises, home exercise program and postural training  Frequency: 3x week  Duration in weeks: 12  Treatment plan discussed with: patient        Subjective Evaluation    History of Present Illness  Mechanism of injury: Pt presents with right shoulder pain  Reports Ortho MD diagnosed her with right RTC tear  Unable to elevate RUE without assist from LUE  Inability to elevate RUE for the past 6 months  Pain intermittent in shoulder but constant pain in upper arm between shoulder and elbow  Reports intermittent altered sensation right thumb/wrist   Reports crepitus in shoulder  No MRI to date  Had one injection which did not help symptoms  Pain  Current pain ratin  At best pain ratin  At worst pain ratin  Location: Right shoulder/upper arm  Quality: sharp  Relieving factors: rest  Aggravating factors: lifting  Progression: improved    Hand dominance: right    Treatments  Current treatment: physical therapy  Patient Goals  Patient goals for therapy: decreased pain, increased motion, increased strength and independence with ADLs/IADLs          Objective     Postural Observations    Additional Postural Observation Details  Forward head  Forward rounded shoulders    Tenderness     Right Shoulder  Tenderness in the supraspinatus tendon       Additional Tenderness Details  TTP deltoid tuberosity region    Active Range of Motion     Right Shoulder   Flexion: 90 degrees with pain  Internal rotation BTB: L3 with pain    Additional Active Range of Motion Details  ER to ear on Right     Passive Range of Motion     Right Shoulder   Flexion: 145 degrees with pain  Abduction: 110 degrees with pain  External rotation 90°: 20 degrees with pain  Internal "rotation 90°: 80 degrees with pain    Strength/Myotome Testing     Left Shoulder     Isolated Muscles   Upper trapezius: 4     Right Shoulder     Isolated Muscles   Biceps: 3+   Triceps: 3+   Upper trapezius: 4     Additional Strength Details  Fair-/Fair isometric strength Flex, Ext, Add, IR  Poor isometric strength Abd, ER  (sharp pain)    Tests     Right Shoulder   Positive drop arm and empty can  Precautions:  Hx Breast Cancer    NO E-STIM OR ULTRASOUND       Manuals 5/4/23 5-8-23 5-11-23 5/18/23 5-1-23   PROM - Mobs right shoulder 10' 10' 10' 10' 10'                           Neuro Re-Ed         LTP/MTP Green x30 ea Green 3x10 each Sanchez Caio 3x10  Green 3x10   Green 3x10    T-band ER/IR        T-band Add Green x30 Green 3x10 Green 3x10 Green 3x10 Green  3x10   Shld Isometrics                                Ther Ex        UBE for posture education 8' alt  8'alt 8'  alt 8'alt 8'alt   nustep L3 15' L3 15' L3  15' L3  15' L3  12'   pulleys 30x 30x 30x  5\" 30x  5\" 30x  5\"   Bicep curl 3# 3x10 fadumo 3# 3x10 2#  1x10  3# 2x10 3#  30x 2#  30x   Bent over row 3# 3x10 fadumo 3# 3x10 fadumo 3#  30x 3#  30x 2#  30x R   Finger ladder 20x fwd  20x Fwd 15x scaption 15x Scaption 20x Fwd   Wall slide 3x5 Modified range  degrees 3x5 reps   degree x25 reps   degree  1x5 reps (pain)                           HEP  Updated      Ther Activity                        Gait Training                        Modalities        CP 10'  10' post session R shoulder 10' post session R shoulder Declined  5' CP                         "

## 2023-05-18 NOTE — LETTER
May 18, 2023    MD Elmer Lacey [id number]    Patient: Mojgan Wagner   YOB: 1940   Date of Visit: 2023     Encounter Diagnosis     ICD-10-CM    1  Chronic right shoulder pain  M25 511     G89 29       2  Complete tear of right rotator cuff, unspecified whether traumatic  M75 121           Dear Dr Semaj Tang:    Thank you for your recent referral of Mojgan Wagner  Please review the attached evaluation summary from Lori's recent visit  Please verify that you agree with the plan of care by signing the attached order  If you have any questions or concerns, please do not hesitate to call  I sincerely appreciate the opportunity to share in the care of one of your patients and hope to have another opportunity to work with you in the near future  Sincerely,    Sarahy Sun, PT      Referring Provider:      I certify that I have read the below Plan of Care and certify the need for these services furnished under this plan of treatment while under my care  MD Elmer Lacey [id number]  Via Fax: [phone]          PT RE-EVALUATION     Today's date: 2023  Patient name: Mojgan Wagner  : 1940  MRN: [id number]  Referring provider: Christiana Siddiqui MD  Dx:   Encounter Diagnosis     ICD-10-CM    1  Chronic right shoulder pain  M25 511     G89 29       2  Complete tear of right rotator cuff, unspecified whether traumatic  M75 121                      Assessment  Assessment details: Pt presented with chronic right shoulder pain and progressive loss of function  Presently reports she is unable to elevate UE without assist from LUE  Reports constant pain  Right hand dominant  Reports all ADL's limited  PT notes poor strength and significant limitations in ROM  Recent Ortho MD consult and reports MD feels she has RTC tear  No MRI performed        23:  Pt reports improving functional level however significant limitations remain  Reports decreased overall pain levels  Continued difficulty with lifting/reaching with RUE  Reports MRI revealed multiple RTC tears and arthritis right shoulder  Reports Reverse TSA would be recommended if she wanted surgical correction  Reports MD's advised continue PT  Improved AROM noted  Pt will benefit from continued PT tx to decrease symptoms and improve functional level  Impairments: abnormal or restricted ROM, activity intolerance, impaired physical strength, lacks appropriate home exercise program and pain with function     Prognosis: fair    Goals  ST  Decrease pain 25% 6 wk  2  Increase shoulder AROM by 30-40 degrees 6 wk  3  Increase shoulder strength to 3+/5 6 wk  4  Pt will report no difficulty with activity below shoulder level 6 wk   (met/partially met)  LT  Pt will report 50% decrease in pain 12 wk  2  Increase shoulder AROM to [facility] 12 wk  3  Increase shoulder strength to [facility] 12 wk  4  Pt will report no limitations with ADL's 12 wk (not met)    Plan  Patient would benefit from: PT eval and skilled physical therapy  Planned modality interventions: cryotherapy and thermotherapy: hydrocollator packs  Planned therapy interventions: joint mobilization, manual therapy, neuromuscular re-education, strengthening, stretching, therapeutic activities, therapeutic exercise, flexibility, functional ROM exercises, home exercise program and postural training  Frequency: 3x week  Duration in weeks: 12  Treatment plan discussed with: patient        Subjective Evaluation    History of Present Illness  Mechanism of injury: Pt presents with right shoulder pain  Reports Ortho MD diagnosed her with right RTC tear  Unable to elevate RUE without assist from LUE  Inability to elevate RUE for the past 6 months  Pain intermittent in shoulder but constant pain in upper arm between shoulder and elbow    Reports intermittent altered sensation right thumb/wrist   Reports crepitus in shoulder  No MRI to date  Had one injection which did not help symptoms  Pain  Current pain ratin  At best pain ratin  At worst pain ratin  Location: Right shoulder/upper arm  Quality: sharp  Relieving factors: rest  Aggravating factors: lifting  Progression: improved    Hand dominance: right    Treatments  Current treatment: physical therapy  Patient Goals  Patient goals for therapy: decreased pain, increased motion, increased strength and independence with ADLs/IADLs          Objective     Postural Observations    Additional Postural Observation Details  Forward head  Forward rounded shoulders    Tenderness     Right Shoulder  Tenderness in the supraspinatus tendon  Additional Tenderness Details  TTP deltoid tuberosity region    Active Range of Motion     Right Shoulder   Flexion: 90 degrees with pain  Internal rotation BTB: L3 with pain    Additional Active Range of Motion Details  ER to ear on Right     Passive Range of Motion     Right Shoulder   Flexion: 145 degrees with pain  Abduction: 110 degrees with pain  External rotation 90°: 20 degrees with pain  Internal rotation 90°: 80 degrees with pain    Strength/Myotome Testing     Left Shoulder     Isolated Muscles   Upper trapezius: 4     Right Shoulder     Isolated Muscles   Biceps: 3+   Triceps: 3+   Upper trapezius: 4     Additional Strength Details  Fair-/Fair isometric strength Flex, Ext, Add, IR  Poor isometric strength Abd, ER  (sharp pain)    Tests     Right Shoulder   Positive drop arm and empty can  Precautions:  Hx Breast Cancer    NO E-STIM OR ULTRASOUND       Manuals 23   PROM - Mobs right shoulder 10' 10' 10' 10' 10'                           Neuro Re-Ed         LTP/MTP Green x30 ea Green 3x10 each Ankit Miracle 3x10  Green 3x10   Green 3x10    T-band ER/IR        T-band Add Green x30 Green 3x10 Green 3x10 Green 3x10 Green  3x10   Shld Isometrics "              Ther Ex        UBE for posture education 8' alt  8'alt 8'  alt 8'alt 8'alt   nustep L3 15' L3 15' L3  15' L3  15' L3  12'   pulleys 30x 30x 30x  5\" 30x  5\" 30x  5\"   Bicep curl 3# 3x10 fadumo 3# 3x10 2#  1x10  3# 2x10 3#  30x 2#  30x   Bent over row 3# 3x10 fadumo 3# 3x10 fadumo 3#  30x 3#  30x 2#  30x R   Finger ladder 20x fwd  20x Fwd 15x scaption 15x Scaption 20x Fwd   Wall slide 3x5 Modified range  degrees 3x5 reps   degree x25 reps   degree  1x5 reps (pain)                           HEP  Updated      Ther Activity                        Gait Training                        Modalities        CP 10'  10' post session R shoulder 10' post session R shoulder Declined  5' CP                                         "

## 2023-05-19 ENCOUNTER — OFFICE VISIT (OUTPATIENT)
Dept: PHYSICAL THERAPY | Facility: CLINIC | Age: 83
End: 2023-05-19

## 2023-05-19 DIAGNOSIS — G89.29 CHRONIC RIGHT SHOULDER PAIN: Primary | ICD-10-CM

## 2023-05-19 DIAGNOSIS — M75.121 COMPLETE TEAR OF RIGHT ROTATOR CUFF, UNSPECIFIED WHETHER TRAUMATIC: ICD-10-CM

## 2023-05-19 DIAGNOSIS — M25.511 CHRONIC RIGHT SHOULDER PAIN: Primary | ICD-10-CM

## 2023-05-19 NOTE — PROGRESS NOTES
"Daily Note     Today's date: 2023  Patient name: Herman Corcoran  : 1940  MRN: [id number]  Referring provider: Reji Barahona MD  Dx:   Encounter Diagnosis     ICD-10-CM    1  Chronic right shoulder pain  M25 511     G89 29       2  Complete tear of right rotator cuff, unspecified whether traumatic  M75 121                      Subjective: Sandor Ponce reports improvement in R shoulder ROM since coming to skilled PT and feels pain is manageable at this time and wants to avoid surgery  Objective: See treatment diary below      Assessment: Pt most limited in ER and abduction noting during PROM/manual stretching  Visual and VC to ensure correct exercise technique  Some discomfort to lumbar spine with bent over row  (consider use of plinth NV for more support)  Pt would continue to benefit from skilled PT  Plan: Continue with current POC to address pt deficits  Precautions:  Hx Breast Cancer    NO E-STIM OR ULTRASOUND       Manuals 23   PROM - Mobs right shoulder 10' 10' 10' 10' 10'                           Neuro Re-Ed         LTP/MTP Green x30 ea Green 3x10 each Verl Hu 3x10  Green 3x10   grn 3x10 ea   T-band ER/IR     grn 3x10 IR   T-band Add Green x30 Green 3x10 Green 3x10 Green 3x10 grn 3x10   Shld Isometrics                                Ther Ex        UBE for posture education 8' alt  8'alt 8'  alt 8'alt 10' alt    nustep L3 15' L3 15' L3  15' L3  15' L3 15'   pulleys 30x 30x 30x  5\" 30x  5\" 30x 5\"    Bicep curl 3# 3x10 fadumo 3# 3x10 2#  1x10  3# 2x10 3#  30x 3# 3x10   Bent over row 3# 3x10 fadumo 3# 3x10 fadumo 3#  30x 3#  30x 3# 30x   Finger ladder 20x fwd  20x Fwd 15x scaption 15x Scaption 15x scaption    Wall slide 3x5 Modified range  degrees 3x5 reps   degree x25 reps   degree                             HEP  Updated      Ther Activity                        Gait Training                        Modalities        CP 10'  10' post session " R shoulder 10' post session R shoulder Declined  10' pst tx

## 2023-05-24 ENCOUNTER — OFFICE VISIT (OUTPATIENT)
Dept: PHYSICAL THERAPY | Facility: CLINIC | Age: 83
End: 2023-05-24

## 2023-05-24 DIAGNOSIS — M25.511 CHRONIC RIGHT SHOULDER PAIN: Primary | ICD-10-CM

## 2023-05-24 DIAGNOSIS — G89.29 CHRONIC RIGHT SHOULDER PAIN: Primary | ICD-10-CM

## 2023-05-24 DIAGNOSIS — M75.121 COMPLETE TEAR OF RIGHT ROTATOR CUFF, UNSPECIFIED WHETHER TRAUMATIC: ICD-10-CM

## 2023-05-24 NOTE — PROGRESS NOTES
"Daily Note     Today's date: 2023  Patient name: Osmin Campoverde  : 1940  MRN: [id number]  Referring provider: Francesco Bruno MD  Dx:   Encounter Diagnosis     ICD-10-CM    1  Chronic right shoulder pain  M25 511     G89 29       2  Complete tear of right rotator cuff, unspecified whether traumatic  M75 121                      Subjective:  I'm doing ok  Objective: See treatment diary below      Assessment: Tolerated treatment well  Reports continued varied symptoms right shoulder  Reports she can perform 30-45 min of yard work before needing to rest for 15 min  Reports rest decreases soreness with activity  Patient would benefit from continued PT      Plan: Continue per plan of care  Precautions:  Hx Breast Cancer    NO E-STIM OR ULTRASOUND       Manuals 23   PROM - Mobs right shoulder 10' 10' 10' 10' 10'                           Neuro Re-Ed         LTP/MTP Green x30 ea Green 3x10 each Karin Thomas 3x10  Green 3x10   grn 3x10 ea   T-band ER/IR     grn 3x10 IR   T-band Add Green x30 Green 3x10 Green 3x10 Green 3x10 grn 3x10   Shld Isometrics                                Ther Ex        UBE for posture education 8' alt  8'alt 8'  alt 8'alt 10' alt    nustep L3 10' L3 15' L3  15' L3  15' L3 15'   pulleys 30x 30x 30x  5\" 30x  5\" 30x 5\"    Bicep curl 3# 3x10 fadumo 3# 3x10 2#  1x10  3# 2x10 3#  30x 3# 3x10   Bent over row 3# 3x10 fadumo 3# 3x10 fadumo 3#  30x 3#  30x 3# 30x   Finger ladder 15x fwd  20x Fwd 15x scaption 15x Scaption 15x scaption    Wall slide  3x5 reps   degree x25 reps   degree                             HEP  Updated      Ther Activity                        Gait Training                        Modalities        CP home 10' post session R shoulder 10' post session R shoulder Declined  10' pst tx                                                                                              "

## 2023-05-25 ENCOUNTER — APPOINTMENT (OUTPATIENT)
Dept: PHYSICAL THERAPY | Facility: CLINIC | Age: 83
End: 2023-05-25
Payer: MEDICARE

## 2023-05-26 ENCOUNTER — OFFICE VISIT (OUTPATIENT)
Dept: PHYSICAL THERAPY | Facility: CLINIC | Age: 83
End: 2023-05-26

## 2023-05-26 DIAGNOSIS — G89.29 CHRONIC RIGHT SHOULDER PAIN: Primary | ICD-10-CM

## 2023-05-26 DIAGNOSIS — M75.121 COMPLETE TEAR OF RIGHT ROTATOR CUFF, UNSPECIFIED WHETHER TRAUMATIC: ICD-10-CM

## 2023-05-26 DIAGNOSIS — M25.511 CHRONIC RIGHT SHOULDER PAIN: Primary | ICD-10-CM

## 2023-05-26 NOTE — PROGRESS NOTES
"Daily Note     Today's date: 2023  Patient name: Scott Tucker  : 1940  MRN: [id number]  Referring provider: Donavan Romero MD  Dx:   Encounter Diagnosis     ICD-10-CM    1  Chronic right shoulder pain  M25 511     G89 29       2  Complete tear of right rotator cuff, unspecified whether traumatic  M75 121           Start Time: 0815  Stop Time: 0900  Total time in clinic (min): 45 minutes    Subjective: Patient reports usual soreness in R bicep this morning  Objective: See treatment diary below      Assessment: Tolerated treatment well  Patient participated in skilled PT session focused on strengthening, stretching, and ROM  Patient demonstrates improved R shoulder ROM, but continues to experience pain coming back into extension from flexion  Patient would continue to benefit from skilled PT interventions to address strengthening, stretching, and ROM  Patient demonstrated fatigue post treatment and exhibited good technique with therapeutic exercises      Plan: Continue per plan of care  Precautions:  Hx Breast Cancer    NO E-STIM OR ULTRASOUND       Manuals 23   PROM - Mobs right shoulder 10' 10' 10' 10' 10'                           Neuro Re-Ed         LTP/MTP Green x30 ea Green TB 30x ea Green 3x10  Green 3x10   grn 3x10 ea   T-band ER/IR     grn 3x10 IR   T-band Add Green x30 Green TB 30x Green 3x10 Green 3x10 grn 3x10   Shld Isometrics                                Ther Ex        UBE for posture education 8' alt  8' alt 8'  alt 8'alt 10' alt    nustep L3 10' L5 x 15' L3  15' L3  15' L3 15'   pulleys 30x 30x 30x  5\" 30x  5\" 30x 5\"    Bicep curl 3# 3x10 fadumo 3# 3x10 B/L 2#  1x10  3# 2x10 3#  30x 3# 3x10   Bent over row 3# 3x10 fadumo 3# 3x10 B/L 3#  30x 3#  30x 3# 30x   Finger ladder 15x fwd  15x Scap 15x scaption 15x Scaption 15x scaption    Wall slide   x25 reps   degree                             HEP        Ther Activity                      " Gait Training                        Modalities        CP home 10' post session R shoulder 10' post session R shoulder Declined  10' pst tx

## 2023-05-31 ENCOUNTER — OFFICE VISIT (OUTPATIENT)
Dept: PHYSICAL THERAPY | Facility: CLINIC | Age: 83
End: 2023-05-31

## 2023-05-31 DIAGNOSIS — M25.511 CHRONIC RIGHT SHOULDER PAIN: Primary | ICD-10-CM

## 2023-05-31 DIAGNOSIS — G89.29 CHRONIC RIGHT SHOULDER PAIN: Primary | ICD-10-CM

## 2023-05-31 DIAGNOSIS — M75.121 COMPLETE TEAR OF RIGHT ROTATOR CUFF, UNSPECIFIED WHETHER TRAUMATIC: ICD-10-CM

## 2023-05-31 NOTE — PROGRESS NOTES
"Daily Note     Today's date: 2023  Patient name: Osmin Campoverde  : 1940  MRN: [id number]  Referring provider: Francesco Bruno MD  Dx:   Encounter Diagnosis     ICD-10-CM    1  Chronic right shoulder pain  M25 511     G89 29       2  Complete tear of right rotator cuff, unspecified whether traumatic  M75 121                      Subjective:  I'm doing ok      Objective: See treatment diary below      Assessment: Tolerated treatment well  Reports shoulder continues to feel improved following PT tx  Good PROM noted with flex/abd  Tightness continues with ER  Patient would benefit from continued PT      Plan: Continue per plan of care  Precautions:  Hx Breast Cancer    NO E-STIM OR ULTRASOUND       Manuals 23   PROM - Mobs right shoulder 10' 10' 10' 10' 10'                           Neuro Re-Ed         LTP/MTP Green x30 ea Green TB 30x ea Green 3x10  Green 3x10   grn 3x10 ea   T-band ER/IR   Green IR 30x, ER 10x  grn 3x10 IR   T-band Add Green x30 Green TB 30x Green 3x10 Green 3x10 grn 3x10   Shld Isometrics                                Ther Ex        UBE for posture education 8' alt  8' alt 8'  alt 8'alt 10' alt    nustep L3 10' L5 x 15' L5  15' L3  15' L3 15'   pulleys 30x 30x 30x  5\" 30x  5\" 30x 5\"    Bicep curl 3# 3x10 fadumo 3# 3x10 B/L 3# 3x10 3#  30x 3# 3x10   Bent over row 3# 3x10 fadumo 3# 3x10 B/L 3#  30x 3#  30x 3# 30x   Finger ladder 15x fwd  15x Scap 15x scaption 15x Scaption 15x scaption    Wall slide                                HEP        Ther Activity                        Gait Training                        Modalities        CP home 10' post session R shoulder Declined  Declined  10' pst tx                                                                                                  "

## 2023-06-02 ENCOUNTER — OFFICE VISIT (OUTPATIENT)
Dept: PHYSICAL THERAPY | Facility: CLINIC | Age: 83
End: 2023-06-02

## 2023-06-02 DIAGNOSIS — M25.511 CHRONIC RIGHT SHOULDER PAIN: Primary | ICD-10-CM

## 2023-06-02 DIAGNOSIS — G89.29 CHRONIC RIGHT SHOULDER PAIN: Primary | ICD-10-CM

## 2023-06-02 DIAGNOSIS — M75.121 COMPLETE TEAR OF RIGHT ROTATOR CUFF, UNSPECIFIED WHETHER TRAUMATIC: ICD-10-CM

## 2023-06-02 NOTE — PROGRESS NOTES
"Daily Note     Today's date: 2023  Patient name: Christy Grant  : 1940  MRN: [id number]  Referring provider: Dieudonne Collado MD  Dx:   Encounter Diagnosis     ICD-10-CM    1  Chronic right shoulder pain  M25 511     G89 29       2  Complete tear of right rotator cuff, unspecified whether traumatic  M75 121                      Subjective: Jessica Santos reports her R shoulder is improving with physical therapy  She reports most pain discomfort to lateral deltoid  Objective: See treatment diary below      Assessment: Only able to complete 8 reps of scaption finger ladder due to pain on eccentric portion  Overall improvement in R shoulder ROM  Visual and VC to ensure correct exercise technique  Progress as able  Plan: Continue with current POC to address pt deficits  Precautions:  Hx Breast Cancer    NO E-STIM OR ULTRASOUND       Manuals 23   PROM - Mobs right shoulder 10' 10' 10' 10' 10'                           Neuro Re-Ed         LTP/MTP Green x30 ea Green TB 30x ea Green 3x10  grn 3x10 ea grn 3x10 ea   T-band ER/IR   Green IR 30x, ER 10x grn IR x30, ER x10 grn 3x10 IR   T-band Add Green x30 Green TB 30x Green 3x10 grn 3x10 grn 3x10   Shld Isometrics        D2 extension     2x10 grn                     Ther Ex        UBE for posture education 8' alt  8' alt 8'  alt 8' alt  10' alt    nustep L3 10' L5 x 15' L5  15' L5 15' L3 15'   pulleys 30x 30x 30x  5\" 30x 5\"  30x 5\"    Bicep curl 3# 3x10 fadumo 3# 3x10 B/L 3# 3x10 3# 3x10 3# 3x10   Bent over row 3# 3x10 fadumo 3# 3x10 B/L 3#  30x 3# 30x 3# 30x   Finger ladder 15x fwd  15x Scap 15x scaption 8x scaption 15x scaption    Wall slide                                HEP        Ther Activity                        Gait Training                        Modalities        CP home 10' post session R shoulder Declined  Declined  10' pst tx                 "

## 2023-06-07 ENCOUNTER — OFFICE VISIT (OUTPATIENT)
Dept: PHYSICAL THERAPY | Facility: CLINIC | Age: 83
End: 2023-06-07
Payer: MEDICARE

## 2023-06-07 DIAGNOSIS — G89.29 CHRONIC RIGHT SHOULDER PAIN: Primary | ICD-10-CM

## 2023-06-07 DIAGNOSIS — M25.511 CHRONIC RIGHT SHOULDER PAIN: Primary | ICD-10-CM

## 2023-06-07 DIAGNOSIS — M75.121 COMPLETE TEAR OF RIGHT ROTATOR CUFF, UNSPECIFIED WHETHER TRAUMATIC: ICD-10-CM

## 2023-06-07 PROCEDURE — 97112 NEUROMUSCULAR REEDUCATION: CPT

## 2023-06-07 PROCEDURE — 97110 THERAPEUTIC EXERCISES: CPT

## 2023-06-07 PROCEDURE — 97140 MANUAL THERAPY 1/> REGIONS: CPT

## 2023-06-07 NOTE — PROGRESS NOTES
"Daily Note     Today's date: 2023  Patient name: Geetha Carvalho  : 1940  MRN: [id number]  Referring provider: Paulette Escalante MD  Dx:   Encounter Diagnosis     ICD-10-CM    1  Chronic right shoulder pain  M25 511     G89 29       2  Complete tear of right rotator cuff, unspecified whether traumatic  M75 121                      Subjective: Eva Nance reports doing pretty good today in reference to R shoulder but she says she hasn't used her arm much today yet  Objective: See treatment diary below      Assessment: Continues to have pain with finger ladder specifically to anterior/lateral delt but was able to complete 2 additional reps compared to NV  Visual and VC to ensure correct exercise technique  Following finger ladder pt had increased difficulty with strengthening exercises (consider holding NV)  AAROM needed to perform tband ER  Ended with CP to decrease pain in R shoulder  Plan: Continue with current POC to address pt deficits  Precautions:  Hx Breast Cancer    NO E-STIM OR ULTRASOUND       Manuals 23   PROM - Mobs right shoulder 10' 10' 10' 10' 10'                           Neuro Re-Ed         LTP/MTP Green x30 ea Green TB 30x ea Green 3x10  grn 3x10 ea grn 3x10 ea   T-band ER/IR   Green IR 30x, ER 10x grn IR x30, ER x10 grn IR x30  grn ER x   T-band Add Green x30 Green TB 30x Green 3x10 grn 3x10 grn 3x10   Shld Isometrics        D2 extension     2x10 grn  grn x10/ red x10                   Ther Ex        UBE for posture education 8' alt  8' alt 8'  alt 8' alt  8' alt    nustep L3 10' L5 x 15' L5  15' L5 15' L5 15'   pulleys 30x 30x 30x  5\" 30x 5\"  30x 5\"    Bicep curl 3# 3x10 fadumo 3# 3x10 B/L 3# 3x10 3# 3x10 3# 3x10   Bent over row 3# 3x10 fadumo 3# 3x10 B/L 3#  30x 3# 30x    Finger ladder 15x fwd  15x Scap 15x scaption 8x scaption 15x scap   Wall slide                                HEP        Ther Activity                        Gait Training      " Modalities        CP home 10' post session R shoulder Declined  Declined  10' post

## 2023-06-09 ENCOUNTER — OFFICE VISIT (OUTPATIENT)
Dept: PHYSICAL THERAPY | Facility: CLINIC | Age: 83
End: 2023-06-09
Payer: MEDICARE

## 2023-06-09 DIAGNOSIS — M25.511 CHRONIC RIGHT SHOULDER PAIN: Primary | ICD-10-CM

## 2023-06-09 DIAGNOSIS — M75.121 COMPLETE TEAR OF RIGHT ROTATOR CUFF, UNSPECIFIED WHETHER TRAUMATIC: ICD-10-CM

## 2023-06-09 DIAGNOSIS — G89.29 CHRONIC RIGHT SHOULDER PAIN: Primary | ICD-10-CM

## 2023-06-09 PROCEDURE — 97140 MANUAL THERAPY 1/> REGIONS: CPT

## 2023-06-09 PROCEDURE — 97110 THERAPEUTIC EXERCISES: CPT

## 2023-06-09 NOTE — PROGRESS NOTES
"Daily Note     Today's date: 2023  Patient name: Blu Silva  : 1940  MRN: [id number]  Referring provider: Alannah Mckeon MD  Dx:   Encounter Diagnosis     ICD-10-CM    1  Chronic right shoulder pain  M25 511     G89 29       2  Complete tear of right rotator cuff, unspecified whether traumatic  M75 121                      Subjective: Pt reports that she is doing well  She reports that she tried some of the exercises at home and is able to perform more reps with the bands is she does them first, before any other exercises  Objective: See treatment diary below      Assessment: Tolerated treatment well  Pt able to do the finger ladder this visit without pain  [facility]  Pt reported a twinge in right ant shoulder that extended down her bicep with adduction during PROM  Pt reports that GHJ rhythmic oscillations still provide her relief  Verbal cues provided for proper body mechanics with [employer] activities  Patient would benefit from continued PT      Plan: Progress treatment as tolerated  Precautions:  Hx Breast Cancer    NO E-STIM OR ULTRASOUND       Manuals 23   PROM - Mobs right shoulder 10' with rhythmic oscillations   10' 10' 10' 10'                           Neuro Re-Ed         LTP/MTP Green x30 ea Green TB 30x ea Green 3x10  grn 3x10 ea grn 3x10 ea   T-band ER/IR 3x10 Yellow AAROM ER  IR x30 Green    Green IR 30x, ER 10x grn IR x30, ER x10 grn IR x30  grn ER x   T-band Add Green x30 Green TB 30x Green 3x10 grn 3x10 grn 3x10   Shld Isometrics        D2 extension     2x10 grn  grn x10/ red x10                   Ther Ex        UBE for posture education 8' alt  8' alt 8'  alt 8' alt  8' alt    nustep L3 10' L5 x 15' L5  15' L5 15' L5 15'   pulleys 30x 30x 30x  5\" 30x 5\"  30x 5\"    Bicep curl 3# 3x10 fadumo 3# 3x10 B/L 3# 3x10 3# 3x10 3# 3x10   Bent over row 3# 3x10 fadumo 3# 3x10 B/L 3#  30x 3# 30x    Finger " ladder 15x fwd  15x Scap 15x scaption 8x scaption 15x scap   Wall slide                                HEP        Ther Activity                        Gait Training                        Modalities        CP home 10' post session R shoulder Declined  Declined  10' post

## 2023-06-14 ENCOUNTER — OFFICE VISIT (OUTPATIENT)
Dept: PHYSICAL THERAPY | Facility: CLINIC | Age: 83
End: 2023-06-14
Payer: MEDICARE

## 2023-06-14 DIAGNOSIS — G89.29 CHRONIC RIGHT SHOULDER PAIN: Primary | ICD-10-CM

## 2023-06-14 DIAGNOSIS — M25.511 CHRONIC RIGHT SHOULDER PAIN: Primary | ICD-10-CM

## 2023-06-14 DIAGNOSIS — M75.121 COMPLETE TEAR OF RIGHT ROTATOR CUFF, UNSPECIFIED WHETHER TRAUMATIC: ICD-10-CM

## 2023-06-14 PROCEDURE — 97140 MANUAL THERAPY 1/> REGIONS: CPT

## 2023-06-14 PROCEDURE — 97112 NEUROMUSCULAR REEDUCATION: CPT

## 2023-06-14 PROCEDURE — 97110 THERAPEUTIC EXERCISES: CPT

## 2023-06-14 NOTE — PROGRESS NOTES
"Daily Note     Today's date: 2023  Patient name: Juan Chavarria  : 1940  MRN: [id number]  Referring provider: Devan Kelley MD  Dx:   Encounter Diagnosis     ICD-10-CM    1  Chronic right shoulder pain  M25 511     G89 29       2  Complete tear of right rotator cuff, unspecified whether traumatic  M75 121                      Subjective: The shoulder is ok today      Objective: See treatment diary below      Assessment: Tolerated treatment well  Reports continues to perform yard work as able  Reports rest periods required due to shoulder symptoms  Reports intermittent difficulty with ER of shoulder due to weakness  Patient would benefit from continued PT      Plan: Continue per plan of care  Precautions:  Hx Breast Cancer    NO E-STIM OR ULTRASOUND       Manuals 23   PROM - Mobs right shoulder 10' with rhythmic oscillations   10' 10' 10' 10'                           Neuro Re-Ed         LTP/MTP Green x30 ea Green TB 30x ea Green 3x10  grn 3x10 ea grn 3x10 ea   T-band ER/IR 3x10 Yellow AAROM ER  IR x30 Green   Green IR 30x  Yellow ER 30x w/ AAROM Green IR 30x, ER 10x grn IR x30, ER x10 grn IR x30  grn ER x   T-band Add Green x30 Green TB 30x Green 3x10 grn 3x10 grn 3x10   Shld Isometrics        D2 extension     2x10 grn  grn x10/ red x10                   Ther Ex        UBE for posture education 8' alt  8' alt 8'  alt 8' alt  8' alt    nustep L3 10' L5 x 15' L5  15' L5 15' L5 15'   pulleys 30x 30x 30x  5\" 30x 5\"  30x 5\"    Bicep curl 3# 3x10 fadumo 4# 3x10 B/L 3# 3x10 3# 3x10 3# 3x10   Bent over row 3# 3x10 fadumo 4# 3x10 B/L 3#  30x 3# 30x    Finger ladder 15x fwd  15x Scap 15x scaption 8x scaption 15x scap   Wall slide                                HEP        Ther Activity                        Gait Training                        Modalities        CP home home Declined  Declined  10' post                 "

## 2023-06-15 NOTE — PROGRESS NOTES
"Daily Note     Today's date: 2023  Patient name: Yaniv Victoria  : 1940  MRN: [id number]  Referring provider: Samia De Santiago MD  Dx:   Encounter Diagnosis     ICD-10-CM    1  Chronic right shoulder pain  M25 511     G89 29       2  Complete tear of right rotator cuff, unspecified whether traumatic  M75 121                      Subjective: The patient states that her shoulder feels good when she doesn't move it, still has pain with going certain directions  Objective: See treatment diary below      Assessment: Tolerated treatment well  Weakness is noted in her shoulder with certain motions, mostly with ER  She still needs A from other arm when completing ER with TBand  CP x 10 minutes today to end session  Patient would benefit from continued PT      Plan: Continue per plan of care  Precautions:  Hx Breast Cancer    NO E-STIM OR ULTRASOUND       Manuals 23   PROM - Mobs right shoulder 10' with rhythmic oscillations   10' 10' 10' 10'                           Neuro Re-Ed         LTP/MTP Green x30 ea Green TB 30x ea Green 3x10  grn 3x10 ea grn 3x10 ea   T-band ER/IR 3x10 Yellow AAROM ER  IR x30 Green   Green IR 30x  Yellow ER 30x w/ AAROM Green IR 30x  Yellow ER 30x w/AAROM grn IR x30, ER x10 grn IR x30  grn ER x   T-band Add Green x30 Green TB 30x Green 3x10 grn 3x10 grn 3x10   Shld Isometrics        D2 extension     2x10 grn  grn x10/ red x10                   Ther Ex        UBE for posture education 8' alt  8' alt 8'  alt 8' alt  8' alt    nustep L3 10' L5 x 15' L5  15' L5 15' L5 15'   pulleys 30x 30x 30x  5\" 30x 5\"  30x 5\"    Bicep curl 3# 3x10 fadumo 4# 3x10 B/L 4# 3x10 3# 3x10 3# 3x10   Bent over row 3# 3x10 fadumo 4# 3x10 B/L 4#  30x 3# 30x    Finger ladder 15x fwd  15x Scap 15x scaption 8x scaption 15x scap   Wall slide                                HEP        Ther Activity                        Gait Training                        Modalities      " CP home home 10' post tx Declined  10' post

## 2023-06-16 ENCOUNTER — OFFICE VISIT (OUTPATIENT)
Dept: PHYSICAL THERAPY | Facility: CLINIC | Age: 83
End: 2023-06-16
Payer: MEDICARE

## 2023-06-16 DIAGNOSIS — M25.511 CHRONIC RIGHT SHOULDER PAIN: Primary | ICD-10-CM

## 2023-06-16 DIAGNOSIS — G89.29 CHRONIC RIGHT SHOULDER PAIN: Primary | ICD-10-CM

## 2023-06-16 DIAGNOSIS — M75.121 COMPLETE TEAR OF RIGHT ROTATOR CUFF, UNSPECIFIED WHETHER TRAUMATIC: ICD-10-CM

## 2023-06-16 PROCEDURE — 97112 NEUROMUSCULAR REEDUCATION: CPT | Performed by: PHYSICAL THERAPIST

## 2023-06-16 PROCEDURE — 97140 MANUAL THERAPY 1/> REGIONS: CPT | Performed by: PHYSICAL THERAPIST

## 2023-06-16 PROCEDURE — 97110 THERAPEUTIC EXERCISES: CPT | Performed by: PHYSICAL THERAPIST

## 2023-06-21 ENCOUNTER — OFFICE VISIT (OUTPATIENT)
Dept: PHYSICAL THERAPY | Facility: CLINIC | Age: 83
End: 2023-06-21
Payer: MEDICARE

## 2023-06-21 DIAGNOSIS — G89.29 CHRONIC RIGHT SHOULDER PAIN: Primary | ICD-10-CM

## 2023-06-21 DIAGNOSIS — M25.511 CHRONIC RIGHT SHOULDER PAIN: Primary | ICD-10-CM

## 2023-06-21 DIAGNOSIS — M75.121 COMPLETE TEAR OF RIGHT ROTATOR CUFF, UNSPECIFIED WHETHER TRAUMATIC: ICD-10-CM

## 2023-06-21 PROCEDURE — 97110 THERAPEUTIC EXERCISES: CPT

## 2023-06-21 PROCEDURE — 97112 NEUROMUSCULAR REEDUCATION: CPT

## 2023-06-21 PROCEDURE — 97140 MANUAL THERAPY 1/> REGIONS: CPT

## 2023-06-21 NOTE — PROGRESS NOTES
"Daily Note     Today's date: 2023  Patient name: Beryle Smack  : 1940  MRN: [id number]  Referring provider: Teodora Gonsalez MD  Dx:   Encounter Diagnosis     ICD-10-CM    1  Chronic right shoulder pain  M25 511     G89 29       2  Complete tear of right rotator cuff, unspecified whether traumatic  M75 121           Start Time: 0825          Subjective: Adrian Vuong reports that she feels her R shoulder is getting stronger  Objective: See treatment diary below      Assessment: Less pain with finger ladder this visit with eccentric portion of motion  Visual and VC to ensure correct exercise technique  Progressed tband strength for MTP/LTP as patient was no longer challenged; denied any increases in pain  Continues to have limited active ER motion  Pt would continue to benefit from skilled PT  Plan: Continue with current POC to address pt deficits  Consider adding machine resistance NV as patient would like to perform at her home gym  Precautions:  Hx Breast Cancer    NO E-STIM OR ULTRASOUND       Manuals 23   PROM - Mobs right shoulder 10' with rhythmic oscillations   10' 10' 10' 10'                           Neuro Re-Ed         LTP/MTP Green x30 ea Green TB 30x ea Green 3x10  Blue 2x10 ea grn 3x10 ea   T-band ER/IR 3x10 Yellow AAROM ER  IR x30 Green   Green IR 30x  Yellow ER 30x w/ AAROM Green IR 30x  Yellow ER 30x w/AAROM grn IR x30  Red ER x20 AAROM grn IR x30  grn ER x   T-band Add Green x30 Green TB 30x Green 3x10 grn 3x10 grn 3x10   Shld Isometrics        D2 extension      grn x10/ red x10                   Ther Ex        UBE for posture education 8' alt  8' alt 8'  alt 8' alt  8' alt    nustep L3 10' L5 x 15' L5  15' L5 15' L5 15'   pulleys 30x 30x 30x  5\" 30x 5\"  30x 5\"    Bicep curl 3# 3x10 fadumo 4# 3x10 B/L 4# 3x10 4# 3x10 3# 3x10   Bent over row 3# 3x10 fadumo 4# 3x10 B/L 4#  30x 4# x30    Finger ladder 15x fwd  15x Scap 15x scaption x15 scaption  15x " scap   Wall slide                                HEP        Ther Activity                        Gait Training                        Modalities        CP home home 10' post tx Declined  10' post

## 2023-06-22 NOTE — PROGRESS NOTES
"Daily Note     Today's date: 2023  Patient name: Brooks Romero  : 1940  MRN: [id number]  Referring provider: Razia Mobley MD  Dx:   Encounter Diagnosis     ICD-10-CM    1  Chronic right shoulder pain  M25 511     G89 29       2  Complete tear of right rotator cuff, unspecified whether traumatic  M75 121                      Subjective: The shoulder is a little sore and tighter today       Objective: See treatment diary below      Assessment: Tolerated treatment well  RUE fatigue noted with TE today  Continues to report varied symptoms and functional level right shoulder/UE  Patient would benefit from continued PT      Plan: Continue per plan of care  Precautions:  Hx Breast Cancer    NO E-STIM OR ULTRASOUND       Manuals 23   PROM - Mobs right shoulder 10' with rhythmic oscillations   10' 10' 10' 10'                           Neuro Re-Ed         LTP/MTP Green x30 ea Green TB 30x ea Green 3x10  Blue 2x10 ea grn 3x10 ea   T-band ER/IR 3x10 Yellow AAROM ER  IR x30 Green   Green IR 30x  Yellow ER 30x w/ AAROM Green IR 30x  Yellow ER 30x w/AAROM grn IR x30  Red ER x20 AAROM grn IR x30   Red ER x30 AAROM   T-band Add Green x30 Green TB 30x Green 3x10 grn 3x10 grn 3x10   Shld Isometrics        D2 extension                         Ther Ex        UBE for posture education 8' alt  8' alt 8'  alt 8' alt  8' alt    nustep L3 10' L5 x 15' L5  15' L5 15' L5 15'   pulleys 30x 30x 30x  5\" 30x 5\"  30x 5\"    Bicep curl 3# 3x10 fadumo 4# 3x10 B/L 4# 3x10 4# 3x10 4# 3x10   Bent over row 3# 3x10 fadumo 4# 3x10 B/L 4#  30x 4# x30 4#  30x   Finger ladder 15x fwd  15x Scap 15x scaption x15 scaption  15x scap   Wall slide                                HEP        Ther Activity                        Gait Training                        Modalities        CP home home 10' post tx Declined  10' post                 "

## 2023-06-23 ENCOUNTER — OFFICE VISIT (OUTPATIENT)
Dept: PHYSICAL THERAPY | Facility: CLINIC | Age: 83
End: 2023-06-23
Payer: MEDICARE

## 2023-06-23 DIAGNOSIS — M75.121 COMPLETE TEAR OF RIGHT ROTATOR CUFF, UNSPECIFIED WHETHER TRAUMATIC: ICD-10-CM

## 2023-06-23 DIAGNOSIS — G89.29 CHRONIC RIGHT SHOULDER PAIN: Primary | ICD-10-CM

## 2023-06-23 DIAGNOSIS — M25.511 CHRONIC RIGHT SHOULDER PAIN: Primary | ICD-10-CM

## 2023-06-23 PROCEDURE — 97110 THERAPEUTIC EXERCISES: CPT

## 2023-06-23 PROCEDURE — 97140 MANUAL THERAPY 1/> REGIONS: CPT

## 2023-06-23 PROCEDURE — 97112 NEUROMUSCULAR REEDUCATION: CPT

## 2023-06-28 ENCOUNTER — EVALUATION (OUTPATIENT)
Dept: PHYSICAL THERAPY | Facility: CLINIC | Age: 83
End: 2023-06-28
Payer: MEDICARE

## 2023-06-28 DIAGNOSIS — G89.29 CHRONIC RIGHT SHOULDER PAIN: Primary | ICD-10-CM

## 2023-06-28 DIAGNOSIS — M25.511 CHRONIC RIGHT SHOULDER PAIN: Primary | ICD-10-CM

## 2023-06-28 DIAGNOSIS — M75.121 COMPLETE TEAR OF RIGHT ROTATOR CUFF, UNSPECIFIED WHETHER TRAUMATIC: ICD-10-CM

## 2023-06-28 PROCEDURE — 97140 MANUAL THERAPY 1/> REGIONS: CPT

## 2023-06-28 PROCEDURE — 97110 THERAPEUTIC EXERCISES: CPT

## 2023-06-28 NOTE — PROGRESS NOTES
PT RE-EVALUATION     Today's date: 2023  Patient name: John Mcknight  : 1940  MRN: [id number]  Referring provider: Delphine Hernandez MD  Dx:   Encounter Diagnosis     ICD-10-CM    1  Chronic right shoulder pain  M25 511     G89 29       2  Complete tear of right rotator cuff, unspecified whether traumatic  M75 121                      Assessment  Assessment details: Pt presented with chronic right shoulder pain and progressive loss of function  Presently reports she is unable to elevate UE without assist from LUE  Reports constant pain  Right hand dominant  Reports all ADL's limited  PT notes poor strength and significant limitations in ROM  Recent Ortho MD consult and reports MD feels she has RTC tear  No MRI performed  23:  Pt reports continued improvement in functional levels with use of right shoulder  Reports continued varied pain levels primarily in right upper arm  Improved elevation of UE noted  Does report weakness and can only lift light objects presently with RUE  Does report requiring rest periods with activity due to soreness shoulder/upper arm  Rest does help resolve symptoms  Feels continued improvement with PT tx  Will benefit from continued PT tx to decrease symptoms and improve functional level  Impairments: abnormal or restricted ROM, activity intolerance, impaired physical strength, lacks appropriate home exercise program and pain with function     Prognosis: fair    Goals  ST  Decrease pain 25% 6 wk  2  Increase shoulder AROM by 30-40 degrees 6 wk  3  Increase shoulder strength to 3+/5 6 wk  4  Pt will report no difficulty with activity below shoulder level 6 wk   (met/partially met)  LT  Pt will report 50% decrease in pain 12 wk  2  Increase shoulder AROM to [facility] 12 wk  3  Increase shoulder strength to [facility] 12 wk  4    Pt will report no limitations with ADL's 12 wk (partially met)    Plan  Patient would benefit from: PT eval and skilled physical therapy  Planned modality interventions: cryotherapy and thermotherapy: hydrocollator packs  Planned therapy interventions: joint mobilization, manual therapy, neuromuscular re-education, strengthening, stretching, therapeutic activities, therapeutic exercise, flexibility, functional ROM exercises, home exercise program and postural training  Frequency: 3x week  Duration in weeks: 12  Treatment plan discussed with: patient        Subjective Evaluation    History of Present Illness  Mechanism of injury: Pt presents with right shoulder pain  Reports Ortho MD diagnosed her with right RTC tear  Unable to elevate RUE without assist from LUE  Inability to elevate RUE for the past 6 months  Pain intermittent in shoulder but constant pain in upper arm between shoulder and elbow  Reports intermittent altered sensation right thumb/wrist   Reports crepitus in shoulder  No MRI to date  Had one injection which did not help symptoms      Pain  Current pain ratin  At best pain ratin  At worst pain ratin  Location: Right shoulder/upper arm  Quality: sharp and dull ache  Relieving factors: rest  Aggravating factors: lifting  Progression: improved    Hand dominance: right    Treatments  Current treatment: physical therapy  Patient Goals  Patient goals for therapy: decreased pain, increased motion, increased strength and independence with ADLs/IADLs          Objective     Postural Observations    Additional Postural Observation Details  Forward head  Forward rounded shoulders    Tenderness     Right Shoulder  No tenderness     Active Range of Motion     Right Shoulder   Flexion: 110 degrees   External rotation BTH: C3   Internal rotation BTB: L3     Strength/Myotome Testing     Left Shoulder     Isolated Muscles   Upper trapezius: 4     Right Shoulder     Planes of Motion   Flexion: 3+   Abduction: 3+   External rotation at 0°: 2   Internal rotation at 0°: 3+     Isolated Muscles   Biceps: 4-   Triceps: 4- "  Upper trapezius: 4     Additional Strength Details  MMT in available ROM    Tests     Right Shoulder   Positive empty can  Precautions:  Hx Breast Cancer    NO E-STIM OR ULTRASOUND       Manuals 6/28/23 6-14-23 6/16 6/21/23 6/23/23   PROM - Mobs right shoulder 10'  10' 10' 10' 10'                           Neuro Re-Ed         LTP/MTP Green x30 ea Green TB 30x ea Green 3x10  Blue 2x10 ea grn 3x10 ea   T-band ER/IR Red IR 3x10  Red ER 3x10 w/ AAROM    Green IR 30x  Yellow ER 30x w/ AAROM Green IR 30x  Yellow ER 30x w/AAROM grn IR x30  Red ER x20 AAROM grn IR x30   Red ER x30 AAROM   T-band Add Green x30 Green TB 30x Green 3x10 grn 3x10 grn 3x10   Shld Isometrics        D2 extension                         Ther Ex        UBE for posture education 8' alt  8' alt 8'  alt 8' alt  8' alt    nustep L3 15' L5 x 15' L5  15' L5 15' L5 15'   pulleys 30x 30x 30x  5\" 30x 5\"  30x 5\"    Bicep curl 4# 3x10 fadumo 4# 3x10 B/L 4# 3x10 4# 3x10 4# 3x10   Bent over row 4# 3x10 fadumo 4# 3x10 B/L 4#  30x 4# x30 4#  30x   Finger ladder 15x fwd  15x Scap 15x scaption x15 scaption  15x scap   Wall slide                                HEP        Ther Activity                        Gait Training                        Modalities        CP home home 10' post tx Declined  10' post                  "

## 2023-06-28 NOTE — LETTER
2023    Mary Reyes MD  [address] [place] [address] Osteopathy    Patient: Nicola Moctezuma   YOB: 1940   Date of Visit: 2023     Encounter Diagnosis     ICD-10-CM    1  Chronic right shoulder pain  M25 511     G89 29       2  Complete tear of right rotator cuff, unspecified whether traumatic  M75 121           Dear Dr Connie Villeda:    Thank you for your recent referral of Nicola Moctezuma  Please review the attached evaluation summary from Lori's recent visit  Please verify that you agree with the plan of care by signing the attached order  If you have any questions or concerns, please do not hesitate to call  I sincerely appreciate the opportunity to share in the care of one of your patients and hope to have another opportunity to work with you in the near future  Sincerely,    Gaurav Boyle, PT      Referring Provider:      I certify that I have read the below Plan of Care and certify the need for these services furnished under this plan of treatment while under my care  Mary Reyes MD  [facility] [address]  Via Fax: [phone]          PT RE-EVALUATION     Today's date: 2023  Patient name: Nicola Moctezuma  : 1940  MRN: [id number]  Referring provider: Dia Penn MD  Dx:   Encounter Diagnosis     ICD-10-CM    1  Chronic right shoulder pain  M25 511     G89 29       2  Complete tear of right rotator cuff, unspecified whether traumatic  M75 121                      Assessment  Assessment details: Pt presented with chronic right shoulder pain and progressive loss of function  Presently reports she is unable to elevate UE without assist from LUE  Reports constant pain  Right hand dominant  Reports all ADL's limited  PT notes poor strength and significant limitations in ROM  Recent Ortho MD consult and reports MD feels she has RTC tear  No MRI performed        23:  Pt reports continued improvement in functional levels with use of right shoulder  Reports continued varied pain levels primarily in right upper arm  Improved elevation of UE noted  Does report weakness and can only lift light objects presently with RUE  Does report requiring rest periods with activity due to soreness shoulder/upper arm  Rest does help resolve symptoms  Feels continued improvement with PT tx  Will benefit from continued PT tx to decrease symptoms and improve functional level  Impairments: abnormal or restricted ROM, activity intolerance, impaired physical strength, lacks appropriate home exercise program and pain with function     Prognosis: fair    Goals  ST  Decrease pain 25% 6 wk  2  Increase shoulder AROM by 30-40 degrees 6 wk  3  Increase shoulder strength to 3+/5 6 wk  4  Pt will report no difficulty with activity below shoulder level 6 wk   (met/partially met)  LT  Pt will report 50% decrease in pain 12 wk  2  [facility]  3  Increase shoulder strength to [facility] 12 wk  4  Pt will report no limitations with ADL's 12 wk (partially met)    Plan  Patient would benefit from: PT eval and skilled physical therapy  Planned modality interventions: cryotherapy and thermotherapy: hydrocollator packs  Planned therapy interventions: joint mobilization, manual therapy, neuromuscular re-education, strengthening, stretching, therapeutic activities, therapeutic exercise, flexibility, functional ROM exercises, home exercise program and postural training  Frequency: 3x week  Duration in weeks: 12  Treatment plan discussed with: patient        Subjective Evaluation    History of Present Illness  Mechanism of injury: Pt presents with right shoulder pain  Reports Ortho MD diagnosed her with right RTC tear  Unable to elevate RUE without assist from LUE  Inability to elevate RUE for the past 6 months  Pain intermittent in shoulder but constant pain in upper arm between shoulder and elbow    Reports intermittent altered sensation right thumb/wrist   Reports crepitus in shoulder  No MRI to date  Had one injection which did not help symptoms  Pain  Current pain ratin  At best pain ratin  At worst pain ratin  Location: Right shoulder/upper arm  Quality: sharp and dull ache  Relieving factors: rest  Aggravating factors: lifting  Progression: improved    Hand dominance: right    Treatments  Current treatment: physical therapy  Patient Goals  Patient goals for therapy: decreased pain, increased motion, increased strength and independence with ADLs/IADLs          Objective     Postural Observations    Additional Postural Observation Details  Forward head  Forward rounded shoulders    Tenderness     Right Shoulder  No tenderness     Active Range of Motion     Right Shoulder   Flexion: 110 degrees   External rotation BTH: C3   Internal rotation BTB: L3     Strength/Myotome Testing     Left Shoulder     Isolated Muscles   Upper trapezius: 4     Right Shoulder     Planes of Motion   Flexion: 3+   Abduction: 3+   External rotation at 0°: 2   Internal rotation at 0°: 3+     Isolated Muscles   Biceps: 4-   Triceps: 4-   Upper trapezius: 4     Additional Strength Details  MMT in available ROM    Tests     Right Shoulder   Positive empty can  Precautions:  Hx Breast Cancer    NO E-STIM OR ULTRASOUND       Manuals 23   PROM - Mobs right shoulder 10'  10' 10' 10' 10'                           Neuro Re-Ed         LTP/MTP Green x30 ea Green TB 30x ea Green 3x10  Blue 2x10 ea grn 3x10 ea   T-band ER/IR Red IR 3x10  Red ER 3x10 w/ AAROM    Green IR 30x  Yellow ER 30x w/ AAROM Green IR 30x  Yellow ER 30x w/AAROM grn IR x30  Red ER x20 AAROM grn IR x30   Red ER x30 AAROM   T-band Add Green x30 Green TB 30x Green 3x10 grn 3x10 grn 3x10   Shld Isometrics        D2 extension                         Ther Ex        UBE for posture education 8' alt  8' alt 8'  alt 8' alt  8' alt "  nustep L3 15' L5 x 15' L5  15' L5 15' L5 15'   pulleys 30x 30x 30x  5\" 30x 5\"  30x 5\"    Bicep curl 4# 3x10 fadumo 4# 3x10 B/L 4# 3x10 4# 3x10 4# 3x10   Bent over row 4# 3x10 fadumo 4# 3x10 B/L 4#  30x 4# x30 4#  30x   Finger ladder 15x fwd  15x Scap 15x scaption x15 scaption  15x scap   Wall slide                                HEP        Ther Activity                        Gait Training                        Modalities        CP home home 10' post tx Declined  10' post                                  "

## 2023-06-30 ENCOUNTER — OFFICE VISIT (OUTPATIENT)
Dept: PHYSICAL THERAPY | Facility: CLINIC | Age: 83
End: 2023-06-30
Payer: MEDICARE

## 2023-06-30 DIAGNOSIS — M75.121 COMPLETE TEAR OF RIGHT ROTATOR CUFF, UNSPECIFIED WHETHER TRAUMATIC: ICD-10-CM

## 2023-06-30 DIAGNOSIS — M25.511 CHRONIC RIGHT SHOULDER PAIN: Primary | ICD-10-CM

## 2023-06-30 DIAGNOSIS — G89.29 CHRONIC RIGHT SHOULDER PAIN: Primary | ICD-10-CM

## 2023-06-30 PROCEDURE — 97112 NEUROMUSCULAR REEDUCATION: CPT

## 2023-06-30 PROCEDURE — 97140 MANUAL THERAPY 1/> REGIONS: CPT

## 2023-06-30 PROCEDURE — 97110 THERAPEUTIC EXERCISES: CPT

## 2023-06-30 NOTE — PROGRESS NOTES
"Daily Note     Today's date: 2023  Patient name: Ki Verde  : 1940  MRN: [id number]  Referring provider: Barrington Bourne MD  Dx:   Encounter Diagnosis     ICD-10-CM    1  Chronic right shoulder pain  M25 511     G89 29       2  Complete tear of right rotator cuff, unspecified whether traumatic  M75 121           Start Time: 0833          Subjective: Antolin Reardon reports her R shoulder is doing \"Better  \"       Objective: See treatment diary below      Assessment: Flexed elbow to come down from shoulder flexion during PROM resulted in less pain  Visual and VC to ensure correct exercise technique  Able to perform shoulder ER actively this visit  Appropriate fatigue following therapy session  Progress as able  Plan: Continue with current POC to address pt deficits  Precautions:  Hx Breast Cancer    NO E-STIM OR ULTRASOUND       Manuals 23   PROM - Mobs right shoulder 10'  10' 10' 10' 10'                           Neuro Re-Ed         LTP/MTP Green x30 ea grn x30 ea Green 3x10  Blue 2x10 ea grn 3x10 ea   T-band ER/IR Red IR 3x10  Red ER 3x10 w/ AAROM    Red IR 3x10  Red ER 3x10 Green IR 30x  Yellow ER 30x w/AAROM grn IR x30  Red ER x20 AAROM grn IR x30   Red ER x30 AAROM   T-band Add Green x30 grn x30 Green 3x10 grn 3x10 grn 3x10   Shld Isometrics        D2 extension                         Ther Ex        UBE for posture education 8' alt  8' alt  8'  alt 8' alt  8' alt    nustep L3 15' L3 15' L5  15' L5 15' L5 15'   pulleys 30x 30x 30x  5\" 30x 5\"  30x 5\"    Bicep curl 4# 3x10 fadumo 4# 3x10 fadumo  4# 3x10 4# 3x10 4# 3x10   Bent over row 4# 3x10 fadumo 4# 3x10 fadumo  4#  30x 4# x30 4#  30x   Finger ladder 15x fwd  x15 fwd  15x scaption x15 scaption  15x scap   Wall slide                                HEP        Ther Activity                        Gait Training                        Modalities        CP home 10' post tx  10' post tx Declined  10' post                 "

## 2023-07-05 ENCOUNTER — OFFICE VISIT (OUTPATIENT)
Dept: PHYSICAL THERAPY | Facility: CLINIC | Age: 83
End: 2023-07-05
Payer: MEDICARE

## 2023-07-05 DIAGNOSIS — M75.121 COMPLETE TEAR OF RIGHT ROTATOR CUFF, UNSPECIFIED WHETHER TRAUMATIC: ICD-10-CM

## 2023-07-05 DIAGNOSIS — G89.29 CHRONIC RIGHT SHOULDER PAIN: Primary | ICD-10-CM

## 2023-07-05 DIAGNOSIS — M25.511 CHRONIC RIGHT SHOULDER PAIN: Primary | ICD-10-CM

## 2023-07-05 PROCEDURE — 97140 MANUAL THERAPY 1/> REGIONS: CPT

## 2023-07-05 PROCEDURE — 97110 THERAPEUTIC EXERCISES: CPT

## 2023-07-05 NOTE — PROGRESS NOTES
Daily Note     Today's date: 2023  Patient name: Issac Nichols  : 1940  MRN: [id number]  Referring provider: Matt Cox MD  Dx:   Encounter Diagnosis     ICD-10-CM    1. Chronic right shoulder pain  M25.511     G89.29       2. Complete tear of right rotator cuff, unspecified whether traumatic  M75.121                      Subjective: Pt reports that her shoulder is doing well, she has a hard time getting comfortable to fall asleep, but once she is sleeping she has no issues. Pt reports at night she gets pain at the top of her arm and into the bicep. Objective: See treatment diary below      Assessment: Tolerated treatment well. Pt tolerated session well today. Pt reported some discomfort in RUE with finger ladder. Increased difficulty with ER remains in R shoulder. Pt reports that the ache she had in her shoulder last night and this morning improved with exercise and manual tx. Patient would benefit from continued PT      Plan: Progress treatment as tolerated. Precautions:  Hx Breast Cancer.   NO E-STIM OR ULTRASOUND       Manuals 23   PROM - Mobs right shoulder 10'  10' 12' 10' 10'                           Neuro Re-Ed         LTP/MTP Green x30 ea grn x30 ea Green x30 Blue 2x10 ea grn 3x10 ea   T-band ER/IR Red IR 3x10  Red ER 3x10 w/ AAROM    Red IR 3x10  Red ER 3x10 Green IR 30x  Yellow ER 30x w/AAROM grn IR x30  Red ER x20 AAROM grn IR x30   Red ER x30 AAROM   T-band Add Green x30 grn x30 Green 3x10 grn 3x10 grn 3x10   Shld Isometrics        D2 extension                         Ther Ex        UBE for posture education 8' alt  8' alt  8'  alt 8' alt  8' alt    nustep L3 15' L3 15' L6  15' L5 15' L5 15'   pulleys 30x 30x 30x  5" 30x 5"  30x 5"    Bicep curl 4# 3x10 fadumo 4# 3x10 fadumo  4# 3x10 4# 3x10 4# 3x10   Bent over row 4# 3x10 fadumo 4# 3x10 fadumo  4#  30x 4# x30 4#  30x   Finger ladder 15x fwd  x15 fwd  15x scaption x15 scaption  15x scap   Wall slide                                HEP        Ther Activity                        Gait Training                        Modalities        CP home 10' post tx  10' post tx Declined  10' post

## 2023-07-11 ENCOUNTER — OFFICE VISIT (OUTPATIENT)
Dept: PHYSICAL THERAPY | Facility: CLINIC | Age: 83
End: 2023-07-11
Payer: MEDICARE

## 2023-07-11 DIAGNOSIS — M25.511 CHRONIC RIGHT SHOULDER PAIN: Primary | ICD-10-CM

## 2023-07-11 DIAGNOSIS — G89.29 CHRONIC RIGHT SHOULDER PAIN: Primary | ICD-10-CM

## 2023-07-11 DIAGNOSIS — M75.121 COMPLETE TEAR OF RIGHT ROTATOR CUFF, UNSPECIFIED WHETHER TRAUMATIC: ICD-10-CM

## 2023-07-11 PROCEDURE — 97110 THERAPEUTIC EXERCISES: CPT

## 2023-07-11 PROCEDURE — 97140 MANUAL THERAPY 1/> REGIONS: CPT

## 2023-07-11 PROCEDURE — 97112 NEUROMUSCULAR REEDUCATION: CPT

## 2023-07-11 NOTE — PROGRESS NOTES
Daily Note     Today's date: 2023  Patient name: Issac Nichols  : 1940  MRN: [id number]  Referring provider: Matt Cox MD  Dx:   Encounter Diagnosis     ICD-10-CM    1. Chronic right shoulder pain  M25.511     G89.29       2. Complete tear of right rotator cuff, unspecified whether traumatic  M75.121                      Subjective: Bello Damico reports R shoulder  Is "coming along."       Objective: See treatment diary below      Assessment: Visual and verbal cues to ensure correct scap positioning. Crepitus noted during shoulder adduction exercise but pt denied any pain. PROM in all planes to tolerance with limitation in flex/abd due to pain to lat delt. Progress as able. Plan: Continue with current POC to address pt deficits. Precautions:  Hx Breast Cancer.   NO E-STIM OR ULTRASOUND       Manuals 23 7-23   PROM - Mobs right shoulder 10'  10' 12' 12' 10'                           Neuro Re-Ed         LTP/MTP Green x30 ea grn x30 ea Green x30 grn x30 ea grn 3x10 ea   T-band ER/IR Red IR 3x10  Red ER 3x10 w/ AAROM    Red IR 3x10  Red ER 3x10 Green IR 30x  Yellow ER 30x w/AAROM grn IR x30  Yellow ER x30w/AAROM  grn IR x30   Red ER x30 AAROM   T-band Add Green x30 grn x30 Green 3x10 grn 3x10 grn 3x10   Shld Isometrics        D2 extension                         Ther Ex        UBE for posture education 8' alt  8' alt  8'  alt 8' alt  8' alt    nustep L3 15' L3 15' L6  15' Not today-unavail  L5 15'   pulleys 30x 30x 30x  5" 30x 5"  30x 5"    Bicep curl 4# 3x10 fadumo 4# 3x10 fadumo  4# 3x10 4# 3x10 4# 3x10   Bent over row 4# 3x10 fadumo 4# 3x10 fadumo  4#  30x 4# 30x 4#  30x   Finger ladder 15x fwd  x15 fwd  15x scaption 15x scaption 15x scap   Wall slide                                HEP        Ther Activity                        Gait Training                        Modalities        CP home 10' post tx  10' post tx 12' post tx  10' post

## 2023-07-13 ENCOUNTER — APPOINTMENT (OUTPATIENT)
Dept: PHYSICAL THERAPY | Facility: CLINIC | Age: 83
End: 2023-07-13
Payer: MEDICARE

## 2023-07-14 ENCOUNTER — OFFICE VISIT (OUTPATIENT)
Dept: PHYSICAL THERAPY | Facility: CLINIC | Age: 83
End: 2023-07-14
Payer: MEDICARE

## 2023-07-14 DIAGNOSIS — M25.511 CHRONIC RIGHT SHOULDER PAIN: Primary | ICD-10-CM

## 2023-07-14 DIAGNOSIS — M75.121 COMPLETE TEAR OF RIGHT ROTATOR CUFF, UNSPECIFIED WHETHER TRAUMATIC: ICD-10-CM

## 2023-07-14 DIAGNOSIS — G89.29 CHRONIC RIGHT SHOULDER PAIN: Primary | ICD-10-CM

## 2023-07-14 PROCEDURE — 97110 THERAPEUTIC EXERCISES: CPT

## 2023-07-14 PROCEDURE — 97112 NEUROMUSCULAR REEDUCATION: CPT

## 2023-07-14 PROCEDURE — 97140 MANUAL THERAPY 1/> REGIONS: CPT

## 2023-07-14 NOTE — PROGRESS NOTES
Daily Note     Today's date: 2023  Patient name: Chanelle Perales  : 1940  MRN: [id number]  Referring provider: Dimitri Alaniz MD  Dx:   Encounter Diagnosis     ICD-10-CM    1. Chronic right shoulder pain  M25.511     G89.29       2. Complete tear of right rotator cuff, unspecified whether traumatic  M75.121                      Subjective:  I'm doing alright      Objective: See treatment diary below      Assessment: Tolerated treatment well. Patient would benefit from continued PT      Plan: Continue per plan of care. Precautions:  Hx Breast Cancer.   NO E-STIM OR ULTRASOUND       Manuals 23 7--23   PROM - Mobs right shoulder 10'  10' 12' 12' 10'                           Neuro Re-Ed         LTP/MTP Green x30 ea grn x30 ea Green x30 grn x30 ea grn 3x10 ea   T-band ER/IR Red IR 3x10  Red ER 3x10 w/ AAROM    Red IR 3x10  Red ER 3x10 Green IR 30x  Yellow ER 30x w/AAROM grn IR x30  Yellow ER x30w/AAROM  grn IR x30  grn ER 30x AAROM   T-band Add Green x30 grn x30 Green 3x10 grn 3x10 grn 3x10   Shld Isometrics        D2 extension                         Ther Ex        UBE for posture education 8' alt  8' alt  8'  alt 8' alt  8' alt    nustep L3 15' L3 15' L6  15' Not today-unavail  L5 15'   pulleys 30x 30x 30x  5" 30x 5"  30x 5"    Bicep curl 4# 3x10 fadumo 4# 3x10 fadumo  4# 3x10 4# 3x10 4# 3x10   Bent over row 4# 3x10 fadumo 4# 3x10 fadumo  4#  30x 4# 30x 4#  30x   Finger ladder 15x fwd  x15 fwd  15x scaption 15x scaption 15x scap   Wall slide                                HEP        Ther Activity                        Gait Training                        Modalities        CP home 10' post tx  10' post tx 12' post tx

## 2023-07-18 ENCOUNTER — OFFICE VISIT (OUTPATIENT)
Dept: PHYSICAL THERAPY | Facility: CLINIC | Age: 83
End: 2023-07-18
Payer: MEDICARE

## 2023-07-18 DIAGNOSIS — G89.29 CHRONIC RIGHT SHOULDER PAIN: Primary | ICD-10-CM

## 2023-07-18 DIAGNOSIS — M75.121 COMPLETE TEAR OF RIGHT ROTATOR CUFF, UNSPECIFIED WHETHER TRAUMATIC: ICD-10-CM

## 2023-07-18 DIAGNOSIS — M25.511 CHRONIC RIGHT SHOULDER PAIN: Primary | ICD-10-CM

## 2023-07-18 PROCEDURE — 97140 MANUAL THERAPY 1/> REGIONS: CPT

## 2023-07-18 PROCEDURE — 97112 NEUROMUSCULAR REEDUCATION: CPT

## 2023-07-18 PROCEDURE — 97110 THERAPEUTIC EXERCISES: CPT

## 2023-07-18 NOTE — PROGRESS NOTES
Daily Note     Today's date: 2023  Patient name: Hina Doctor  : 1940  MRN: [id number]  Referring provider: Ken Gomez MD  Dx:   Encounter Diagnosis     ICD-10-CM    1. Chronic right shoulder pain  M25.511     G89.29       2. Complete tear of right rotator cuff, unspecified whether traumatic  M75.121                      Subjective: The shoulder is ok. It feels tight today      Objective: See treatment diary below      Assessment: Tolerated treatment well. Continued weakness noted with elevation of UE. Intermittent pain noted with symptoms worsening with lower of UE from elevated position. Wall slides performed and less pain noted than prior sessions. Patient would benefit from continued PT      Plan: Continue per plan of care. Precautions:  Hx Breast Cancer.   NO E-STIM OR ULTRASOUND       Manuals 23   PROM - Mobs right shoulder 10'  10' 12' 12' 10'                           Neuro Re-Ed         LTP/MTP Green x30 ea grn x30 ea Green x30 grn x30 ea grn 3x10 ea   T-band ER/IR Green IR 30x  Green ER 30x AAROM   Red IR 3x10  Red ER 3x10 Green IR 30x  Yellow ER 30x w/AAROM grn IR x30  Yellow ER x30w/AAROM  grn IR x30  grn ER 30x AAROM   T-band Add Green x30 grn x30 Green 3x10 grn 3x10 grn 3x10   Shld Isometrics        D2 extension                         Ther Ex        UBE for posture education 8' alt  8' alt  8'  alt 8' alt  8' alt    nustep L5 15' L3 15' L6  15' Not today-unavail  L5 15'   pulleys 30x 30x 30x  5" 30x 5"  30x 5"    Bicep curl 4# 3x10 fadumo 4# 3x10 fadumo  4# 3x10 4# 3x10 4# 3x10   Bent over row 4# 3x10 fadumo 4# 3x10 fadumo  4#  30x 4# 30x 4#  30x   Finger ladder 15x fwd  x15 fwd  15x scaption 15x scaption 15x scap   Wall slide 10x  W/ LUE assist                               HEP        Ther Activity                        Gait Training                        Modalities        CP 10'   10' post tx  10' post tx 12' post tx

## 2023-07-20 ENCOUNTER — OFFICE VISIT (OUTPATIENT)
Dept: PHYSICAL THERAPY | Facility: CLINIC | Age: 83
End: 2023-07-20
Payer: MEDICARE

## 2023-07-20 DIAGNOSIS — G89.29 CHRONIC RIGHT SHOULDER PAIN: Primary | ICD-10-CM

## 2023-07-20 DIAGNOSIS — M25.511 CHRONIC RIGHT SHOULDER PAIN: Primary | ICD-10-CM

## 2023-07-20 DIAGNOSIS — M75.121 COMPLETE TEAR OF RIGHT ROTATOR CUFF, UNSPECIFIED WHETHER TRAUMATIC: ICD-10-CM

## 2023-07-20 PROCEDURE — 97140 MANUAL THERAPY 1/> REGIONS: CPT

## 2023-07-20 PROCEDURE — 97112 NEUROMUSCULAR REEDUCATION: CPT

## 2023-07-20 PROCEDURE — 97110 THERAPEUTIC EXERCISES: CPT

## 2023-07-20 NOTE — PROGRESS NOTES
Daily Note     Today's date: 2023  Patient name: Shahrzad Jha  : 1940  MRN: [id number]  Referring provider: Niurka Panchal MD  Dx:   Encounter Diagnosis     ICD-10-CM    1. Chronic right shoulder pain  M25.511     G89.29       2. Complete tear of right rotator cuff, unspecified whether traumatic  M75.121                      Subjective: The shoulder has been feeling good the past 2 days      Objective: See treatment diary below      Assessment: Tolerated treatment well. Reports decreased overall symptoms right shoulder the past 2 days. Reports wall slide felt easier. Added table push up with no pain. Feels shoulder is continuing to improve. Patient would benefit from continued PT      Plan: Continue per plan of care. Precautions:  Hx Breast Cancer.   NO E-STIM OR ULTRASOUND       Manuals 23   PROM - Mobs right shoulder 10'  10' 12' 12' 10'                           Neuro Re-Ed         LTP/MTP Green x30 ea grn x30 ea Green x30 grn x30 ea grn 3x10 ea   T-band ER/IR Green IR 30x  Green ER 30x AAROM   Green IR 30x  Green ER 30x AAROM Green IR 30x  Yellow ER 30x w/AAROM grn IR x30  Yellow ER x30w/AAROM  grn IR x30  grn ER 30x AAROM   T-band Add Green x30 grn x30 Green 3x10 grn 3x10 grn 3x10   Shld Isometrics        D2 extension                         Ther Ex        UBE for posture education 8' alt  8' alt  8'  alt 8' alt  8' alt    nustep L5 15' L5 15' L6  15' Not today-unavail  L5 15'   pulleys 30x 30x 30x  5" 30x 5"  30x 5"    Bicep curl 4# 3x10 fadumo 4# 3x10 fadumo  4# 3x10 4# 3x10 4# 3x10   Bent over row 4# 3x10 fadumo 4# 3x10 fadumo  4#  30x 4# 30x 4#  30x   Finger ladder 15x fwd  x15 fwd  15x scaption 15x scaption 15x scap   Wall slide 10x  W/ LUE assist 15x w/ LUE assist       Stand table push up  2x10                      HEP        Ther Activity                        Gait Training                        Modalities        CP 10'    10' post tx 12' post tx

## 2023-07-26 ENCOUNTER — OFFICE VISIT (OUTPATIENT)
Dept: PHYSICAL THERAPY | Facility: CLINIC | Age: 83
End: 2023-07-26
Payer: MEDICARE

## 2023-07-26 DIAGNOSIS — M75.121 COMPLETE TEAR OF RIGHT ROTATOR CUFF, UNSPECIFIED WHETHER TRAUMATIC: ICD-10-CM

## 2023-07-26 DIAGNOSIS — G89.29 CHRONIC RIGHT SHOULDER PAIN: Primary | ICD-10-CM

## 2023-07-26 DIAGNOSIS — M25.511 CHRONIC RIGHT SHOULDER PAIN: Primary | ICD-10-CM

## 2023-07-26 PROCEDURE — 97112 NEUROMUSCULAR REEDUCATION: CPT

## 2023-07-26 PROCEDURE — 97110 THERAPEUTIC EXERCISES: CPT

## 2023-07-26 PROCEDURE — 97140 MANUAL THERAPY 1/> REGIONS: CPT

## 2023-07-26 NOTE — PROGRESS NOTES
Daily Note     Today's date: 2023  Patient name: Francis Maloney  : 1940  MRN: [id number]  Referring provider: Antony Joshi MD  Dx:   Encounter Diagnosis     ICD-10-CM    1. Chronic right shoulder pain  M25.511     G89.29       2. Complete tear of right rotator cuff, unspecified whether traumatic  M75.121                      Subjective: The shoulder is feeling stronger. Objective: See treatment diary below      Assessment: Tolerated treatment well. Reports shoulder and UE feeling stronger with PT. Continued diffiuclty with elevation above shoulder level or lifting with UE extended. Patient would benefit from continued PT      Plan: Continue per plan of care. Precautions:  Hx Breast Cancer.   NO E-STIM OR ULTRASOUND       Manuals 23   PROM - Mobs right shoulder 10'  10' 10' 12' 10'                           Neuro Re-Ed         LTP/MTP Green x30 ea grn x30 ea Green x30 grn x30 ea grn 3x10 ea   T-band ER/IR Green IR 30x  Green ER 30x AAROM   Green IR 30x  Green ER 30x AAROM Green IR 30x  Green ER 30x  AAROM grn IR x30  Yellow ER x30w/AAROM  grn IR x30  grn ER 30x AAROM   T-band Add Green x30 grn x30 Green 3x10 grn 3x10 grn 3x10   Shld Isometrics        D2 extension                         Ther Ex        UBE for posture education 8' alt  8' alt  8'  alt 8' alt  8' alt    nustep L5 15' L5 15' L5  15' Not today-unavail  L5 15'   pulleys 30x 30x 30x  5" 30x 5"  30x 5"    Bicep curl 4# 3x10 fadumo 4# 3x10 fadumo  4# 3x10 4# 3x10 4# 3x10   Bent over row 4# 3x10 fadumo 4# 3x10 fadumo  4#  30x 4# 30x 4#  30x   Finger ladder 15x fwd  x15 fwd  15x scaption 15x scaption 15x scap   Wall slide 10x  W/ LUE assist 15x w/ LUE assist  15x w/ LUE assist     Stand table push up  2x10 3x10                     HEP        Ther Activity                        Gait Training                        Modalities        CP 10'    5' post tx 12' post tx

## 2023-07-28 ENCOUNTER — OFFICE VISIT (OUTPATIENT)
Dept: PHYSICAL THERAPY | Facility: CLINIC | Age: 83
End: 2023-07-28
Payer: MEDICARE

## 2023-07-28 DIAGNOSIS — G89.29 CHRONIC RIGHT SHOULDER PAIN: Primary | ICD-10-CM

## 2023-07-28 DIAGNOSIS — M25.511 CHRONIC RIGHT SHOULDER PAIN: Primary | ICD-10-CM

## 2023-07-28 DIAGNOSIS — M75.121 COMPLETE TEAR OF RIGHT ROTATOR CUFF, UNSPECIFIED WHETHER TRAUMATIC: ICD-10-CM

## 2023-07-28 PROCEDURE — 97112 NEUROMUSCULAR REEDUCATION: CPT

## 2023-07-28 PROCEDURE — 97140 MANUAL THERAPY 1/> REGIONS: CPT

## 2023-07-28 PROCEDURE — 97110 THERAPEUTIC EXERCISES: CPT

## 2023-07-28 NOTE — PROGRESS NOTES
Daily Note     Today's date: 2023  Patient name: Emerson Roque  : 1940  MRN: [id number]  Referring provider: Milena Auguste MD  Dx:   Encounter Diagnosis     ICD-10-CM    1. Chronic right shoulder pain  M25.511     G89.29       2. Complete tear of right rotator cuff, unspecified whether traumatic  M75.121                      Subjective:   I'm ok today      Objective: See treatment diary below      Assessment: Tolerated treatment well. Reports less overall pain as well as episodes of sharp pain. Feels strength improving. Patient would benefit from continued PT      Plan: Continue per plan of care. Precautions:  Hx Breast Cancer.   NO E-STIM OR ULTRASOUND       Manuals 23   PROM - Mobs right shoulder 10'  10' 10' 10' 10'                           Neuro Re-Ed         LTP/MTP Green x30 ea grn x30 ea Green x30 grn x30 ea grn 3x10 ea   T-band ER/IR Green IR 30x  Green ER 30x AAROM   Green IR 30x  Green ER 30x AAROM Green IR 30x  Green ER 30x  AAROM Green IR 30x Green ER 30x AAROM  grn IR x30  grn ER 30x AAROM   T-band Add Green x30 grn x30 Green 3x10 grn 3x10 grn 3x10   Shld Isometrics        D2 extension                         Ther Ex        UBE for posture education 8' alt  8' alt  8'  alt 8' alt  8' alt    nustep L5 15' L5 15' L5  15' L5  15' L5 15'   pulleys 30x 30x 30x  5" 30x 5"  30x 5"    Bicep curl 4# 3x10 fadumo 4# 3x10 fadumo  4# 3x10 4# 3x10 4# 3x10   Bent over row 4# 3x10 fadumo 4# 3x10 fadumo  4#  30x 4# 30x 4#  30x   Finger ladder 15x fwd  x15 fwd  15x scaption 15x scaption 15x scap   Wall slide 10x  W/ LUE assist 15x w/ LUE assist  15x w/ LUE assist 15x  W/ LUE assist     Stand table push up  2x10 3x10 3x10                     HEP        Ther Activity                        Gait Training                        Modalities        CP 10'    5' post tx 5'

## 2023-08-02 ENCOUNTER — OFFICE VISIT (OUTPATIENT)
Dept: PHYSICAL THERAPY | Facility: CLINIC | Age: 83
End: 2023-08-02
Payer: MEDICARE

## 2023-08-02 DIAGNOSIS — G89.29 CHRONIC RIGHT SHOULDER PAIN: Primary | ICD-10-CM

## 2023-08-02 DIAGNOSIS — M25.511 CHRONIC RIGHT SHOULDER PAIN: Primary | ICD-10-CM

## 2023-08-02 DIAGNOSIS — M75.121 COMPLETE TEAR OF RIGHT ROTATOR CUFF, UNSPECIFIED WHETHER TRAUMATIC: ICD-10-CM

## 2023-08-02 PROCEDURE — 97140 MANUAL THERAPY 1/> REGIONS: CPT

## 2023-08-02 PROCEDURE — 97110 THERAPEUTIC EXERCISES: CPT

## 2023-08-02 NOTE — PROGRESS NOTES
Daily Note     Today's date: 2023  Patient name: Eulogio Boone  : 1940  MRN: [id number]  Referring provider: Mo Sousa MD  Dx:   Encounter Diagnosis     ICD-10-CM    1. Chronic right shoulder pain  M25.511     G89.29       2. Complete tear of right rotator cuff, unspecified whether traumatic  M75.121                      Subjective: Pt reports that her right shoulder is doing better overall, she is using it less than she used to and learning to do more with her left arm. Objective: See treatment diary below      Assessment: Tolerated treatment well. Increased difficulty and poor body mechanics with ER of right shoulder, decreased to red tband and pt used AAROM for this. Pt reported some soreness during wall slides and post exercise that slightly improved with CP. Patient would benefit from continued PT      Plan: Progress treatment as tolerated. Precautions:  Hx Breast Cancer.   NO E-STIM OR ULTRASOUND       Manuals 23   PROM - Mobs right shoulder 10'  10' 10' 10' 10'                           Neuro Re-Ed         LTP/MTP Green x30 ea grn x30 ea Green x30 grn x30 ea grn 3x10 ea   T-band ER/IR Green IR 30x  Green ER 30x AAROM   Green IR 30x  Green ER 30x AAROM Green IR 30x  Green ER 30x  AAROM Green IR 30x Green ER 30x AAROM  red IR x30  grn ER 30x AAROM   T-band Add Green x30 grn x30 Green 3x10 grn 3x10 grn 3x10   Shld Isometrics        D2 extension                         Ther Ex        UBE for posture education 8' alt  8' alt  8'  alt 8' alt  8' alt    nustep L5 15' L5 15' L5  15' L5  15' L5 15'   pulleys 30x 30x 30x  5" 30x 5"  30x 5"    Bicep curl 4# 3x10 fadumo 4# 3x10 fadumo  4# 3x10 4# 3x10 4# 3x10   Bent over row 4# 3x10 fadumo 4# 3x10 fadumo  4#  30x 4# 30x 4#  30x   Finger ladder 15x fwd  x15 fwd  15x scaption 15x scaption 15x scap   Wall slide 10x  W/ LUE assist 15x w/ LUE assist  15x w/ LUE assist 15x  W/ LUE assist  15x  W/ LUE assist    Stand table push up  2x10 3x10 3x10  3x10                   HEP        Ther Activity                        Gait Training                        Modalities        CP 10'    5' post tx 5'         10' post session

## 2023-08-04 ENCOUNTER — OFFICE VISIT (OUTPATIENT)
Dept: PHYSICAL THERAPY | Facility: CLINIC | Age: 83
End: 2023-08-04
Payer: MEDICARE

## 2023-08-04 DIAGNOSIS — M75.121 COMPLETE TEAR OF RIGHT ROTATOR CUFF, UNSPECIFIED WHETHER TRAUMATIC: ICD-10-CM

## 2023-08-04 DIAGNOSIS — M25.511 CHRONIC RIGHT SHOULDER PAIN: Primary | ICD-10-CM

## 2023-08-04 DIAGNOSIS — G89.29 CHRONIC RIGHT SHOULDER PAIN: Primary | ICD-10-CM

## 2023-08-04 PROCEDURE — 97140 MANUAL THERAPY 1/> REGIONS: CPT

## 2023-08-04 PROCEDURE — 97110 THERAPEUTIC EXERCISES: CPT

## 2023-08-04 PROCEDURE — 97112 NEUROMUSCULAR REEDUCATION: CPT

## 2023-08-04 NOTE — PROGRESS NOTES
Daily Note     Today's date: 2023  Patient name: Braxton Casey  : 1940  MRN: [id number]  Referring provider: Ángela Reyes MD  Dx:   Encounter Diagnosis     ICD-10-CM    1. Chronic right shoulder pain  M25.511     G89.29       2. Complete tear of right rotator cuff, unspecified whether traumatic  M75.121                      Subjective:  I'm doing alright      Objective: See treatment diary below      Assessment: Tolerated treatment well. Reports shoulder was feeling very good yesterday until she reached for something with increased symptoms afterwards. No pain noted with TE. Tightness continues moving into ER with manual therapy in anterior shoulder region. Patient would benefit from continued PT      Plan: Continue per plan of care. Precautions:  Hx Breast Cancer.   NO E-STIM OR ULTRASOUND       Manuals 23   PROM - Mobs right shoulder 10'  10' 10' 10' 10'                           Neuro Re-Ed         LTP/MTP Green x30 ea grn x30 ea Green x30 grn x30 ea grn 3x10 ea   T-band ER/IR Green IR 30x  Green ER 30x AAROM   Green IR 30x  Green ER 30x AAROM Green IR 30x  Green ER 30x  AAROM Green IR 30x Green ER 30x AAROM  red IR x30  grn ER 30x AAROM   T-band Add Green x30 grn x30 Green 3x10 grn 3x10 grn 3x10   Shld Isometrics        D2 extension                         Ther Ex        UBE for posture education 8' alt  8' alt  8'  alt 8' alt  8' alt    nustep L5 15' L5 15' L5  15' L5  15' L5 15'   pulleys 30x 30x 30x  5" 30x 5"  30x 5"    Bicep curl 4# 3x10 fadumo 4# 3x10 fadumo  4# 3x10 4# 3x10 4# 3x10   Bent over row 4# 3x10 fadumo 4# 3x10 fadumo  4#  30x 4# 30x 4#  30x   Finger ladder 15x fwd  x15 fwd  15x scaption 15x scaption 15x scap   Wall slide 10x  W/ LUE assist 15x w/ LUE assist  15x w/ LUE assist 15x  W/ LUE assist  15x  W/ LUE assist    Stand table push up 3x10  2x10 3x10 3x10  3x10                   HEP        Ther Activity                        Gait Training Modalities        CP   5' post tx 5'         10' post session

## 2023-08-08 ENCOUNTER — OFFICE VISIT (OUTPATIENT)
Dept: PHYSICAL THERAPY | Facility: CLINIC | Age: 83
End: 2023-08-08
Payer: MEDICARE

## 2023-08-08 DIAGNOSIS — M75.121 COMPLETE TEAR OF RIGHT ROTATOR CUFF, UNSPECIFIED WHETHER TRAUMATIC: ICD-10-CM

## 2023-08-08 DIAGNOSIS — M25.511 CHRONIC RIGHT SHOULDER PAIN: Primary | ICD-10-CM

## 2023-08-08 DIAGNOSIS — G89.29 CHRONIC RIGHT SHOULDER PAIN: Primary | ICD-10-CM

## 2023-08-08 PROCEDURE — 97140 MANUAL THERAPY 1/> REGIONS: CPT

## 2023-08-08 PROCEDURE — 97110 THERAPEUTIC EXERCISES: CPT

## 2023-08-08 NOTE — PROGRESS NOTES
Daily Note     Today's date: 2023  Patient name: Candace Kapoor  : 1940  MRN: [id number]  Referring provider: Laura Lopez MD  Dx:   Encounter Diagnosis     ICD-10-CM    1. Chronic right shoulder pain  M25.511     G89.29       2. Complete tear of right rotator cuff, unspecified whether traumatic  M75.121                      Subjective: Pt reports that she has some soreness in her right upper arm but the shoulder feels good. Objective: See treatment diary below      Assessment: Tolerated treatment well. Pt tolerated session well. Reports of catching in right shoulder when bringing arm out of flexion during PROM. Pt demonstrates continued difficulty with ER and flexion of RUE. Pt reports improved symptoms after manual PROM and rhythmic oscillations of R shoulder. Patient would benefit from continued PT      Plan: Progress treatment as tolerated. Precautions:  Hx Breast Cancer.   NO E-STIM OR ULTRASOUND       Manuals 23   PROM - Mobs right shoulder 10'  10' 10' 10' 10'                           Neuro Re-Ed         LTP/MTP Green x30 ea grn x30 ea Green x30 grn x30 ea grn 3x10 ea   T-band ER/IR Green IR 30x  Green ER 30x AAROM   Green IR 30x  Green ER 30x AAROM Green IR 30x  Green ER 30x  AAROM Green IR 30x Green ER 30x AAROM  red IR x30  grn ER 30x AAROM   T-band Add Green x30 grn x30 Green 3x10 grn 3x10 grn 3x10   Shld Isometrics        D2 extension                         Ther Ex        UBE for posture education 8' alt  8' alt  8'  alt 8' alt  8' alt    nustep L5 15' L5 15' L5  15' L5  15' L5 15'   pulleys 30x 30x 30x  5" 30x 5"  30x 5"    Bicep curl 4# 3x10 fadumo 4# 3x10 fadumo  4# 3x10 4# 3x10 4# 3x10   Bent over row 4# 3x10 fadumo 4# 3x10 fadumo  4#  30x 4# 30x 4#  30x   Finger ladder 15x fwd  x15 fwd  15x scaption 15x scaption 15x scap   Wall slide 10x  W/ LUE assist 15x w/ LUE assist  15x w/ LUE assist 15x  W/ LUE assist  15x  W/ LUE assist    Stand table push up 3x10  3x10 3x10 3x10  3x10                   HEP        Ther Activity                        Gait Training                        Modalities        CP  11' post session R shoulder  5' post tx 5'         10' post session

## 2023-08-10 ENCOUNTER — OFFICE VISIT (OUTPATIENT)
Dept: PHYSICAL THERAPY | Facility: CLINIC | Age: 83
End: 2023-08-10
Payer: MEDICARE

## 2023-08-10 DIAGNOSIS — M25.511 CHRONIC RIGHT SHOULDER PAIN: Primary | ICD-10-CM

## 2023-08-10 DIAGNOSIS — G89.29 CHRONIC RIGHT SHOULDER PAIN: Primary | ICD-10-CM

## 2023-08-10 DIAGNOSIS — M75.121 COMPLETE TEAR OF RIGHT ROTATOR CUFF, UNSPECIFIED WHETHER TRAUMATIC: ICD-10-CM

## 2023-08-10 PROCEDURE — 97140 MANUAL THERAPY 1/> REGIONS: CPT

## 2023-08-10 PROCEDURE — 97110 THERAPEUTIC EXERCISES: CPT

## 2023-08-10 NOTE — PROGRESS NOTES
Daily Note     Today's date: 8/10/2023  Patient name: Shahrzad Jha  : 1940  MRN: [id number]  Referring provider: Niurka Panchal MD  Dx:   Encounter Diagnosis     ICD-10-CM    1. Chronic right shoulder pain  M25.511     G89.29       2. Complete tear of right rotator cuff, unspecified whether traumatic  M75.121                      Subjective: Pt reports that she is feeling well today, shoulder does not hurt. Objective: See treatment diary below      Assessment: Tolerated treatment well. Pt reports improved symptoms post manual PROM. No reports of pain or discomfort throughout the session. Patient would benefit from continued PT      Plan: Progress treatment as tolerated. Precautions:  Hx Breast Cancer.   NO E-STIM OR ULTRASOUND       Manuals 8/4/23 8-8-23 8-10-23 7/28/23 8-2-23   PROM - Mobs right shoulder 10'  10' 10' 10' 10'                           Neuro Re-Ed         LTP/MTP Green x30 ea grn x30 ea Green x30 grn x30 ea grn 3x10 ea   T-band ER/IR Green IR 30x  Green ER 30x AAROM   Green IR 30x  Green ER 30x AAROM Green IR 30x  Green ER 30x  AAROM Green IR 30x Green ER 30x AAROM  red IR x30  grn ER 30x AAROM   T-band Add Green x30 grn x30 Green 3x10 grn 3x10 grn 3x10   Shld Isometrics        D2 extension                         Ther Ex        UBE for posture education 8' alt  8' alt  8'  alt 8' alt  8' alt    nustep L5 15' L5 15' L5  15' L5  15' L5 15'   pulleys 30x 30x 30x  5" 30x 5"  30x 5"    Bicep curl 4# 3x10 fadumo 4# 3x10 fadumo  4# 3x10 4# 3x10 4# 3x10   Bent over row 4# 3x10 fadumo 4# 3x10 fadumo  4#  30x 4# 30x 4#  30x   Finger ladder 15x fwd  x15 fwd  15x scaption 15x scaption 15x scap   Wall slide 10x  W/ LUE assist 15x w/ LUE assist  15x w/ LUE assist 15x  W/ LUE assist  15x  W/ LUE assist    Stand table push up 3x10  3x10 3x10 3x10  3x10                   HEP        Ther Activity                        Gait Training                        Modalities        CP  11' post session R shoulder  12' post tx R shld 5'         10' post session

## 2023-08-14 NOTE — PROGRESS NOTES
PT RE-EVALUATION     Today's date: 8/15/2023  Patient name: Karon Boles  : 1940  MRN: [id number]  Referring provider: Anival Woodruff MD  Dx:   Encounter Diagnosis     ICD-10-CM    1. Chronic right shoulder pain  M25.511     G89.29       2. Complete tear of right rotator cuff, unspecified whether traumatic  M75.121                      Assessment  Assessment details: Pt presented with chronic right shoulder pain and progressive loss of function. Presently reports she is unable to elevate UE without assist from LUE. Reports constant pain. Right hand dominant. Reports all ADL's limited. PT notes poor strength and significant limitations in ROM. Recent Ortho MD consult and reports MD feels she has RTC tear. No MRI performed. 8-15-23:  Pt reports she continues to feel improvement in functional levels with use of right shoulder. Reports continued varied pain levels primarily in right upper arm. Reports sharp pain that resolves quickly with positional changes. Pain primarily with reaching/lfiting. Does continue to report weakness and can only lift light objects with RUE. Does report requiring rest periods with activity due to soreness shoulder/upper arm. Rest does help resolve symptoms. No significant changes noted in AROM. Feels continued improvement with PT tx. Will benefit from continued PT tx to decrease symptoms and improve functional level. Impairments: abnormal or restricted ROM, activity intolerance, impaired physical strength, lacks appropriate home exercise program and pain with function     Prognosis: fair    Goals  ST. Decrease pain 25% 6 wk  2. Increase shoulder AROM by 30-40 degrees 6 wk  3. Increase shoulder strength to 3+/5 6 wk  4. Pt will report no difficulty with activity below shoulder level 6 wk   (met/partially met)  LT. Pt will report 50% decrease in pain 12 wk  2. Increase shoulder AROM to [facility] 12 wk  3.   Increase shoulder strength to [facility] 12 wk  4.  Pt will report no limitations with ADL's 12 wk (partially met)    Plan  Patient would benefit from: PT eval and skilled physical therapy  Planned modality interventions: cryotherapy and thermotherapy: hydrocollator packs  Planned therapy interventions: joint mobilization, manual therapy, neuromuscular re-education, strengthening, stretching, therapeutic activities, therapeutic exercise, flexibility, functional ROM exercises, home exercise program and postural training  Frequency: 3x week  Duration in weeks: 12  Treatment plan discussed with: patient        Subjective Evaluation    History of Present Illness  Mechanism of injury: Pt presents with right shoulder pain. Reports Ortho MD diagnosed her with right RTC tear. Unable to elevate RUE without assist from LUE. Inability to elevate RUE for the past 6 months. Pain intermittent in shoulder but constant pain in upper arm between shoulder and elbow. Reports intermittent altered sensation right thumb/wrist.  Reports crepitus in shoulder. No MRI to date. Had one injection which did not help symptoms.     Patient Goals  Patient goals for therapy: decreased pain, increased motion, increased strength and independence with ADLs/IADLs    Pain  Current pain ratin  At best pain ratin  At worst pain ratin  Location: Right shoulder/upper arm  Quality: sharp  Relieving factors: rest  Aggravating factors: lifting  Progression: improved    Hand dominance: right    Treatments  Current treatment: physical therapy        Objective     Postural Observations    Additional Postural Observation Details  Forward head  Forward rounded shoulders    Tenderness     Right Shoulder  No tenderness     Active Range of Motion     Right Shoulder   Flexion: 110 degrees   Abduction: 105 degrees   External rotation BTH: C3   Internal rotation BTB: L1     Passive Range of Motion     Right Shoulder   Flexion: WFL  Abduction: [facility]  Internal rotation 90°: [facility]    Strength/Myotome Testing     Left Shoulder     Isolated Muscles   Upper trapezius: 4     Right Shoulder     Planes of Motion   Flexion: 3+   Abduction: 3+   External rotation at 0°: 2   Internal rotation at 0°: 4-     Isolated Muscles   Biceps: 4   Triceps: 4-   Upper trapezius: 4     Additional Strength Details  MMT in available ROM    Tests     Right Shoulder   Positive empty can. Precautions:  Hx Breast Cancer.   NO E-STIM OR ULTRASOUND       Manuals 8/4/23 8-8-23 8-10-23 8/15/23 8-2-23   PROM - Mobs right shoulder 10'  10' 10' 10' 10'                           Neuro Re-Ed         LTP/MTP Green x30 ea grn x30 ea Green x30 grn x30 ea grn 3x10 ea   T-band ER/IR Green IR 30x  Green ER 30x AAROM   Green IR 30x  Green ER 30x AAROM Green IR 30x  Green ER 30x  AAROM Green IR 30x Green ER 30x AAROM  red IR x30  grn ER 30x AAROM   T-band Add Green x30 grn x30 Green 3x10 grn 3x10 grn 3x10   T-band punches    Green 20x    D2 extension                         Ther Ex        UBE for posture education 8' alt  8' alt  8'  alt 8' alt  8' alt    nustep L5 15' L5 15' L5  15' L5  15' L5 15'   pulleys 30x 30x 30x  5" 30x 5"  30x 5"    Bicep curl 4# 3x10 fadumo 4# 3x10 fadumo  4# 3x10 4# 3x10 4# 3x10   Bent over row 4# 3x10 fadumo 4# 3x10 fadumo  4#  30x 4# 30x 4#  30x   Finger ladder 15x fwd  x15 fwd  15x scaption 15x scaption 15x scap   Wall slide 10x  W/ LUE assist 15x w/ LUE assist  15x w/ LUE assist 15x  W/ LUE assist  15x  W/ LUE assist    Stand table push up 3x10  3x10 3x10 3x10  3x10                   HEP        Ther Activity                        Gait Training                        Modalities        CP  11' post session R shoulder  12' post tx R shld Declined          10' post session

## 2023-08-15 ENCOUNTER — OFFICE VISIT (OUTPATIENT)
Dept: PHYSICAL THERAPY | Facility: CLINIC | Age: 83
End: 2023-08-15
Payer: MEDICARE

## 2023-08-15 DIAGNOSIS — M75.121 COMPLETE TEAR OF RIGHT ROTATOR CUFF, UNSPECIFIED WHETHER TRAUMATIC: ICD-10-CM

## 2023-08-15 DIAGNOSIS — M25.511 CHRONIC RIGHT SHOULDER PAIN: Primary | ICD-10-CM

## 2023-08-15 DIAGNOSIS — G89.29 CHRONIC RIGHT SHOULDER PAIN: Primary | ICD-10-CM

## 2023-08-15 PROCEDURE — 97140 MANUAL THERAPY 1/> REGIONS: CPT

## 2023-08-15 PROCEDURE — 97110 THERAPEUTIC EXERCISES: CPT

## 2023-08-15 PROCEDURE — 97112 NEUROMUSCULAR REEDUCATION: CPT

## 2023-08-15 NOTE — LETTER
August 15, 2023    Tyrus Lesches, MD  [address] [address]    Patient: Jodi Chavez   YOB: 1940   Date of Visit: 8/15/2023     Encounter Diagnosis     ICD-10-CM    1. Chronic right shoulder pain  M25.511     G89.29       2. Complete tear of right rotator cuff, unspecified whether traumatic  M75.121           Dear Dr. Marcelo End:    Thank you for your recent referral of Jodi Chavez. Please review the attached evaluation summary from Lori's recent visit. Please verify that you agree with the plan of care by signing the attached order. If you have any questions or concerns, please do not hesitate to call. I sincerely appreciate the opportunity to share in the care of one of your patients and hope to have another opportunity to work with you in the near future. Sincerely,    Irene Boo, PT      Referring Provider:      I certify that I have read the below Plan of Care and certify the need for these services furnished under this plan of treatment while under my care. Tyrus Lesches, MD  [address] [address]  Via Fax: [phone]          PT RE-EVALUATION     Today's date: 8/15/2023  Patient name: Jodi Chavez  : 1940  MRN: [id number]  Referring provider: Tyrus Lesches, MD  Dx:   Encounter Diagnosis     ICD-10-CM    1. Chronic right shoulder pain  M25.511     G89.29       2. Complete tear of right rotator cuff, unspecified whether traumatic  M75.121                      Assessment  Assessment details: Pt presented with chronic right shoulder pain and progressive loss of function. Presently reports she is unable to elevate UE without assist from LUE. Reports constant pain. Right hand dominant. Reports all ADL's limited. PT notes poor strength and significant limitations in ROM. Recent Ortho MD consult and reports MD feels she has RTC tear. No MRI performed.       8-15-23:  Pt reports she continues to feel improvement in functional levels with use of right shoulder. Reports continued varied pain levels primarily in right upper arm. Reports sharp pain that resolves quickly with positional changes. Pain primarily with reaching/lfiting. Does continue to report weakness and can only lift light objects with RUE. Does report requiring rest periods with activity due to soreness shoulder/upper arm. Rest does help resolve symptoms. No significant changes noted in AROM. Feels continued improvement with PT tx. Will benefit from continued PT tx to decrease symptoms and improve functional level. Impairments: abnormal or restricted ROM, activity intolerance, impaired physical strength, lacks appropriate home exercise program and pain with function     Prognosis: fair    Goals  ST. Decrease pain 25% 6 wk  2. Increase shoulder AROM by 30-40 degrees 6 wk  3. Increase shoulder strength to 3+/5 6 wk  4. Pt will report no difficulty with activity below shoulder level 6 wk   (met/partially met)  LT. Pt will report 50% decrease in pain 12 wk  2. Increase shoulder AROM to [facility] 12 wk  3. Increase shoulder strength to [facility] 12 wk  4. Pt will report no limitations with ADL's 12 wk (partially met)    Plan  Patient would benefit from: PT eval and skilled physical therapy  Planned modality interventions: cryotherapy and thermotherapy: hydrocollator packs  Planned therapy interventions: joint mobilization, manual therapy, neuromuscular re-education, strengthening, stretching, therapeutic activities, therapeutic exercise, flexibility, functional ROM exercises, home exercise program and postural training  Frequency: 3x week  Duration in weeks: 12  Treatment plan discussed with: patient        Subjective Evaluation    History of Present Illness  Mechanism of injury: Pt presents with right shoulder pain. Reports Ortho MD diagnosed her with right RTC tear. Unable to elevate RUE without assist from LUE.   Inability to elevate RUE for the past 6 months. Pain intermittent in shoulder but constant pain in upper arm between shoulder and elbow. Reports intermittent altered sensation right thumb/wrist.  Reports crepitus in shoulder. No MRI to date. Had one injection which did not help symptoms. Patient Goals  Patient goals for therapy: decreased pain, increased motion, increased strength and independence with ADLs/IADLs    Pain  Current pain ratin  At best pain ratin  At worst pain ratin  Location: Right shoulder/upper arm  Quality: sharp  Relieving factors: rest  Aggravating factors: lifting  Progression: improved    Hand dominance: right    Treatments  Current treatment: physical therapy        Objective     Postural Observations    Additional Postural Observation Details  Forward head  Forward rounded shoulders    Tenderness     Right Shoulder  No tenderness     Active Range of Motion     Right Shoulder   Flexion: 110 degrees   Abduction: 105 degrees   External rotation BTH: C3   Internal rotation BTB: L1     Passive Range of Motion     Right Shoulder   Flexion: WFL  Abduction: WFL  Internal rotation 90°: WFL    Strength/Myotome Testing     Left Shoulder     Isolated Muscles   Upper trapezius: 4     Right Shoulder     Planes of Motion   Flexion: 3+   Abduction: 3+   External rotation at 0°: 2   Internal rotation at 0°: 4-     Isolated Muscles   Biceps: 4   Triceps: 4-   Upper trapezius: 4     Additional Strength Details  MMT in available ROM    Tests     Right Shoulder   Positive empty can. Precautions:  Hx Breast Cancer.   NO E-STIM OR ULTRASOUND       Manuals 8/4/23 8-8-23 8-10-23 8/15/23 8-2-23   PROM - Mobs right shoulder 10'  10' 10' 10' 10'                           Neuro Re-Ed         LTP/MTP Green x30 ea grn x30 ea Green x30 grn x30 ea grn 3x10 ea   T-band ER/IR Green IR 30x  Green ER 30x AAROM   Green IR 30x  Green ER 30x AAROM Green IR 30x  Green ER 30x  AAROM Green IR 30x Green ER 30x AAROM  red IR x30  grn ER 30x AAROM   T-band Add Green x30 grn x30 Green 3x10 grn 3x10 grn 3x10   T-band punches    Green 20x    D2 extension                         Ther Ex        UBE for posture education 8' alt  8' alt  8'  alt 8' alt  8' alt    nustep L5 15' L5 15' L5  15' L5  15' L5 15'   pulleys 30x 30x 30x  5" 30x 5"  30x 5"    Bicep curl 4# 3x10 fadumo 4# 3x10 fadumo  4# 3x10 4# 3x10 4# 3x10   Bent over row 4# 3x10 fadumo 4# 3x10 fadumo  4#  30x 4# 30x 4#  30x   Finger ladder 15x fwd  x15 fwd  15x scaption 15x scaption 15x scap   Wall slide 10x  W/ LUE assist 15x w/ LUE assist  15x w/ LUE assist 15x  W/ LUE assist  15x  W/ LUE assist    Stand table push up 3x10  3x10 3x10 3x10  3x10                   HEP        Ther Activity                        Gait Training                        Modalities        CP  11' post session R shoulder  12' post tx R shld Declined          10' post session

## 2023-08-17 ENCOUNTER — OFFICE VISIT (OUTPATIENT)
Dept: PHYSICAL THERAPY | Facility: CLINIC | Age: 83
End: 2023-08-17
Payer: MEDICARE

## 2023-08-17 DIAGNOSIS — M25.511 CHRONIC RIGHT SHOULDER PAIN: Primary | ICD-10-CM

## 2023-08-17 DIAGNOSIS — G89.29 CHRONIC RIGHT SHOULDER PAIN: Primary | ICD-10-CM

## 2023-08-17 DIAGNOSIS — M75.121 COMPLETE TEAR OF RIGHT ROTATOR CUFF, UNSPECIFIED WHETHER TRAUMATIC: ICD-10-CM

## 2023-08-17 PROCEDURE — 97110 THERAPEUTIC EXERCISES: CPT

## 2023-08-17 PROCEDURE — 97112 NEUROMUSCULAR REEDUCATION: CPT

## 2023-08-17 PROCEDURE — 97140 MANUAL THERAPY 1/> REGIONS: CPT

## 2023-08-17 NOTE — PROGRESS NOTES
Daily Note     Today's date: 2023  Patient name: Adam Arango  : 1940  MRN: [id number]  Referring provider: Jay Campbell MD  Dx:   Encounter Diagnosis     ICD-10-CM    1. Chronic right shoulder pain  M25.511     G89.29       2. Complete tear of right rotator cuff, unspecified whether traumatic  M75.121                      Subjective: Sandra Wiley reports doing much more with R shoulder. Objective: See treatment diary below      Assessment: Improvement in R UE flexibility following manual stretching/PROM. Visual and VC to ensure correct exercise technique. Educated pt on purpose/benefits of fing ladder for ROM. Limited ROM with tband punches. Progress as able. Plan: Continue with current POC to address pt deficits. Precautions:  Hx Breast Cancer.   NO E-STIM OR ULTRASOUND       Manuals 8/4/23 8-8-23 8-10-23 8/15/23 8/17/23   PROM - Mobs right shoulder 10'  10' 10' 10' 10'                           Neuro Re-Ed         LTP/MTP Green x30 ea grn x30 ea Green x30 grn x30 ea grn x30 ea   T-band ER/IR Green IR 30x  Green ER 30x AAROM   Green IR 30x  Green ER 30x AAROM Green IR 30x  Green ER 30x  AAROM Green IR 30x Green ER 30x AAROM  grn IR 30x  grn ER 30x AAROM   T-band Add Green x30 grn x30 Green 3x10 grn 3x10 grn 3x10   T-band punches    Green 20x grn x20   D2 extension                         Ther Ex        UBE for posture education 8' alt  8' alt  8'  alt 8' alt  8' alt    nustep L5 15' L5 15' L5  15' L5  15' L5 15'   pulleys 30x 30x 30x  5" 30x 5"  30x 5"    Bicep curl 4# 3x10 fadumo 4# 3x10 fadumo  4# 3x10 4# 3x10 4# 3x10   Bent over row 4# 3x10 fadumo 4# 3x10 fadumo  4#  30x 4# 30x 4# 30x   Finger ladder 15x fwd  x15 fwd  15x scaption 15x scaption 15x scaption    Wall slide 10x  W/ LUE assist 15x w/ LUE assist  15x w/ LUE assist 15x  W/ LUE assist  15x w/LUE assist    Stand table push up 3x10  3x10 3x10 3x10  3x10                   HEP        Ther Activity                        Gait Training Modalities        CP  11' post session R shoulder  12' post tx R shld Declined  12' post tx R shoulder

## 2023-08-21 ENCOUNTER — OFFICE VISIT (OUTPATIENT)
Dept: PHYSICAL THERAPY | Facility: CLINIC | Age: 83
End: 2023-08-21
Payer: MEDICARE

## 2023-08-21 DIAGNOSIS — M75.121 COMPLETE TEAR OF RIGHT ROTATOR CUFF, UNSPECIFIED WHETHER TRAUMATIC: ICD-10-CM

## 2023-08-21 DIAGNOSIS — M25.511 CHRONIC RIGHT SHOULDER PAIN: Primary | ICD-10-CM

## 2023-08-21 DIAGNOSIS — G89.29 CHRONIC RIGHT SHOULDER PAIN: Primary | ICD-10-CM

## 2023-08-21 PROCEDURE — 97110 THERAPEUTIC EXERCISES: CPT

## 2023-08-21 PROCEDURE — 97140 MANUAL THERAPY 1/> REGIONS: CPT

## 2023-08-21 NOTE — PROGRESS NOTES
Daily Note     Today's date: 2023  Patient name: Earlene Jeffries  : 1940  MRN: [id number]  Referring provider: Napoleon Portillo MD  Dx:   Encounter Diagnosis     ICD-10-CM    1. Chronic right shoulder pain  M25.511     G89.29       2. Complete tear of right rotator cuff, unspecified whether traumatic  M75.121                      Subjective: Pt reports she went for a bike ride the other day and took her right hand off of the handle to reach for something and tweaked her R shoulder. She reports since then it has been sore on the outside and down to the elbow. Objective: See treatment diary below      Assessment: Tolerated treatment well. Pt reported discomfort in shoulder with webslide punches. Difficulty remains with ER of R shoulder. Pt reported that PROM and rhythmic oscillations improve her shoulder pain. Patient would benefit from continued PT      Plan: Progress treatment as tolerated. Precautions:  Hx Breast Cancer.   NO E-STIM OR ULTRASOUND       Manuals 8-21-23 8-8-23 8-10-23 8/15/23 8/17/23   PROM - Mobs right shoulder 10'  10' 10' 10' 10'                           Neuro Re-Ed         LTP/MTP Green x30 ea grn x30 ea Green x30 grn x30 ea grn x30 ea   T-band ER/IR Green IR 30x  Green ER 30x AAROM   Green IR 30x  Green ER 30x AAROM Green IR 30x  Green ER 30x  AAROM Green IR 30x Green ER 30x AAROM  grn IR 30x  grn ER 30x AAROM   T-band Add Green x30 grn x30 Green 3x10 grn 3x10 grn 3x10   T-band punches Green x15   Green 20x grn x20   D2 extension                         Ther Ex        UBE for posture education 8' alt  8' alt  8'  alt 8' alt  8' alt    nustep Pt deferred; biking later L5 15' L5  15' L5  15' L5 15'   pulleys 30x 30x 30x  5" 30x 5"  30x 5"    Bicep curl 4# 3x10 fadumo 4# 3x10 fadumo  4# 3x10 4# 3x10 4# 3x10   Bent over row 4# 3x10 fadumo 4# 3x10 fadumo  4#  30x 4# 30x 4# 30x   Finger ladder 15x fwd  x15 fwd  15x scaption 15x scaption 15x scaption    Wall slide 10x  W/ LUE assist 15x w/ LUE assist  15x w/ LUE assist 15x  W/ LUE assist  15x w/LUE assist    Stand table push up 3x10  3x10 3x10 3x10  3x10                   HEP        Ther Activity                        Gait Training                        Modalities        CP 10' post session R shoulder  11' post session R shoulder  12' post tx R shld Declined  12' post tx R shoulder

## 2023-08-23 ENCOUNTER — OFFICE VISIT (OUTPATIENT)
Dept: PHYSICAL THERAPY | Facility: CLINIC | Age: 83
End: 2023-08-23
Payer: MEDICARE

## 2023-08-23 DIAGNOSIS — M75.121 COMPLETE TEAR OF RIGHT ROTATOR CUFF, UNSPECIFIED WHETHER TRAUMATIC: ICD-10-CM

## 2023-08-23 DIAGNOSIS — M25.511 CHRONIC RIGHT SHOULDER PAIN: Primary | ICD-10-CM

## 2023-08-23 DIAGNOSIS — G89.29 CHRONIC RIGHT SHOULDER PAIN: Primary | ICD-10-CM

## 2023-08-23 PROCEDURE — 97140 MANUAL THERAPY 1/> REGIONS: CPT

## 2023-08-23 PROCEDURE — 97112 NEUROMUSCULAR REEDUCATION: CPT

## 2023-08-23 PROCEDURE — 97110 THERAPEUTIC EXERCISES: CPT

## 2023-08-23 NOTE — PROGRESS NOTES
Daily Note     Today's date: 2023  Patient name: Yimi Lovelace  : 1940  MRN: [id number]  Referring provider: Fei Awad MD  Dx:   Encounter Diagnosis     ICD-10-CM    1. Chronic right shoulder pain  M25.511     G89.29       2. Complete tear of right rotator cuff, unspecified whether traumatic  M75.121                      Subjective :Toribio Valencia reports overall doing better in reference to R shoulder pain and functional capacity since starting physical therapy. Objective: See treatment diary below      Assessment: Visual and VC to ensure correct exercise technique. Increased ROM performing tband punches this visit w/o increased pain. Pain on eccentric portion of wall slides; educated patient to only go to tolerance to avoid sharp pain with good follow through. Progress as able. Plan: Continue with current POC to address pt deficits. Precautions:  Hx Breast Cancer.   NO E-STIM OR ULTRASOUND       Manuals 8-21-23 8/23/23 8-10-23 8/15/23 8/17/23   PROM - Mobs right shoulder 10'  10' 10' 10' 10'                           Neuro Re-Ed         LTP/MTP Green x30 ea grn x30 ea Green x30 grn x30 ea grn x30 ea   T-band ER/IR Green IR 30x  Green ER 30x AAROM   grn IR 30x  grn ER 30x AAROM Green IR 30x  Green ER 30x  AAROM Green IR 30x Green ER 30x AAROM  grn IR 30x  grn ER 30x AAROM   T-band Add Green x30 grn x30 Green 3x10 grn 3x10 grn 3x10   T-band punches Green x15 grn x15  Green 20x grn x20   D2 extension                         Ther Ex        UBE for posture education 8' alt  8' alt  8'  alt 8' alt  8' alt    nustep Pt deferred; biking later L5 15' L5  15' L5  15' L5 15'   pulleys 30x 30x 30x  5" 30x 5"  30x 5"    Bicep curl 4# 3x10 fadumo 4# 3x10 fadumo  4# 3x10 4# 3x10 4# 3x10   Bent over row 4# 3x10 fadumo 4# 3x10 fadumo  4#  30x 4# 30x 4# 30x   Finger ladder 15x fwd  15x fwd  15x scaption 15x scaption 15x scaption    Wall slide 10x  W/ LUE assist 10x w/LUE assist  15x w/ LUE assist 15x  W/ LUE assist  15x w/LUE assist    Stand table push up 3x10  3x10 3x10 3x10  3x10                   HEP        Ther Activity                        Gait Training                        Modalities        CP 10' post session R shoulder  10' post session R shoulder  12' post tx R shld Declined  12' post tx R shoulder

## 2023-08-28 ENCOUNTER — OFFICE VISIT (OUTPATIENT)
Dept: PHYSICAL THERAPY | Facility: CLINIC | Age: 83
End: 2023-08-28
Payer: MEDICARE

## 2023-08-28 DIAGNOSIS — M75.121 COMPLETE TEAR OF RIGHT ROTATOR CUFF, UNSPECIFIED WHETHER TRAUMATIC: ICD-10-CM

## 2023-08-28 DIAGNOSIS — G89.29 CHRONIC RIGHT SHOULDER PAIN: Primary | ICD-10-CM

## 2023-08-28 DIAGNOSIS — M25.511 CHRONIC RIGHT SHOULDER PAIN: Primary | ICD-10-CM

## 2023-08-28 PROCEDURE — 97112 NEUROMUSCULAR REEDUCATION: CPT

## 2023-08-28 PROCEDURE — 97110 THERAPEUTIC EXERCISES: CPT

## 2023-08-28 PROCEDURE — 97140 MANUAL THERAPY 1/> REGIONS: CPT

## 2023-08-28 NOTE — PROGRESS NOTES
Daily Note     Today's date: 2023  Patient name: Edie Whittington  : 1940  MRN: [id number]  Referring provider: Tami Melgoza MD  Dx:   Encounter Diagnosis     ICD-10-CM    1. Chronic right shoulder pain  M25.511     G89.29       2. Complete tear of right rotator cuff, unspecified whether traumatic  M75.121                      Subjective: The shoulder is a little sore today. I was carrying a backpack this weekend and I think that did it      Objective: See treatment diary below      Assessment: Tolerated treatment well. Tightness noted with ER with manual therapy. Reports general shoulder soreness from us of backpack while away. Patient would benefit from continued PT      Plan: Continue per plan of care. Precautions:  Hx Breast Cancer.   NO E-STIM OR ULTRASOUND       Manuals 8-21-23 8/23/23 8-28-23 8/15/23 8/17/23   PROM - Mobs right shoulder 10'  10' 10' 10' 10'                           Neuro Re-Ed         LTP/MTP Green x30 ea grn x30 ea Green x30 grn x30 ea grn x30 ea   T-band ER/IR Green IR 30x  Green ER 30x AAROM   grn IR 30x  grn ER 30x AAROM Green IR 30x  Green ER 30x  AAROM Green IR 30x Green ER 30x AAROM  grn IR 30x  grn ER 30x AAROM   T-band Add Green x30 grn x30 Green 3x10 grn 3x10 grn 3x10   T-band punches Green x15 grn x15 Green 15x Green 20x grn x20   D2 extension                         Ther Ex        UBE for posture education 8' alt  8' alt  8'  alt 8' alt  8' alt    nustep Pt deferred; biking later L5 15' L5  15' L5  15' L5 15'   pulleys 30x 30x 30x  5" 30x 5"  30x 5"    Bicep curl 4# 3x10 fadumo 4# 3x10 fadumo  4# 3x10 4# 3x10 4# 3x10   Bent over row 4# 3x10 fadumo 4# 3x10 fadumo  4#  30x 4# 30x 4# 30x   Finger ladder 15x fwd  15x fwd  15x scaption 15x scaption 15x scaption    Wall slide 10x  W/ LUE assist 10x w/LUE assist  15x w/ LUE assist 15x  W/ LUE assist  15x w/LUE assist    Stand table push up 3x10  3x10 3x10 3x10  3x10                   HEP        Ther Activity Gait Training                        Modalities        CP 10' post session R shoulder  10' post session R shoulder  Declined  Declined  12' post tx R shoulder

## 2023-08-29 ENCOUNTER — OFFICE VISIT (OUTPATIENT)
Dept: CARDIOLOGY CLINIC | Facility: CLINIC | Age: 83
End: 2023-08-29
Payer: MEDICARE

## 2023-08-29 VITALS
HEIGHT: 64 IN | BODY MASS INDEX: 28.85 KG/M2 | DIASTOLIC BLOOD PRESSURE: 100 MMHG | HEART RATE: 53 BPM | WEIGHT: 169 LBS | SYSTOLIC BLOOD PRESSURE: 170 MMHG

## 2023-08-29 DIAGNOSIS — I10 PRIMARY HYPERTENSION: ICD-10-CM

## 2023-08-29 DIAGNOSIS — I10 ESSENTIAL HYPERTENSION: ICD-10-CM

## 2023-08-29 DIAGNOSIS — R00.1 SINUS BRADYCARDIA: Primary | ICD-10-CM

## 2023-08-29 DIAGNOSIS — I44.0 1ST DEGREE AV BLOCK: ICD-10-CM

## 2023-08-29 DIAGNOSIS — R01.1 CARDIAC MURMUR: ICD-10-CM

## 2023-08-29 PROCEDURE — 93000 ELECTROCARDIOGRAM COMPLETE: CPT | Performed by: INTERNAL MEDICINE

## 2023-08-29 PROCEDURE — 99214 OFFICE O/P EST MOD 30 MIN: CPT | Performed by: INTERNAL MEDICINE

## 2023-08-29 RX ORDER — RAMIPRIL 10 MG/1
10 CAPSULE ORAL EVERY 12 HOURS
Qty: 90 CAPSULE | Refills: 3
Start: 2023-08-29

## 2023-08-29 NOTE — PROGRESS NOTES
[facility] Ricardo Pearson  [facility] [facility] [address]  Phone#  [phone]  Fax#  [phone]                                               Cardiology Office Follow up  Yadi Diggs, 81 y.o. female  YOB: 1940  MRN: [id number] Encounter: [id number]      PCP - Benji Myers MD  Referring Provider - No ref. provider found    Chief Complaint   Patient presents with   • routine follow up       Assessment  Sinus bradycardia  TTE - 8/2022: LVEF 60%, aortic sclerosis, mild AI/MR/TR  1st degree AV block  Hypertension  Dyslipidemia  Overweight, Body mass index is 29.01 kg/m². Plan  Sinus bradycardia  Heart rate is a little bit improved since discontinuation of metoprolol and fatigue is improved as well  Heart rate remains in the 50s, but she is now asymptomatic  Monitor clinically     Hypertension  BP elevated today - 170/100 on personal check today  She did have another elevated BP reading a couple of months ago as well  Currently on HCTZ 12.5 mg daily, ramipril 10 mg daily  Increase ramipiril to 10 mg bid  Monitor BP daily, and call me if BP persistently > 140/90  Check follow up BMP  Close follow up with PCP regarding BP    Cardiac murmur / Aortic scloeris  TTE: 8/2022 showed LVEF 60%, aortic sclerosis, mild AI/MR/TR  Monitor clinically    Dyslipidemia    All available lipids, dating back to 2014 have been well controlled  Well-controlled lipids and tolerating atorvastatin  Continue atorvastatin 20 mg daily     Results for orders placed or performed in visit on 08/29/23   POCT ECG    Impression    Sinus bradycardia with first-degree AV block       Orders Placed This Encounter   Procedures   • Basic metabolic panel   • POCT ECG     Return in about 4 months (around 12/29/2023), or if symptoms worsen or fail to improve. History of Present Illness   81 y.o. female comes in as a new patient for consultation regarding bradycardia.     She reports no active complains of chest pain, shortness of breath, palpitations or dizziness. No recent or prior history of near-syncope or syncope. She does report that she had increased fatigue recently and as a result her metoprolol dosing was decreased. But with this change, her fatigue has improved significantly and she feels much better. Due to her own concerns about slow heart rate, she was referred to see Cardiology. She reports to me that she has been on multiple medications for several years, but states that these were mainly started due to her bringing up a [place] times article with her physician at that time, apparently indicating that "everybody would benefit from statin and HCTZ". And she has since remained on these. No prior known history of coronary artery disease or heart failure. No prior major cardiac testing. Interval history - 8/29/2023  She comes back for follow-up after 1 year. In the interim, she has been doing well and has not had any issues with chest pain, shortness of breath or palpitations. She reports feeling better after discontinuation of metoprolol and has noted any issues with palpitations thereafter. Her blood pressure is incidentally noted to be severely elevated today.         Historical Information   Past Medical History:   Diagnosis Date   • Breast cancer ([address])    • Hyperlipidemia, unspecified    • Hypertension    • Rotator cuff injury     Right     Past Surgical History:   Procedure Laterality Date   • BREAST LUMPECTOMY     • BREAST SURGERY     • CATARACT EXTRACTION     • EYE SURGERY     • HYSTERECTOMY       Family History   Problem Relation Age of Onset   • Heart disease Mother    • Heart disease Father    • Heart disease Sister      Current Outpatient Medications on File Prior to Visit   Medication Sig Dispense Refill   • anastrozole (ARIMIDEX) 1 mg tablet Take 1 mg by mouth daily     • aspirin (ECOTRIN LOW STRENGTH) 81 mg EC tablet Take 81 mg by mouth     • atorvastatin (LIPITOR) 20 mg tablet take 1 tablet by mouth every morning 90 tablet 3   • Calcium-Phosphorus-Vitamin D (Citracal +D3) 250-107-500 MG-MG-UNIT CHEW Chew     • dorzolamide-timolol (COSOPT) 22.3-6.8 MG/ML ophthalmic solution 1 drop 8 am and 8 pm both eyes     • hydrochlorothiazide (HYDRODIURIL) 12.5 mg tablet take 1 tablet by mouth every morning 90 tablet 3   • Multiple Vitamin (multivitamin) capsule Take 1 capsule by mouth daily     • travoprost (TRAVATAN-Z) 0.004 % ophthalmic solution instill 1 drop into both eyes BEFORE BEDTIME     • [DISCONTINUED] ramipril (ALTACE) 10 MG capsule take 1 capsule by mouth every morning 90 capsule 3     No current facility-administered medications on file prior to visit. Allergies   Allergen Reactions   • Brimonidine Other (See Comments)     Eye lashes fell off     Social History     Socioeconomic History   • Marital status: /Civil Union     Spouse name: None   • Number of children: None   • Years of education: None   • Highest education level: None   Occupational History   • None   Tobacco Use   • Smoking status: Never   • Smokeless tobacco: Never   Vaping Use   • Vaping Use: Never used   Substance and Sexual Activity   • Alcohol use: Yes     Comment: occasionally   • Drug use: Not Currently   • Sexual activity: None   Other Topics Concern   • None   Social History Narrative   • None     Social Determinants of Health     Financial Resource Strain: Low Risk  (11/15/2022)    Overall Financial Resource Strain (CARDIA)    • Difficulty of Paying Living Expenses: Not hard at all   Food Insecurity: Not on file   Transportation Needs: No Transportation Needs (11/15/2022)    PRAPARE - Transportation    • Lack of Transportation (Medical): No    • Lack of Transportation (Non-Medical):  No   Physical Activity: Not on file   Stress: Not on file   Social Connections: Not on file   Intimate Partner Violence: Not on file   Housing Stability: Not on file        Review of Systems   All other systems reviewed and are negative. Vitals:  Vitals:    08/29/23 0942 08/29/23 1059   BP:  170/100   Pulse: (!) 53    Weight: 76.7 kg (169 lb)    Height: 5' 4" (1.626 m)      BMI - Body mass index is 29.01 kg/m². Wt Readings from Last 7 Encounters:   08/29/23 76.7 kg (169 lb)   04/05/23 76.2 kg (168 lb)   11/15/22 74.4 kg (164 lb)   08/30/22 73.9 kg (163 lb)   08/03/22 73.9 kg (163 lb)   05/11/22 75.4 kg (166 lb 3.2 oz)   11/09/21 74.7 kg (164 lb 9.6 oz)       Physical Exam  Vitals and nursing note reviewed. Constitutional:       General: She is not in acute distress. Appearance: Normal appearance. She is well-developed. She is not ill-appearing. HENT:      Head: Normocephalic and atraumatic. Nose: No congestion. Eyes:      General: No scleral icterus. Conjunctiva/sclera: Conjunctivae normal.   Neck:      Vascular: No carotid bruit or JVD. Cardiovascular:      Rate and Rhythm: Normal rate and regular rhythm. Pulses: Normal pulses. Heart sounds: Murmur heard. Crescendo decrescendo systolic murmur is present with a grade of 3/6. No friction rub. No gallop. Pulmonary:      Effort: Pulmonary effort is normal. No respiratory distress. Breath sounds: Normal breath sounds. No rales. Abdominal:      General: There is no distension. Palpations: Abdomen is soft. Tenderness: There is no abdominal tenderness. Musculoskeletal:         General: No swelling or tenderness. Cervical back: Neck supple. Right lower leg: No edema. Left lower leg: No edema. Skin:     General: Skin is warm. Neurological:      General: No focal deficit present. Mental Status: She is alert and oriented to person, place, and time. Mental status is at baseline. Psychiatric:         Mood and Affect: Mood normal.         Behavior: Behavior normal.         Thought Content:  Thought content normal.         Labs:  CBC:   Lab Results   Component Value Date    WBC 5.82 11/15/2022    RBC 4.15 11/15/2022    HGB 12.0 11/15/2022    HCT 38.1 11/15/2022    MCV 92 11/15/2022    PLT 97 (L) 11/15/2022    RDW 13.3 11/15/2022       CMP:   Lab Results   Component Value Date    K 4.4 11/15/2022     11/15/2022    CO2 29 11/15/2022    BUN 19 11/15/2022    CREATININE 0.78 11/15/2022    EGFR 71 11/15/2022    CALCIUM 9.6 11/15/2022    AST 17 11/15/2022    ALT 24 11/15/2022    ALKPHOS 68 11/15/2022       Magnesium:  No results found for: "MG"    Lipid Profile:   Lab Results   Component Value Date    HDL 66 11/15/2022    TRIG 71 11/15/2022    LDLCALC 66 11/15/2022       Thyroid Studies: No results found for: "AQH9PCTSVKNT", "T3FREE", "[place]", "R9ALUMG", "V7SYSVV"    A1c:  No components found for: "HGA1C"    INR:  No results found for: "INR"5    Imaging: No results found. Cardiac testing:   No results found for this or any previous visit. No results found for this or any previous visit. No results found for this or any previous visit. No results found for this or any previous visit. XR foot 3+ vw left  Narrative: LEFT FOOT    INDICATION:   M79.672: Pain in left foot. COMPARISON:  3/28/2023    VIEWS:  XR FOOT 3+ VW LEFT     FINDINGS:    There is no acute fracture or dislocation. Previously question cuboid fracture not appreciated on this exam.    Diffuse mild osteopenia is present. Mild hallux valgus deformity is noted. 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 Mild degenerative change in the midfoot noted. Mild pes planus present. No lytic or blastic osseous lesion. Soft tissues are unremarkable. Impression: No acute osseous abnormality. Degenerative changes as described.     Workstation performed: [id number]

## 2023-08-30 ENCOUNTER — OFFICE VISIT (OUTPATIENT)
Dept: PHYSICAL THERAPY | Facility: CLINIC | Age: 83
End: 2023-08-30
Payer: MEDICARE

## 2023-08-30 DIAGNOSIS — M75.121 COMPLETE TEAR OF RIGHT ROTATOR CUFF, UNSPECIFIED WHETHER TRAUMATIC: ICD-10-CM

## 2023-08-30 DIAGNOSIS — M25.511 CHRONIC RIGHT SHOULDER PAIN: Primary | ICD-10-CM

## 2023-08-30 DIAGNOSIS — G89.29 CHRONIC RIGHT SHOULDER PAIN: Primary | ICD-10-CM

## 2023-08-30 PROCEDURE — 97110 THERAPEUTIC EXERCISES: CPT

## 2023-08-30 PROCEDURE — 97112 NEUROMUSCULAR REEDUCATION: CPT

## 2023-08-30 PROCEDURE — 97140 MANUAL THERAPY 1/> REGIONS: CPT

## 2023-08-30 NOTE — PROGRESS NOTES
Daily Note     Today's date: 2023  Patient name: Martina Coffey  : 1940  MRN: [id number]  Referring provider: Jamie Brittle, MD  Dx:   Encounter Diagnosis     ICD-10-CM    1. Chronic right shoulder pain  M25.511     G89.29       2. Complete tear of right rotator cuff, unspecified whether traumatic  M75.121                      Subjective: King Collins reports that she always feels better with R shoulder following therapy session. Objective: See treatment diary below      Assessment: Visual and VC to ensure correct exercise technique. Decreased shoulder flex/abd during PROM due to pain in lateral deltoid. Decreased ROM during tband chest press; denied pain. Unable to complete full program as pt had another appt. Progress as able. Plan: Continue with current POC to address pt deficits. Precautions:  Hx Breast Cancer. NO E-STIM OR ULTRASOUND       Manuals 23   PROM - Mobs right shoulder 10'  10' 10' 10' 10'                           Neuro Re-Ed         LTP/MTP Green x30 ea grn x30 ea Green x30 grn x30 ea grn x30 ea   T-band ER/IR Green IR 30x  Green ER 30x AAROM   grn IR 30x  grn ER 30x AAROM Green IR 30x  Green ER 30x  AAROM grn IR x30  grn ER AAROM grn IR 30x  grn ER 30x AAROM   T-band Add Green x30 grn x30 Green 3x10 grn 3x10 grn 3x10   T-band punches Green x15 grn x15 Green 15x grn x15 grn x20   D2 extension                         Ther Ex        UBE for posture education 8' alt  8' alt  8'  alt 8' alt  8' alt    nustep Pt deferred; biking later L5 15' L5  15' Not today-time constraints.   L5 15'   pulleys 30x 30x 30x  5" 30x  5"  30x 5"    Bicep curl 4# 3x10 fadumo 4# 3x10 fadumo  4# 3x10 4# 3x10 4# 3x10   Bent over row 4# 3x10 fadumo 4# 3x10 fadumo  4#  30x 4# 3x10 4# 30x   Finger ladder 15x fwd  15x fwd  15x scaption x15 scaption  15x scaption    Wall slide 10x  W/ LUE assist 10x w/LUE assist  15x w/ LUE assist 15x w/LUE assist  15x w/LUE assist    Stand table push up 3x10  3x10 3x10 3x10 3x10                   HEP        Ther Activity                        Gait Training                        Modalities        CP 10' post session R shoulder  10' post session R shoulder  Declined  Declined  12' post tx R shoulder

## 2023-09-05 ENCOUNTER — OFFICE VISIT (OUTPATIENT)
Dept: PHYSICAL THERAPY | Facility: CLINIC | Age: 83
End: 2023-09-05
Payer: MEDICARE

## 2023-09-05 DIAGNOSIS — M75.121 COMPLETE TEAR OF RIGHT ROTATOR CUFF, UNSPECIFIED WHETHER TRAUMATIC: ICD-10-CM

## 2023-09-05 DIAGNOSIS — M25.511 CHRONIC RIGHT SHOULDER PAIN: Primary | ICD-10-CM

## 2023-09-05 DIAGNOSIS — G89.29 CHRONIC RIGHT SHOULDER PAIN: Primary | ICD-10-CM

## 2023-09-05 PROCEDURE — 97110 THERAPEUTIC EXERCISES: CPT

## 2023-09-05 PROCEDURE — 97140 MANUAL THERAPY 1/> REGIONS: CPT

## 2023-09-05 NOTE — PROGRESS NOTES
Daily Note     Today's date: 2023  Patient name: Augusto Jarvis  : 1940  MRN: [id number]  Referring provider: Rafael Abdul MD  Dx:   Encounter Diagnosis     ICD-10-CM    1. Chronic right shoulder pain  M25.511     G89.29       2. Complete tear of right rotator cuff, unspecified whether traumatic  M75.121                      Subjective: Pt reports she is doing well today. Objective: See treatment diary below      Assessment: Tolerated treatment well. Pt tolerated session well. Increased difficulty with webslide punches; decreased to red. No reports of pain in shoulder. Improvements in PROM this session. Patient would benefit from continued PT      Plan: Progress treatment as tolerated. Precautions:  Hx Breast Cancer. NO E-STIM OR ULTRASOUND       Manuals 23   PROM - Mobs right shoulder 10'  10' 10' 10' 10'                           Neuro Re-Ed         LTP/MTP Green x30 ea grn x30 ea Green x30 grn x30 ea grn x30 ea   T-band ER/IR Green IR 30x  Green ER 30x AAROM   grn IR 30x  grn ER 30x AAROM Green IR 30x  Green ER 30x  AAROM grn IR x30  grn ER AAROM grn IR 30x  grn ER 30x AAROM   T-band Add Green x30 grn x30 Green 3x10 grn 3x10 grn 3x10   T-band punches Green x15 grn x15 Green 15x grn x15 red x20   D2 extension                         Ther Ex        UBE for posture education 8' alt  8' alt  8'  alt 8' alt  8' alt    nustep Pt deferred; biking later L5 15' L5  15' Not today-time constraints.   L5 15'   pulleys 30x 30x 30x  5" 30x  5"  30x 5"    Bicep curl 4# 3x10 fadumo 4# 3x10 fadumo  4# 3x10 4# 3x10 4# 3x10   Bent over row 4# 3x10 fadumo 4# 3x10 fadumo  4#  30x 4# 3x10 4# 30x   Finger ladder 15x fwd  15x fwd  15x scaption x15 scaption  15x scaption    Wall slide 10x  W/ LUE assist 10x w/LUE assist  15x w/ LUE assist 15x w/LUE assist  15x w/LUE assist    Stand table push up 3x10  3x10 3x10 3x10 3x10                   HEP        Ther Activity Gait Training                        Modalities        CP 10' post session R shoulder  10' post session R shoulder  Declined  Declined  10' post tx R shoulder rolling walker

## 2023-09-08 ENCOUNTER — EVALUATION (OUTPATIENT)
Dept: PHYSICAL THERAPY | Facility: CLINIC | Age: 83
End: 2023-09-08
Payer: MEDICARE

## 2023-09-08 DIAGNOSIS — G89.29 CHRONIC RIGHT SHOULDER PAIN: Primary | ICD-10-CM

## 2023-09-08 DIAGNOSIS — M75.121 COMPLETE TEAR OF RIGHT ROTATOR CUFF, UNSPECIFIED WHETHER TRAUMATIC: ICD-10-CM

## 2023-09-08 DIAGNOSIS — M25.511 CHRONIC RIGHT SHOULDER PAIN: Primary | ICD-10-CM

## 2023-09-08 PROCEDURE — 97110 THERAPEUTIC EXERCISES: CPT

## 2023-09-08 NOTE — PROGRESS NOTES
PT RE-EVALUATION     Today's date: 2023  Patient name: zEra Newman  : 1940  MRN: [id number]  Referring provider: Liborio Maldonado MD  Dx:   Encounter Diagnosis     ICD-10-CM    1. Chronic right shoulder pain  M25.511     G89.29       2. Complete tear of right rotator cuff, unspecified whether traumatic  M75.121                      Assessment  Assessment details: Pt presented with chronic right shoulder pain and progressive loss of function. Presently reports she is unable to elevate UE without assist from LUE. Reports constant pain. Right hand dominant. Reports all ADL's limited. PT notes poor strength and significant limitations in ROM. Recent Ortho MD consult and reports MD feels she has RTC tear. No MRI performed. 8-15-23:  Pt reports she continues to feel improvement in functional levels with use of right shoulder. Reports continued varied pain levels primarily in right upper arm. Reports sharp pain that resolves quickly with positional changes. Pain primarily with reaching/lfiting. Does continue to report weakness and can only lift light objects with RUE. Does report requiring rest periods with activity due to soreness shoulder/upper arm. Rest does help resolve symptoms. No significant changes noted in AROM. Feels continued improvement with PT tx. Will benefit from continued PT tx to decrease symptoms and improve functional level. 23:  Pt continues to demonstrate improvement in AROM and strength right shoulder. Generally no or low level pain in shoulder. Intermittent sharp pain that is positional only. Will disrupt sleep if laying on right side. Reports continued improvement lifting light objects but unable to lift/carry heavier objects. Continued weakness noted with ER. Will benefit from continued PT to maximize strength, motion, and functional levels.     Impairments: abnormal or restricted ROM, activity intolerance, impaired physical strength, lacks appropriate home exercise program and pain with function     Prognosis: fair    Goals  ST. Decrease pain 25% 6 wk  2. Increase shoulder AROM by 30-40 degrees 6 wk  3. Increase shoulder strength to 3+/5 6 wk  4. Pt will report no difficulty with activity below shoulder level 6 wk   (met/partially met)  LT. Pt will report 50% decrease in pain 12 wk  2. Increase shoulder AROM to [facility] 12 wk  3. Increase shoulder strength to [facility] 12 wk  4. Pt will report no limitations with ADL's 12 wk (partially met)    Plan  Patient would benefit from: PT eval and skilled physical therapy  Planned modality interventions: cryotherapy and thermotherapy: hydrocollator packs  Planned therapy interventions: joint mobilization, manual therapy, neuromuscular re-education, strengthening, stretching, therapeutic activities, therapeutic exercise, flexibility, functional ROM exercises, home exercise program and postural training  Frequency: 3x week  Duration in weeks: 12  Treatment plan discussed with: patient        Subjective Evaluation    History of Present Illness  Mechanism of injury: Pt presents with right shoulder pain. Reports Ortho MD diagnosed her with right RTC tear. Unable to elevate RUE without assist from LUE. Inability to elevate RUE for the past 6 months. Pain intermittent in shoulder but constant pain in upper arm between shoulder and elbow. Reports intermittent altered sensation right thumb/wrist.  Reports crepitus in shoulder. No MRI to date. Had one injection which did not help symptoms.     Patient Goals  Patient goals for therapy: decreased pain, increased motion, increased strength and independence with ADLs/IADLs    Pain  Current pain ratin  At best pain ratin  At worst pain ratin  Location: Right shoulder/upper arm  Quality: sharp  Relieving factors: rest  Aggravating factors: lifting  Progression: improved    Hand dominance: right    Treatments  Current treatment: physical therapy        Objective     Postural Observations    Additional Postural Observation Details  Forward head  Forward rounded shoulders    Tenderness     Right Shoulder  No tenderness     Active Range of Motion     Right Shoulder   Flexion: 130 degrees   Abduction: 130 degrees   External rotation BTH: C3   Internal rotation BTB: L1     Passive Range of Motion     Right Shoulder   Flexion: WFL  Abduction: WFL  Internal rotation 90°: WFL    Strength/Myotome Testing     Right Shoulder     Planes of Motion   Flexion: 4-   Abduction: 4-   External rotation at 0°: 2   Internal rotation at 0°: 4     Isolated Muscles   Biceps: 4+   Triceps: 4     Additional Strength Details  MMT in available ROM    Tests     Right Shoulder   Positive empty can. Precautions:  Hx Breast Cancer. NO E-STIM OR ULTRASOUND       Manuals 9-8-23 8/23/23 8-28-23 8/30/23 9-5-23   PROM - Mobs right shoulder 10'  10' 10' 10' 10'                           Neuro Re-Ed         LTP/MTP Green x30 ea grn x30 ea Green x30 grn x30 ea grn x30 ea   T-band ER/IR Green IR 30x  Green ER 30x AAROM   grn IR 30x  grn ER 30x AAROM Green IR 30x  Green ER 30x  AAROM grn IR x30  grn ER AAROM grn IR 30x  grn ER 30x AAROM   T-band Add Green x30 grn x30 Green 3x10 grn 3x10 grn 3x10   T-band punches Red x30 grn x15 Green 15x grn x15 red x20   D2 extension                         Ther Ex        UBE for posture education 8' alt  8' alt  8'  alt 8' alt  8' alt    nustep L5  15' L5 15' L5  15' Not today-time constraints.   L5 15'   pulleys 30x 30x 30x  5" 30x  5"  30x 5"    Bicep curl 4# 3x10 fadumo 4# 3x10 fadumo  4# 3x10 4# 3x10 4# 3x10   Bent over row 4# 3x10 fadumo 4# 3x10 fadumo  4#  30x 4# 3x10 4# 30x   Finger ladder 15x fwd  15x fwd  15x scaption x15 scaption  15x scaption    Wall slide 10x  W/ LUE assist 10x w/LUE assist  15x w/ LUE assist 15x w/LUE assist  15x w/LUE assist    Stand table push up 3x10  3x10 3x10 3x10 3x10                   HEP        Ther Activity Gait Training                        Modalities        CP  10' post session R shoulder  Declined  Declined  10' post tx R shoulder

## 2023-09-08 NOTE — LETTER
2023    Kunal Nelson MD  [address] [address]    Patient: Merna Camarillo   YOB: 1940   Date of Visit: 2023     Encounter Diagnosis     ICD-10-CM    1. Chronic right shoulder pain  M25.511     G89.29       2. Complete tear of right rotator cuff, unspecified whether traumatic  M75.121           Dear Dr. Jayden Quan:    Thank you for your recent referral of Merna Camarillo. Please review the attached evaluation summary from Lori's recent visit. Please verify that you agree with the plan of care by signing the attached order. If you have any questions or concerns, please do not hesitate to call. I sincerely appreciate the opportunity to share in the care of one of your patients and hope to have another opportunity to work with you in the near future. Sincerely,    Anila Tristan, PT      Referring Provider:      I certify that I have read the below Plan of Care and certify the need for these services furnished under this plan of treatment while under my care. Kunal Nelson MD  [address] [address]  Via Fax: [phone]          PT RE-EVALUATION     Today's date: 2023  Patient name: Merna Camarillo  : 1940  MRN: [id number]  Referring provider: Kunal Nelson MD  Dx:   Encounter Diagnosis     ICD-10-CM    1. Chronic right shoulder pain  M25.511     G89.29       2. Complete tear of right rotator cuff, unspecified whether traumatic  M75.121                      Assessment  Assessment details: Pt presented with chronic right shoulder pain and progressive loss of function. Presently reports she is unable to elevate UE without assist from LUE. Reports constant pain. Right hand dominant. Reports all ADL's limited. PT notes poor strength and significant limitations in ROM. Recent Ortho MD consult and reports MD feels she has RTC tear. No MRI performed.       8-15-23:  Pt reports she continues to feel improvement in functional levels with use of right shoulder. Reports continued varied pain levels primarily in right upper arm. Reports sharp pain that resolves quickly with positional changes. Pain primarily with reaching/lfiting. Does continue to report weakness and can only lift light objects with RUE. Does report requiring rest periods with activity due to soreness shoulder/upper arm. Rest does help resolve symptoms. No significant changes noted in AROM. Feels continued improvement with PT tx. Will benefit from continued PT tx to decrease symptoms and improve functional level. 23:  Pt continues to demonstrate improvement in AROM and strength right shoulder. Generally no or low level pain in shoulder. Intermittent sharp pain that is positional only. Will disrupt sleep if laying on right side. Reports continued improvement lifting light objects but unable to lift/carry heavier objects. Continued weakness noted with ER. Will benefit from continued PT to maximize strength, motion, and functional levels. Impairments: abnormal or restricted ROM, activity intolerance, impaired physical strength, lacks appropriate home exercise program and pain with function     Prognosis: fair    Goals  ST. Decrease pain 25% 6 wk  2. Increase shoulder AROM by 30-40 degrees 6 wk  3. Increase shoulder strength to 3+/5 6 wk  4. Pt will report no difficulty with activity below shoulder level 6 wk   (met/partially met)  LT. Pt will report 50% decrease in pain 12 wk  2. Increase shoulder AROM to [facility] 12 wk  3. Increase shoulder strength to [facility] 12 wk  4.   Pt will report no limitations with ADL's 12 wk (partially met)    Plan  Patient would benefit from: PT eval and skilled physical therapy  Planned modality interventions: cryotherapy and thermotherapy: hydrocollator packs  Planned therapy interventions: joint mobilization, manual therapy, neuromuscular re-education, strengthening, stretching, therapeutic activities, therapeutic exercise, flexibility, functional ROM exercises, home exercise program and postural training  Frequency: 3x week  Duration in weeks: 12  Treatment plan discussed with: patient        Subjective Evaluation    History of Present Illness  Mechanism of injury: Pt presents with right shoulder pain. Reports Ortho MD diagnosed her with right RTC tear. Unable to elevate RUE without assist from LUE. Inability to elevate RUE for the past 6 months. Pain intermittent in shoulder but constant pain in upper arm between shoulder and elbow. Reports intermittent altered sensation right thumb/wrist.  Reports crepitus in shoulder. No MRI to date. Had one injection which did not help symptoms. Patient Goals  Patient goals for therapy: decreased pain, increased motion, increased strength and independence with ADLs/IADLs    Pain  Current pain ratin  At best pain ratin  At worst pain ratin  Location: Right shoulder/upper arm  Quality: sharp  Relieving factors: rest  Aggravating factors: lifting  Progression: improved    Hand dominance: right    Treatments  Current treatment: physical therapy        Objective     Postural Observations    Additional Postural Observation Details  Forward head  Forward rounded shoulders    Tenderness     Right Shoulder  No tenderness     Active Range of Motion     Right Shoulder   Flexion: 130 degrees   Abduction: 130 degrees   External rotation BTH: C3   Internal rotation BTB: L1     Passive Range of Motion     Right Shoulder   Flexion: WFL  Abduction: WFL  Internal rotation 90°: WFL    Strength/Myotome Testing     Right Shoulder     Planes of Motion   Flexion: 4-   Abduction: 4-   External rotation at 0°: 2   Internal rotation at 0°: 4     Isolated Muscles   Biceps: 4+   Triceps: 4     Additional Strength Details  MMT in available ROM    Tests     Right Shoulder   Positive empty can. Precautions:  Hx Breast Cancer.   NO E-STIM OR ULTRASOUND       Manuals 9-8-23 8/23/23 8-28-23 8/30/23 9-5-23   PROM - Mobs right shoulder 10'  10' 10' 10' 10'                           Neuro Re-Ed         LTP/MTP Green x30 ea grn x30 ea Green x30 grn x30 ea grn x30 ea   T-band ER/IR Green IR 30x  Green ER 30x AAROM   grn IR 30x  grn ER 30x AAROM Green IR 30x  Green ER 30x  AAROM grn IR x30  grn ER AAROM grn IR 30x  grn ER 30x AAROM   T-band Add Green x30 grn x30 Green 3x10 grn 3x10 grn 3x10   T-band punches Red x30 grn x15 Green 15x grn x15 red x20   D2 extension                         Ther Ex        UBE for posture education 8' alt  8' alt  8'  alt 8' alt  8' alt    nustep L5  15' L5 15' L5  15' Not today-time constraints.   L5 15'   pulleys 30x 30x 30x  5" 30x  5"  30x 5"    Bicep curl 4# 3x10 fadumo 4# 3x10 fadumo  4# 3x10 4# 3x10 4# 3x10   Bent over row 4# 3x10 fadumo 4# 3x10 fadumo  4#  30x 4# 3x10 4# 30x   Finger ladder 15x fwd  15x fwd  15x scaption x15 scaption  15x scaption    Wall slide 10x  W/ LUE assist 10x w/LUE assist  15x w/ LUE assist 15x w/LUE assist  15x w/LUE assist    Stand table push up 3x10  3x10 3x10 3x10 3x10                   HEP        Ther Activity                        Gait Training                        Modalities        CP  10' post session R shoulder  Declined  Declined  10' post tx R shoulder

## 2023-09-12 ENCOUNTER — OFFICE VISIT (OUTPATIENT)
Dept: PHYSICAL THERAPY | Facility: CLINIC | Age: 83
End: 2023-09-12
Payer: MEDICARE

## 2023-09-12 DIAGNOSIS — M75.121 COMPLETE TEAR OF RIGHT ROTATOR CUFF, UNSPECIFIED WHETHER TRAUMATIC: ICD-10-CM

## 2023-09-12 DIAGNOSIS — G89.29 CHRONIC RIGHT SHOULDER PAIN: Primary | ICD-10-CM

## 2023-09-12 DIAGNOSIS — M25.511 CHRONIC RIGHT SHOULDER PAIN: Primary | ICD-10-CM

## 2023-09-12 PROCEDURE — 97140 MANUAL THERAPY 1/> REGIONS: CPT

## 2023-09-12 PROCEDURE — 97110 THERAPEUTIC EXERCISES: CPT

## 2023-09-12 NOTE — PROGRESS NOTES
Daily Note     Today's date: 2023  Patient name: Earlene Jeffries  : 1940  MRN: [id number]  Referring provider: Napoleon Portillo MD  Dx:   Encounter Diagnosis     ICD-10-CM    1. Chronic right shoulder pain  M25.511     G89.29       2. Complete tear of right rotator cuff, unspecified whether traumatic  M75.121                      Subjective:  I'm doing ok. Less pain in the shoulder      Objective: See treatment diary below      Assessment: Tolerated treatment well. Reports feeling shoulder strength is improving. Less overall pain with activity. Patient would benefit from continued PT      Plan: Continue per plan of care. Precautions:  Hx Breast Cancer. NO E-STIM OR ULTRASOUND       Manuals 23   PROM - Mobs right shoulder 10'  10' 10' 10' 10'                           Neuro Re-Ed         LTP/MTP Green x30 ea grn x30 ea Green x30 grn x30 ea grn x30 ea   T-band ER/IR Green IR 30x  Green ER 30x AAROM   grn IR 30x  grn ER 30x AAROM Green IR 30x  Green ER 30x  AAROM grn IR x30  grn ER AAROM grn IR 30x  grn ER 30x AAROM   T-band Add Green x30 grn x30 Green 3x10 grn 3x10 grn 3x10   T-band punches Red x30 grn x30 Green 15x grn x15 red x20   D2 extension                         Ther Ex        UBE for posture education 8' alt  8' alt  8'  alt 8' alt  8' alt    nustep L5  15' L5 15' L5  15' Not today-time constraints.   L5 15'   pulleys 30x 30x 30x  5" 30x  5"  30x 5"    Bicep curl 4# 3x10 fadumo 4# 3x10 fadumo  4# 3x10 4# 3x10 4# 3x10   Bent over row 4# 3x10 fadumo 4# 3x10 fadumo  4#  30x 4# 3x10 4# 30x   Finger ladder 15x fwd  15x fwd  15x scaption x15 scaption  15x scaption    Wall slide 10x  W/ LUE assist 10x w/LUE assist  15x w/ LUE assist 15x w/LUE assist  15x w/LUE assist    Stand table push up 3x10  3x10 3x10 3x10 3x10                   HEP        Ther Activity                        Gait Training                        Modalities        CP   Declined  Declined  10' post tx R shoulder

## 2023-09-14 ENCOUNTER — APPOINTMENT (OUTPATIENT)
Dept: LAB | Facility: HOSPITAL | Age: 83
End: 2023-09-14
Attending: INTERNAL MEDICINE
Payer: MEDICARE

## 2023-09-14 DIAGNOSIS — I10 PRIMARY HYPERTENSION: ICD-10-CM

## 2023-09-14 LAB
ANION GAP SERPL CALCULATED.3IONS-SCNC: 7 MMOL/L
BUN SERPL-MCNC: 21 MG/DL (ref 5–25)
CALCIUM SERPL-MCNC: 10.2 MG/DL (ref 8.4–10.2)
CHLORIDE SERPL-SCNC: 105 MMOL/L (ref 96–108)
CO2 SERPL-SCNC: 28 MMOL/L (ref 21–32)
CREAT SERPL-MCNC: 0.74 MG/DL (ref 0.6–1.3)
GFR SERPL CREATININE-BSD FRML MDRD: 75 ML/MIN/1.73SQ M
GLUCOSE P FAST SERPL-MCNC: 107 MG/DL (ref 65–99)
POTASSIUM SERPL-SCNC: 4.6 MMOL/L (ref 3.5–5.3)
SODIUM SERPL-SCNC: 140 MMOL/L (ref 135–147)

## 2023-09-14 PROCEDURE — 36415 COLL VENOUS BLD VENIPUNCTURE: CPT

## 2023-09-14 PROCEDURE — 80048 BASIC METABOLIC PNL TOTAL CA: CPT

## 2023-09-15 ENCOUNTER — OFFICE VISIT (OUTPATIENT)
Dept: PHYSICAL THERAPY | Facility: CLINIC | Age: 83
End: 2023-09-15
Payer: MEDICARE

## 2023-09-15 DIAGNOSIS — G89.29 CHRONIC RIGHT SHOULDER PAIN: Primary | ICD-10-CM

## 2023-09-15 DIAGNOSIS — M25.511 CHRONIC RIGHT SHOULDER PAIN: Primary | ICD-10-CM

## 2023-09-15 DIAGNOSIS — M75.121 COMPLETE TEAR OF RIGHT ROTATOR CUFF, UNSPECIFIED WHETHER TRAUMATIC: ICD-10-CM

## 2023-09-15 PROCEDURE — 97110 THERAPEUTIC EXERCISES: CPT

## 2023-09-15 PROCEDURE — 97112 NEUROMUSCULAR REEDUCATION: CPT

## 2023-09-15 PROCEDURE — 97140 MANUAL THERAPY 1/> REGIONS: CPT

## 2023-09-15 NOTE — PROGRESS NOTES
Daily Note     Today's date: 9/15/2023  Patient name: Natalia Henriquez  : 1940  MRN: [id number]  Referring provider: Jose M Zapata MD  Dx:   Encounter Diagnosis     ICD-10-CM    1. Chronic right shoulder pain  M25.511     G89.29       2. Complete tear of right rotator cuff, unspecified whether traumatic  M75.121                      Subjective: Ignacio Zamudio reports performing more exercise and reports her R shoulder tolerating this new increase in exercise. Objective: See treatment diary below      Assessment: Able to increase tband resistance for tband punches with no adverse effects and good exercise technique. Continues to need support from L UE during wall slides in order to perform. Overall improvement in R shoulder flexibility following manual stretching. Pt would continue to benefit from skilled PT. Plan: Continue with current POC to address pt deficits. Precautions:  Hx Breast Cancer. NO E-STIM OR ULTRASOUND       Manuals 9-8-23 9/12/23 9/15/23 8/30/23 9-5-23   PROM - Mobs right shoulder 10'  10' 10' 10' 10'                           Neuro Re-Ed         LTP/MTP Green x30 ea grn x30 ea grn x30 ea grn x30 ea grn x30 ea   T-band ER/IR Green IR 30x  Green ER 30x AAROM   grn IR 30x  grn ER 30x AAROM grn IR 30x  grm ER 30x AAROM  grn IR x30  grn ER AAROM grn IR 30x  grn ER 30x AAROM   T-band Add Green x30 grn x30 grn x30 grn 3x10 grn 3x10   T-band punches Red x30 grn x30 grn x30 grn x15 red x20   D2 extension                         Ther Ex        UBE for posture education 8' alt  8' alt  8' alt  8' alt  8' alt    nustep L5  15' L5 15' L5 15' Not today-time constraints.   L5 15'   pulleys 30x 30x 30x 30x  5"  30x 5"    Bicep curl 4# 3x10 fadumo 4# 3x10 fadumo  4# 3x10 fadumo  4# 3x10 4# 3x10   Bent over row 4# 3x10 fadumo 4# 3x10 fadumo  4# 3x10 fadumo  4# 3x10 4# 30x   Finger ladder 15x fwd  15x fwd  15x fwd  x15 scaption  15x scaption    Wall slide 10x  W/ LUE assist 10x w/LUE assist  10x w/LUE assist  15x w/LUE assist  15x w/LUE assist    Stand table push up 3x10  3x10 3x10 3x10 3x10                   HEP        Ther Activity                        Gait Training                        Modalities        CP   10' post tx R imelda Declined  10' post tx R shoulder

## 2023-09-19 ENCOUNTER — OFFICE VISIT (OUTPATIENT)
Dept: PHYSICAL THERAPY | Facility: CLINIC | Age: 83
End: 2023-09-19
Payer: MEDICARE

## 2023-09-19 DIAGNOSIS — G89.29 CHRONIC RIGHT SHOULDER PAIN: Primary | ICD-10-CM

## 2023-09-19 DIAGNOSIS — M75.121 COMPLETE TEAR OF RIGHT ROTATOR CUFF, UNSPECIFIED WHETHER TRAUMATIC: ICD-10-CM

## 2023-09-19 DIAGNOSIS — M25.511 CHRONIC RIGHT SHOULDER PAIN: Primary | ICD-10-CM

## 2023-09-19 PROCEDURE — 97112 NEUROMUSCULAR REEDUCATION: CPT

## 2023-09-19 PROCEDURE — 97140 MANUAL THERAPY 1/> REGIONS: CPT

## 2023-09-19 PROCEDURE — 97010 HOT OR COLD PACKS THERAPY: CPT

## 2023-09-19 PROCEDURE — 97110 THERAPEUTIC EXERCISES: CPT

## 2023-09-19 NOTE — PROGRESS NOTES
Daily Note     Today's date: 2023  Patient name: Hoa Nance  : 1940  MRN: [id number]  Referring provider: Nancy Crews MD  Dx:   Encounter Diagnosis     ICD-10-CM    1. Chronic right shoulder pain  M25.511     G89.29       2. Complete tear of right rotator cuff, unspecified whether traumatic  M75.121                      Subjective:  No new complaints    Objective: See treatment diary below      Assessment: Tolerated treatment well. Patient would benefit from continued PT      Plan: Progress treatment as tolerated. Precautions:  Hx Breast Cancer.   NO E-STIM OR ULTRASOUND       Manuals 9-8-23 9/12/23 9/15/23 9-19-23 9-5-23   PROM - Mobs right shoulder 10'  10' 10' 10' 10'                           Neuro Re-Ed         LTP/MTP Green x30 ea grn x30 ea grn x30 ea grn x30 ea grn x30 ea   T-band ER/IR Green IR 30x  Green ER 30x AAROM   grn IR 30x  grn ER 30x AAROM grn IR 30x  grm ER 30x AAROM  grn IR x30  grn ER AAROM grn IR 30x  grn ER 30x AAROM   T-band Add Green x30 grn x30 grn x30 grn 3x10 grn 3x10   T-band punches Red x30 grn x30 grn x30 grn x15 red x20   D2 extension                         Ther Ex        UBE for posture education 8' alt  8' alt  8' alt  8' alt  8' alt    nustep L5  15' L5 15' L5 15' L5 15' L5 15'   pulleys 30x 30x 30x 30x  5"  30x 5"    Bicep curl 4# 3x10 fadumo 4# 3x10 fadumo  4# 3x10 fadumo  4# 3x10 4# 3x10   Bent over row 4# 3x10 fadumo 4# 3x10 fadumo  4# 3x10 fadumo  4# 3x10 4# 30x   Finger ladder 15x fwd  15x fwd  15x fwd  x15 scaption  15x scaption    Wall slide 10x  W/ LUE assist 10x w/LUE assist  10x w/LUE assist  15x w/LUE assist  15x w/LUE assist    Stand table push up 3x10  3x10 3x10 3x10 3x10                   HEP        Ther Activity                        Gait Training                        Modalities        CP   10' post tx R shoudler 10' post tx 10' post tx R shoulder

## 2023-09-21 ENCOUNTER — APPOINTMENT (OUTPATIENT)
Dept: PHYSICAL THERAPY | Facility: CLINIC | Age: 83
End: 2023-09-21
Payer: MEDICARE

## 2023-11-01 ENCOUNTER — OFFICE VISIT (OUTPATIENT)
Dept: URGENT CARE | Facility: CLINIC | Age: 83
End: 2023-11-01
Payer: MEDICARE

## 2023-11-01 ENCOUNTER — APPOINTMENT (OUTPATIENT)
Dept: RADIOLOGY | Facility: CLINIC | Age: 83
End: 2023-11-01
Payer: MEDICARE

## 2023-11-01 VITALS
OXYGEN SATURATION: 98 % | WEIGHT: 171 LBS | SYSTOLIC BLOOD PRESSURE: 112 MMHG | DIASTOLIC BLOOD PRESSURE: 68 MMHG | HEART RATE: 63 BPM | TEMPERATURE: 97 F | BODY MASS INDEX: 29.35 KG/M2 | RESPIRATION RATE: 16 BRPM

## 2023-11-01 DIAGNOSIS — M79.605 LEFT LEG PAIN: ICD-10-CM

## 2023-11-01 DIAGNOSIS — T14.8XXA WOUND INFECTION: Primary | ICD-10-CM

## 2023-11-01 DIAGNOSIS — L08.9 WOUND INFECTION: Primary | ICD-10-CM

## 2023-11-01 PROCEDURE — 73590 X-RAY EXAM OF LOWER LEG: CPT

## 2023-11-01 PROCEDURE — G0463 HOSPITAL OUTPT CLINIC VISIT: HCPCS | Performed by: PHYSICIAN ASSISTANT

## 2023-11-01 PROCEDURE — 99213 OFFICE O/P EST LOW 20 MIN: CPT | Performed by: PHYSICIAN ASSISTANT

## 2023-11-01 RX ORDER — CEPHALEXIN 500 MG/1
500 CAPSULE ORAL EVERY 12 HOURS SCHEDULED
Qty: 14 CAPSULE | Refills: 0 | Status: SHIPPED | OUTPATIENT
Start: 2023-11-01 | End: 2023-11-08

## 2023-11-01 NOTE — PATIENT INSTRUCTIONS
Wound Infection   AMBULATORY CARE:   A wound infection  occurs when bacteria enters a break in the skin. The infection may involve just the skin, or affect deeper tissues or organs close to the wound. Signs and symptoms of a wound infection:  Your symptoms may start a few days after you get the wound, or may not occur for a month or two after the wound happens:  Fever    Warm, red, painful, or swollen skin near the wound    Blood or pus coming from the wound    A foul odor coming from the wound    Seek care immediately if:   You feel short of breath. Your heart is beating faster than usual.    You feel confused. Blood soaks through your bandages. Your wound comes apart or feels like it is ripping. You have severe pain. You see red streaks coming from the infected area. Call your doctor if:   You have a fever or chills. You have more pain, redness, or swelling near your wound. Your symptoms do not improve. The skin around your wound feels numb. You have questions or concerns about your condition or care. Treatment for a wound infection  will depend on how severe the wound is, its location, and whether other areas are affected. It may also depend on your health and the length of time you have had the wound. Ask your provider about these and other treatments you may need:  Medicine  will be given to treat the infection and decrease pain and swelling. Wound care  may be done to clean your wound and help it heal. A wound vacuum may also be placed over your wound to help it heal.    Hyperbaric oxygen therapy  (HBO) may be used to get more oxygen to your tissues to help them heal. The pressurized oxygen is given as you sit in a pressure chamber. Surgery  may be needed to clean the wound or remove infected or dead tissue. Surgery may also be needed to remove a foreign object. Care for your wound as directed:  Keep your wound clean and dry.  You may need to cover your wound when you bathe so it does not get wet. Clean your wound as directed with soap and water or wound . Put on new, clean bandages as directed. Change your bandages when they get wet or dirty. Help your wound heal:   Eat a variety of healthy foods. Examples include fruits, vegetables, whole-grain breads, low-fat dairy products, beans, lean meats, and fish. Healthy foods may help you heal faster. You may also need to take vitamins and minerals. Ask if you need to be on a special diet. Manage other health conditions. Follow your provider's directions to manage health conditions that can cause slow wound healing. Examples include high blood pressure and diabetes. Do not smoke. Nicotine and other chemicals in cigarettes and cigars can cause slow wound healing. Ask your provider for information if you currently smoke and need help to quit. E-cigarettes or smokeless tobacco still contain nicotine. Talk to your provider before you use these products. Follow up with your doctor in 1 to 2 days:  Write down your questions so you remember to ask them during your visits. © Copyright [employer] 2023 Information is for End User's use only and may not be sold, redistributed or otherwise used for commercial purposes. The above information is an  only. It is not intended as medical advice for individual conditions or treatments. Talk to your doctor, nurse or pharmacist before following any medical regimen to see if it is safe and effective for you.
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
## 2023-11-17 ENCOUNTER — RA CDI HCC (OUTPATIENT)
Dept: OTHER | Facility: HOSPITAL | Age: 83
End: 2023-11-17

## 2023-11-21 ENCOUNTER — OFFICE VISIT (OUTPATIENT)
Dept: FAMILY MEDICINE CLINIC | Facility: CLINIC | Age: 83
End: 2023-11-21
Payer: MEDICARE

## 2023-11-21 VITALS
OXYGEN SATURATION: 100 % | WEIGHT: 171.2 LBS | BODY MASS INDEX: 29.23 KG/M2 | DIASTOLIC BLOOD PRESSURE: 86 MMHG | HEIGHT: 64 IN | HEART RATE: 67 BPM | TEMPERATURE: 97.8 F | SYSTOLIC BLOOD PRESSURE: 124 MMHG

## 2023-11-21 DIAGNOSIS — R53.83 OTHER FATIGUE: ICD-10-CM

## 2023-11-21 DIAGNOSIS — Z00.00 MEDICARE ANNUAL WELLNESS VISIT, SUBSEQUENT: ICD-10-CM

## 2023-11-21 DIAGNOSIS — R73.01 IMPAIRED FASTING GLUCOSE: ICD-10-CM

## 2023-11-21 DIAGNOSIS — Z13.1 SCREENING FOR DIABETES MELLITUS: ICD-10-CM

## 2023-11-21 DIAGNOSIS — G89.29 CHRONIC RIGHT SHOULDER PAIN: Primary | ICD-10-CM

## 2023-11-21 DIAGNOSIS — Z17.0 MALIGNANT NEOPLASM OF UPPER-OUTER QUADRANT OF RIGHT BREAST IN FEMALE, ESTROGEN RECEPTOR POSITIVE: ICD-10-CM

## 2023-11-21 DIAGNOSIS — M25.511 CHRONIC RIGHT SHOULDER PAIN: Primary | ICD-10-CM

## 2023-11-21 DIAGNOSIS — E55.9 VITAMIN D DEFICIENCY: ICD-10-CM

## 2023-11-21 DIAGNOSIS — C50.411 MALIGNANT NEOPLASM OF UPPER-OUTER QUADRANT OF RIGHT BREAST IN FEMALE, ESTROGEN RECEPTOR POSITIVE: ICD-10-CM

## 2023-11-21 DIAGNOSIS — D69.6 PLATELETS DECREASED (HCC): ICD-10-CM

## 2023-11-21 DIAGNOSIS — E78.5 DYSLIPIDEMIA, GOAL LDL BELOW 130: ICD-10-CM

## 2023-11-21 DIAGNOSIS — D64.9 ANEMIA, UNSPECIFIED TYPE: ICD-10-CM

## 2023-11-21 PROBLEM — U07.1 COVID-19: Status: RESOLVED | Noted: 2022-07-01 | Resolved: 2023-11-21

## 2023-11-21 PROBLEM — L98.9 LEG SKIN LESION, LEFT: Status: RESOLVED | Noted: 2022-05-11 | Resolved: 2023-11-21

## 2023-11-21 PROCEDURE — G0439 PPPS, SUBSEQ VISIT: HCPCS | Performed by: FAMILY MEDICINE

## 2023-11-21 PROCEDURE — 99213 OFFICE O/P EST LOW 20 MIN: CPT | Performed by: FAMILY MEDICINE

## 2023-11-21 NOTE — PATIENT INSTRUCTIONS
Medicare Preventive Visit Patient Instructions  Thank you for completing your Welcome to Medicare Visit or Medicare Annual Wellness Visit today. Your next wellness visit will be due in one year (11/21/2024). The screening/preventive services that you may require over the next 5-10 years are detailed below. Some tests may not apply to you based off risk factors and/or age. Screening tests ordered at today's visit but not completed yet may show as past due. Also, please note that scanned in results may not display below. Preventive Screenings:  Service Recommendations Previous Testing/Comments   Colorectal Cancer Screening  * Colonoscopy    * Fecal Occult Blood Test (FOBT)/Fecal Immunochemical Test (FIT)  * Fecal DNA/Cologuard Test  * Flexible Sigmoidoscopy Age: 43-73 years old   Colonoscopy: every 10 years (may be performed more frequently if at higher risk)  OR  FOBT/FIT: every 1 year  OR  Cologuard: every 3 years  OR  Sigmoidoscopy: every 5 years  Screening may be recommended earlier than age 39 if at higher risk for colorectal cancer. Also, an individualized decision between you and your healthcare provider will decide whether screening between the ages of 77-80 would be appropriate. Colonoscopy: Not on file  FOBT/FIT: Not on file  Cologuard: Not on file  Sigmoidoscopy: Not on file          Breast Cancer Screening Age: 36 years old  Frequency: every 1-2 years  Not required if history of left and right mastectomy Mammogram: 06/08/2023    History Breast Cancer   Cervical Cancer Screening Between the ages of 21-29, pap smear recommended once every 3 years. Between the ages of 32-69, can perform pap smear with HPV co-testing every 5 years.    Recommendations may differ for women with a history of total hysterectomy, cervical cancer, or abnormal pap smears in past. Pap Smear: Not on file    Screening Not Indicated   Hepatitis C Screening Once for adults born between 1945 and 1965  More frequently in patients at high risk for Hepatitis C Hep C Antibody: Not on file        Diabetes Screening 1-2 times per year if you're at risk for diabetes or have pre-diabetes Fasting glucose: 107 mg/dL (9/14/2023)  A1C: 5.5 % (11/15/2022)  Screening Current   Cholesterol Screening Once every 5 years if you don't have a lipid disorder. May order more often based on risk factors. Lipid panel: 11/15/2022    Screening Not Indicated  History Lipid Disorder     Other Preventive Screenings Covered by Medicare:  Abdominal Aortic Aneurysm (AAA) Screening: covered once if your at risk. You're considered to be at risk if you have a family history of AAA. Lung Cancer Screening: covers low dose CT scan once per year if you meet all of the following conditions: (1) Age 48-67; (2) No signs or symptoms of lung cancer; (3) Current smoker or have quit smoking within the last 15 years; (4) You have a tobacco smoking history of at least 20 pack years (packs per day multiplied by number of years you smoked); (5) You get a written order from a healthcare provider. Glaucoma Screening: covered annually if you're considered high risk: (1) You have diabetes OR (2) Family history of glaucoma OR (3)  aged 48 and older OR (3)  American aged 72 and older  Osteoporosis Screening: covered every 2 years if you meet one of the following conditions: (1) You're estrogen deficient and at risk for osteoporosis based off medical history and other findings; (2) Have a vertebral abnormality; (3) On glucocorticoid therapy for more than 3 months; (4) Have primary hyperparathyroidism; (5) On osteoporosis medications and need to assess response to drug therapy. Last bone density test (DXA Scan): 10/12/2022. HIV Screening: covered annually if you're between the age of 14-79. Also covered annually if you are younger than 13 and older than 72 with risk factors for HIV infection.  For pregnant patients, it is covered up to 3 times per pregnancy. Immunizations:  Immunization Recommendations   Influenza Vaccine Annual influenza vaccination during flu season is recommended for all persons aged >= 6 months who do not have contraindications   Pneumococcal Vaccine   * Pneumococcal conjugate vaccine = PCV13 (Prevnar 13), PCV15 (Vaxneuvance), PCV20 (Prevnar 20)  * Pneumococcal polysaccharide vaccine = PPSV23 (Pneumovax) Adults 45-28 yo with certain risk factors or if 69+ yo  If never received any pneumonia vaccine: recommend Prevnar 20 (PCV20)  Give PCV20 if previously received 1 dose of PCV13 or PPSV23   Hepatitis B Vaccine 3 dose series if at intermediate or high risk (ex: diabetes, end stage renal disease, liver disease)   Respiratory syncytial virus (RSV) Vaccine - COVERED BY MEDICARE PART D  * RSVPreF3 (Arexvy) CDC recommends that adults 61years of age and older may receive a single dose of RSV vaccine using shared clinical decision-making (SCDM)   Tetanus (Td) Vaccine - COST NOT COVERED BY MEDICARE PART B Following completion of primary series, a booster dose should be given every 10 years to maintain immunity against tetanus. Td may also be given as tetanus wound prophylaxis. Tdap Vaccine - COST NOT COVERED BY MEDICARE PART B Recommended at least once for all adults. For pregnant patients, recommended with each pregnancy. Shingles Vaccine (Shingrix) - COST NOT COVERED BY MEDICARE PART B  2 shot series recommended in those 19 years and older who have or will have weakened immune systems or those 50 years and older     Health Maintenance Due:      Topic Date Due   • DXA SCAN  10/12/2024     Immunizations Due:      Topic Date Due   • COVID-19 Vaccine (6 - Moderna series) 11/28/2022   • Influenza Vaccine (1) 09/01/2023     Advance Directives   What are advance directives? Advance directives are legal documents that state your wishes and plans for medical care.  These plans are made ahead of time in case you lose your ability to make decisions for yourself. Advance directives can apply to any medical decision, such as the treatments you want, and if you want to donate organs. What are the types of advance directives? There are many types of advance directives, and each state has rules about how to use them. You may choose a combination of any of the following:  Living will: This is a written record of the treatment you want. You can also choose which treatments you do not want, which to limit, and which to stop at a certain time. This includes surgery, medicine, IV fluid, and tube feedings. Durable power of  for [facility]): This is a written record that states who you want to make healthcare choices for you when you are unable to make them for yourself. This person, called a proxy, is usually a family member or a friend. You may choose more than 1 proxy. Do not resuscitate (DNR) order:  A DNR order is used in case your heart stops beating or you stop breathing. It is a request not to have certain forms of treatment, such as CPR. A DNR order may be included in other types of advance directives. Medical directive: This covers the care that you want if you are in a coma, near death, or unable to make decisions for yourself. You can list the treatments you want for each condition. Treatment may include pain medicine, surgery, blood transfusions, dialysis, IV or tube feedings, and a ventilator (breathing machine). Values history: This document has questions about your views, beliefs, and how you feel and think about life. This information can help others choose the care that you would choose. Why are advance directives important? An advance directive helps you control your care. Although spoken wishes may be used, it is better to have your wishes written down. Spoken wishes can be misunderstood, or not followed. Treatments may be given even if you do not want them.  An advance directive may make it easier for your family to make difficult choices about your care. Weight Management   Why it is important to manage your weight:  Being overweight increases your risk of health conditions such as heart disease, high blood pressure, type 2 diabetes, and certain types of cancer. It can also increase your risk for osteoarthritis, sleep apnea, and other respiratory problems. Aim for a slow, steady weight loss. Even a small amount of weight loss can lower your risk of health problems. How to lose weight safely:  A safe and healthy way to lose weight is to eat fewer calories and get regular exercise. You can lose up about 1 pound a week by decreasing the number of calories you eat by 500 calories each day. Healthy meal plan for weight management:  A healthy meal plan includes a variety of foods, contains fewer calories, and helps you stay healthy. A healthy meal plan includes the following:  Eat whole-grain foods more often. A healthy meal plan should contain fiber. Fiber is the part of grains, fruits, and vegetables that is not broken down by your body. Whole-grain foods are healthy and provide extra fiber in your diet. Some examples of whole-grain foods are whole-wheat breads and pastas, oatmeal, brown rice, and bulgur. Eat a variety of vegetables every day. Include dark, leafy greens such as spinach, kale, bronson greens, and mustard greens. Eat yellow and orange vegetables such as carrots, sweet potatoes, and winter squash. Eat a variety of fruits every day. Choose fresh or canned fruit (canned in its own juice or light syrup) instead of juice. Fruit juice has very little or no fiber. Eat low-fat dairy foods. Drink fat-free (skim) milk or 1% milk. Eat fat-free yogurt and low-fat cottage cheese. Try low-fat cheeses such as mozzarella and other reduced-fat cheeses. Choose meat and other protein foods that are low in fat. Choose beans or other legumes such as split peas or lentils.  Choose fish, skinless poultry (chicken or turkey), or lean cuts of red meat (beef or pork). Before you cook meat or poultry, cut off any visible fat. Use less fat and oil. Try baking foods instead of frying them. Add less fat, such as margarine, sour cream, regular salad dressing and mayonnaise to foods. Eat fewer high-fat foods. Some examples of high-fat foods include french fries, doughnuts, ice cream, and cakes. Eat fewer sweets. Limit foods and drinks that are high in sugar. This includes candy, cookies, regular soda, and sweetened drinks. Exercise:  Exercise at least 30 minutes per day on most days of the week. Some examples of exercise include walking, biking, dancing, and swimming. You can also fit in more physical activity by taking the stairs instead of the elevator or parking farther away from stores. Ask your healthcare provider about the best exercise plan for you. © Copyright 20:20 [employer] 2018 Information is for End User's use only and may not be sold, redistributed or otherwise used for commercial purposes.  All illustrations and images included in CareNotes® are the copyrighted property of [employer] Inc[employer] or [address] [place] [address]

## 2023-11-21 NOTE — PROGRESS NOTES
BMI Counseling: Body mass index is 29.39 kg/m². The BMI is above normal. Nutrition recommendations include reducing portion sizes, decreasing overall calorie intake, and 3-5 servings of fruits/vegetables daily. Exercise recommendations include exercising 3-5 times per week. Assessment and Plan:     Problem List Items Addressed This Visit    None    BMI Counseling: Body mass index is 29.39 kg/m². The BMI is above normal. Exercise recommendations include exercising 3-5 times per week. Depression Screening and Follow-up Plan: Patient was screened for depression during today's encounter. They screened negative with a PHQ-2 score of 0. Preventive health issues were discussed with patient, and age appropriate screening tests were ordered as noted in patient's After Visit Summary. Personalized health advice and appropriate referrals for health education or preventive services given if needed, as noted in patient's After Visit Summary.      History of Present Illness:     Patient presents for a Medicare Wellness Visit    HPI   Patient Care Team:  Coco Montes MD as PCP - General (Family Medicine)     Review of Systems:     Review of Systems     Problem List:     Patient Active Problem List   Diagnosis    Malignant neoplasm of upper-outer quadrant of right breast in female, estrogen receptor positive     Essential hypertension    Bradycardia    Platelets decreased ([address])    Leg skin lesion, left    Senile nuclear cataract    POAG (primary open-angle glaucoma)    History of basal cell cancer    HCVD (hypertensive cardiovascular disease)    Dyslipidemia, goal LDL below 130    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    COVID-19      Past Medical and Surgical History:     Past Medical History:   Diagnosis Date    Breast cancer ([address]) 2021    treated with XRT and lumpectomy right sided    Hyperlipidemia, unspecified     Hypertension     Rotator cuff injury     Right     Past Surgical History:   Procedure Laterality Date    BREAST LUMPECTOMY Right 2021    CATARACT EXTRACTION Bilateral     HYSTERECTOMY        Family History:     Family History   Problem Relation Age of Onset    Heart disease Mother     Heart disease Father     Heart disease Sister       Social History:     Social History     Socioeconomic History    Marital status: /Civil Union     Spouse name: None    Number of children: None    Years of education: None    Highest education level: None   Occupational History    None   Tobacco Use    Smoking status: Never    Smokeless tobacco: Never   Vaping Use    Vaping Use: Never used   Substance and Sexual Activity    Alcohol use: Yes     Comment: occasionally    Drug use: Not Currently    Sexual activity: None   Other Topics Concern    None   Social History Narrative    None     Social Determinants of Health     Financial Resource Strain: Low Risk  (11/15/2022)    Overall Financial Resource Strain (CARDIA)     Difficulty of Paying Living Expenses: Not hard at all   Food Insecurity: Not on file   Transportation Needs: No Transportation Needs (11/15/2022)    PRAPARE - Transportation     Lack of Transportation (Medical): No     Lack of Transportation (Non-Medical):  No   Physical Activity: Not on file   Stress: Not on file   Social Connections: Not on file   Intimate Partner Violence: Not on file   Housing Stability: Not on file      Medications and Allergies:     Current Outpatient Medications   Medication Sig Dispense Refill    anastrozole (ARIMIDEX) 1 mg tablet Take 1 mg by mouth daily      aspirin (ECOTRIN LOW STRENGTH) 81 mg EC tablet Take 81 mg by mouth      atorvastatin (LIPITOR) 20 mg tablet take 1 tablet by mouth every morning 90 tablet 3    Calcium-Phosphorus-Vitamin D (Citracal +D3) 250-107-500 MG-MG-UNIT CHEW Chew      dorzolamide-timolol (COSOPT) 22.3-6.8 MG/ML ophthalmic solution 1 drop 8 am and 8 pm both eyes      hydrochlorothiazide (HYDRODIURIL) 12.5 mg tablet take 1 tablet by mouth every morning 90 tablet 3    Multiple Vitamin (multivitamin) capsule Take 1 capsule by mouth daily      ramipril (ALTACE) 10 MG capsule Take 1 capsule (10 mg total) by mouth every 12 (twelve) hours 90 capsule 3    travoprost (TRAVATAN-Z) 0.004 % ophthalmic solution instill 1 drop into both eyes BEFORE BEDTIME      fluorouracil (EFUDEX) 5 % cream Apply to affected area (scalp and left cheek) twice daily for 3-4 weeks (Patient not taking: Reported on 11/21/2023)       No current facility-administered medications for this visit. Allergies   Allergen Reactions    Brimonidine Other (See Comments)     Eye lashes fell off      Immunizations:     Immunization History   Administered Date(s) Administered    COVID-19 MODERNA VACC 0.5 ML IM 01/11/2021, 02/08/2021, 09/18/2021, 04/18/2022    INFLUENZA 10/28/2013, 10/03/2014, 11/16/2015, 10/31/2017, 10/17/2018, 10/14/2022    Influenza Quadrivalent 3 years and older 10/28/2013, 10/03/2014, 11/16/2015, 10/31/2017, 10/17/2018    Influenza Quadrivalent Preservative Free 3 years and older IM 12/09/2016    Influenza, Seasonal Vaccine, Quadrivalent, Adjuvanted, .5e 09/30/2020    Influenza, high dose seasonal 0.7 mL 09/30/2020, 09/18/2021    Pneumococcal Conjugate 13-Valent 12/12/2019    Pneumococcal Polysaccharide PPV23 10/29/2012    Tdap 08/19/2014, 09/30/2020    Zoster 10/17/2018    Zoster Vaccine Recombinant 07/20/2018, 10/17/2018, 03/13/2019    influenza, trivalent, adjuvanted 10/17/2018, 12/12/2019      Health Maintenance:         Topic Date Due    DXA SCAN  10/12/2024         Topic Date Due    COVID-19 Vaccine (6 - Moderna series) 11/28/2022    Influenza Vaccine (1) 09/01/2023      Medicare Screening Tests and Risk Assessments:     Tyler Amaro is here for her Subsequent Wellness visit. Health Risk Assessment:   Patient feels that their physical health rating is same. Patient is very satisfied with their life. Eyesight was rated as same. Hearing was rated as same.  Patient feels that their emotional and mental health rating is same. Patients states they are never, rarely angry. Patient states they are sometimes unusually tired/fatigued. Pain experienced in the last 7 days has been some. Patient's pain rating has been 3/10. Patient states that she has experienced no weight loss or gain in last 6 months. Depression Screening:   PHQ-2 Score: 0      Fall Risk Screening: In the past year, patient has experienced: no history of falling in past year      Urinary Incontinence Screening:   Patient has not leaked urine accidently in the last six months. Home Safety:  Patient has trouble with stairs inside or outside of their home. Patient has working smoke alarms and has working carbon monoxide detector. Home safety hazards include: none. Nutrition:   Current diet is Regular. Medications:   Patient is not currently taking any over-the-counter supplements. Patient is able to manage medications. Activities of Daily Living (ADLs)/Instrumental Activities of Daily Living (IADLs):   Walk and transfer into and out of bed and chair?: Yes  Dress and groom yourself?: Yes    Bathe or shower yourself?: Yes    Feed yourself?  Yes  Do your laundry/housekeeping?: Yes  Manage your money, pay your bills and track your expenses?: Yes  Make your own meals?: Yes    Do your own shopping?: Yes    Previous Hospitalizations:   Any hospitalizations or ED visits within the last 12 months?: No      PREVENTIVE SCREENINGS      Cardiovascular Screening:    General: Screening Not Indicated and History Lipid Disorder      Diabetes Screening:     General: Screening Current      Colorectal Cancer Screening:     General: Screening Not Indicated      Breast Cancer Screening:     General: History Breast Cancer and Screening Current      Cervical Cancer Screening:    General: Screening Not Indicated      Osteoporosis Screening:    General: Screening Current      Abdominal Aortic Aneurysm (AAA) Screening:        General: Screening Not Indicated      Lung Cancer Screening:     General: Screening Not Indicated      Hepatitis C Screening:    General: Screening Not Indicated    Screening, Brief Intervention, and Referral to Treatment (SBIRT)    Screening  Typical number of drinks in a day: 0    Single Item Drug Screening:  How often have you used an illegal drug (including marijuana) or a prescription medication for non-medical reasons in the past year? never    Single Item Drug Screen Score: 0  Interpretation: Negative screen for possible drug use disorder    No results found.      Physical Exam:     /86 (BP Location: Left arm, Patient Position: Sitting)   Pulse 67   Temp 97.8 °F (36.6 °C) (Temporal)   Ht 5' 4" (1.626 m)   Wt 77.7 kg (171 lb 3.2 oz)   SpO2 100%   BMI 29.39 kg/m²     Physical Exam     Gonzalez Hogan MD

## 2023-11-21 NOTE — PROGRESS NOTES
Name: Keely Chaney      : 1940      MRN: [id number]  Encounter Provider: Gladys Larose MD  Encounter Date: 2023   Encounter department: [address] [place] [address]     1. Chronic right shoulder pain  -     Ambulatory Referral to Physical Therapy; Future    2. Platelets decreased ([address])  [facility] normal count    3. Malignant neoplasm of upper-outer quadrant of right breast in female, estrogen receptor positive   In remission    4. Dyslipidemia, goal LDL below 130  -     Lipid panel; Future    5. Screening for diabetes mellitus  -     Comprehensive metabolic panel; Future    6. Anemia, unspecified type  -     CBC and differential; Future    7. Other fatigue  -     TSH, 3rd generation with Free T4 reflex; Future    8. Vitamin D deficiency  -     Vitamin D 25 hydroxy; Future    9. Impaired fasting glucose  -     Hemoglobin A1C; Future    10. Medicare annual wellness visit, subsequent  See Medicare wellness note    Follow up in 1 year      Depression Screening and Follow-up Plan: Patient was screened for depression during today's encounter. They screened negative with a PHQ-2 score of 0. Subjective     Patient has a Hx of right shoulder pain, rotator cuff. Has been having some pain, did PT in the past which helped. Also has low platelets denies any bleeding episodes. Has a Hx of breast cancer in remission, takes anastrozole. Had a mammography done this year normal.      Review of Systems   Constitutional:  Negative for activity change, appetite change, fatigue and fever. HENT:  Negative for congestion and ear discharge. Respiratory:  Negative for cough and shortness of breath. Cardiovascular:  Negative for chest pain and palpitations. Gastrointestinal:  Negative for diarrhea and nausea. Musculoskeletal:  Negative for arthralgias and back pain. Skin:  Negative for color change and rash. Neurological:  Negative for dizziness and headaches. Psychiatric/Behavioral:  Negative for agitation and behavioral problems. Past Medical History:   Diagnosis Date    Breast cancer ([address]) 2021    treated with XRT and lumpectomy right sided    Hyperlipidemia, unspecified     Hypertension     Rotator cuff injury     Right     Past Surgical History:   Procedure Laterality Date    BREAST LUMPECTOMY Right 2021    CATARACT EXTRACTION Bilateral     HYSTERECTOMY       Family History   Problem Relation Age of Onset    Heart disease Mother     Heart disease Father     Heart disease Sister      Social History     Socioeconomic History    Marital status: /Civil Union     Spouse name: None    Number of children: None    Years of education: None    Highest education level: None   Occupational History    None   Tobacco Use    Smoking status: Never    Smokeless tobacco: Never   Vaping Use    Vaping Use: Never used   Substance and Sexual Activity    Alcohol use: Yes     Comment: occasionally    Drug use: Not Currently    Sexual activity: None   Other Topics Concern    None   Social History Narrative    None     Social Determinants of Health     Financial Resource Strain: Low Risk  (11/21/2023)    Overall Financial Resource Strain (CARDIA)     Difficulty of Paying Living Expenses: Not very hard   Food Insecurity: Not on file   Transportation Needs: No Transportation Needs (11/21/2023)    PRAPARE - Transportation     Lack of Transportation (Medical): No     Lack of Transportation (Non-Medical):  No   Physical Activity: Not on file   Stress: Not on file   Social Connections: Not on file   Intimate Partner Violence: Not on file   Housing Stability: Not on file     Current Outpatient Medications on File Prior to Visit   Medication Sig    anastrozole (ARIMIDEX) 1 mg tablet Take 1 mg by mouth daily    aspirin (ECOTRIN LOW STRENGTH) 81 mg EC tablet Take 81 mg by mouth    atorvastatin (LIPITOR) 20 mg tablet take 1 tablet by mouth every morning    Calcium-Phosphorus-Vitamin D (Citracal +D3) 250-107-500 MG-MG-UNIT CHEW Chew    dorzolamide-timolol (COSOPT) 22.3-6.8 MG/ML ophthalmic solution 1 drop 8 am and 8 pm both eyes    hydrochlorothiazide (HYDRODIURIL) 12.5 mg tablet take 1 tablet by mouth every morning    Multiple Vitamin (multivitamin) capsule Take 1 capsule by mouth daily    ramipril (ALTACE) 10 MG capsule Take 1 capsule (10 mg total) by mouth every 12 (twelve) hours    travoprost (TRAVATAN-Z) 0.004 % ophthalmic solution instill 1 drop into both eyes BEFORE BEDTIME    fluorouracil (EFUDEX) 5 % cream Apply to affected area (scalp and left cheek) twice daily for 3-4 weeks (Patient not taking: Reported on 11/21/2023)     Allergies   Allergen Reactions    Brimonidine Other (See Comments)     Eye lashes fell off     Immunization History   Administered Date(s) Administered    COVID-19 MODERNA VACC 0.5 ML IM 01/11/2021, 02/08/2021, 09/18/2021, 04/18/2022    INFLUENZA 10/28/2013, 10/03/2014, 11/16/2015, 10/31/2017, 10/17/2018, 10/14/2022, 10/12/2023    Influenza Quadrivalent 3 years and older 10/28/2013, 10/03/2014, 11/16/2015, 10/31/2017, 10/17/2018    Influenza Quadrivalent Preservative Free 3 years and older IM 12/09/2016    Influenza, Seasonal Vaccine, Quadrivalent, Adjuvanted, .5e 09/30/2020    Influenza, high dose seasonal 0.7 mL 09/30/2020, 09/18/2021    Pneumococcal Conjugate 13-Valent 12/12/2019    Pneumococcal Polysaccharide PPV23 10/29/2012    Tdap 08/19/2014, 09/30/2020    Zoster 10/17/2018    Zoster Vaccine Recombinant 07/20/2018, 10/17/2018, 03/13/2019    influenza, trivalent, adjuvanted 10/17/2018, 12/12/2019       Objective     /86 (BP Location: Left arm, Patient Position: Sitting)   Pulse 67   Temp 97.8 °F (36.6 °C) (Temporal)   Ht 5' 4" (1.626 m)   Wt 77.7 kg (171 lb 3.2 oz)   SpO2 100%   BMI 29.39 kg/m²     Physical Exam  Constitutional:       General: She is not in acute distress. Appearance: She is well-developed. She is not diaphoretic.    HENT: Head: Normocephalic and atraumatic. Nose: Nose normal.   Eyes:      Conjunctiva/sclera: Conjunctivae normal.      Pupils: Pupils are equal, round, and reactive to light. Cardiovascular:      Rate and Rhythm: Normal rate and regular rhythm. Heart sounds: Normal heart sounds. No murmur heard. Pulmonary:      Effort: Pulmonary effort is normal. No respiratory distress. Breath sounds: Normal breath sounds. No wheezing. Abdominal:      General: Bowel sounds are normal. There is no distension. Palpations: Abdomen is soft. Tenderness: There is no abdominal tenderness. Skin:     General: Skin is warm and dry. Findings: No erythema or rash. Neurological:      Mental Status: She is alert and oriented to person, place, and time.        Aristeo Liao MD [facility] Text: This medication is contraindicated in pregnancy and when breast feeding.

## 2023-12-05 ENCOUNTER — EVALUATION (OUTPATIENT)
Dept: PHYSICAL THERAPY | Facility: CLINIC | Age: 83
End: 2023-12-05
Payer: MEDICARE

## 2023-12-05 DIAGNOSIS — G89.29 CHRONIC RIGHT SHOULDER PAIN: Primary | ICD-10-CM

## 2023-12-05 DIAGNOSIS — M75.101 ROTATOR CUFF TEAR, NON-TRAUMATIC, RIGHT: ICD-10-CM

## 2023-12-05 DIAGNOSIS — M25.511 CHRONIC RIGHT SHOULDER PAIN: Primary | ICD-10-CM

## 2023-12-05 PROCEDURE — 97162 PT EVAL MOD COMPLEX 30 MIN: CPT

## 2023-12-05 PROCEDURE — 97110 THERAPEUTIC EXERCISES: CPT

## 2023-12-05 NOTE — LETTER
2023    Gladys Larose, [address]    Patient: Keely Chaney   YOB: 1940   Date of Visit: 2023     Encounter Diagnosis     ICD-10-CM    1. Chronic right shoulder pain  M25.511 Ambulatory Referral to Physical Therapy    G89.29       2. Rotator cuff tear, non-traumatic, right  M75.101           Dear Dr. Abhi Knowles:    Thank you for your recent referral of Keely Chaney. Please review the attached evaluation summary from Lori's recent visit. Please verify that you agree with the plan of care by signing the attached order. If you have any questions or concerns, please do not hesitate to call. I sincerely appreciate the opportunity to share in the care of one of your patients and hope to have another opportunity to work with you in the near future. Sincerely,    Richard Mccormick, PT      Referring Provider:      I certify that I have read the below Plan of Care and certify the need for these services furnished under this plan of treatment while under my care. Gladys Larose MD  [address] [place] [address]  [facility] [place]          PT Evaluation     Today's date: 2023  Patient name: Keely Chaney  : 1940  MRN: [id number]  Referring provider: Gladys Larose MD  Dx:   Encounter Diagnosis     ICD-10-CM    1. Chronic right shoulder pain  M25.511     G89.29       2. Rotator cuff tear, non-traumatic, right  M75.101                      Assessment  Assessment details: Patient is an 81year old female presenting to this facility with complaints of pain in R shoulder that has been going on for a while. She has been treated for this same problem in the past and PT did help her. She has not been doing as much activity lately and her function of right shoulder has declined. Patient reports she does have imaging confirming chronic RTC tear.  Upon evaluation, patient demonstrates significant RUE strength deficits, ROM deficits, and decreased functional mobility. Patient reports increased pain with fwd elevation of RUE. R scapular winging also observed with postural assessment. Patient would benefit from skilled physical therapy to address deficits found in todays evaluation in order to improve functional mobility, independence with ADLs, and pain/discomfort with all activities. Impairments: abnormal muscle tone, abnormal or restricted ROM, abnormal movement, impaired physical strength, lacks appropriate home exercise program, pain with function and poor body mechanics  Understanding of Dx/Px/POC: good   Prognosis: fair    Goals  ST. Decrease pain 50% 6 wk  2. Increase shoulder ROM by 10 degrees 6 wk  3. Increase shoulder strength to 4-/5 6 wk  4. Pt will report no difficulty with activity below shoulder level 6 wk    LT. Pt will report no pain 12 wk  2. Increase shoulder ROM 15 degrees 12 wk  3. Increase shoulder strength to 4/5 12 wk  4. Pt will report no limitations with ADL's 12 wk    Plan  Patient would benefit from: PT eval and skilled physical therapy  Planned modality interventions: cryotherapy  Planned therapy interventions: flexibility, functional ROM exercises, ADL training, graded exercise, home exercise program, manual therapy, neuromuscular re-education, patient education, postural training, strengthening, stretching, self care, therapeutic activities and therapeutic exercise  Frequency: 3x week  Duration in weeks: 12  Treatment plan discussed with: patient      Subjective Evaluation    History of Present Illness  Mechanism of injury: Patient is an 81year old female presenting to this facility with chronic right shoulder pain. She was treated here for the same problem before and reports improved strength but then she's gotten to a point where she can't lift it up and she now has more limitation. She reports that she can't comb her hair without using the other hand for assistance. She was biking on her trike and going to a balance class and she didn't know if that was causing it. She reports this is now over. She is doing a little walking now. She reports that she still does some of the exercises she got here last time but she does not have any bands or things like that for her strength. Patient reports that if she is just sitting she has no pain in right shoulder but of she goes to lift something like a dinner plate, it goes up to a 4 or 5/10. Patient reports that she did have imaging done of the right shoulder that confirmed a tear in her rotator cuff. She is having a really hard time with her daily grooming, lifting smaller things at home, putting her dishes away and her daily chores. Patient Goals  Patient goals for therapy: decreased pain, increased motion, increased strength, independence with ADLs/IADLs and return to sport/leisure activities    Pain  Current pain ratin  At best pain ratin  At worst pain ratin  Quality: sharp  Relieving factors: medications  Aggravating factors: lifting and overhead activity  Progression: worsening    Hand dominance: right      Diagnostic Tests  X-ray: abnormal  MRI studies: abnormal  Treatments  Previous treatment: physical therapy      Objective     Static Posture     Shoulders  Asymmetric shoulders and rounded. Scapulae  Right winging. Comments  R scapular winging- can retract them but R with limited motion- pain reported in R shoulder with scap retraction     Tenderness     Additional Tenderness Details  Pt reports pain in R lateral upper arm occasionally, not reproduced with palpation     Active Range of Motion   Left Shoulder   Normal active range of motion    Right Shoulder   Flexion: 50 degrees with pain  Abduction: 72 degrees   External rotation 45°: 25 degrees   Internal rotation 45°: Right shoulder active internal rotation at 45 degrees: to table in supine.  WFL    Passive Range of Motion     Right Shoulder   Flexion: 137 degrees   Abduction: 120 degrees   External rotation 45°: 40 degrees     Additional Passive Range of Motion Details  Pt reports that she gets a sharp pain and as if something is blocking her motion with flexion. When cued to provide no assistance, these symptoms improve     Strength/Myotome Testing     Left Shoulder   Normal muscle strength    Right Shoulder     Planes of Motion   Flexion: 3-   Extension: 4+   Abduction: 3+   External rotation at 0°: 3+   Internal rotation at 0°: 4     Left Elbow   Normal strength    Right Elbow   Flexion: 4+  Extension: 4+    Functional Assessment        Comments  Pt demonstrates increased difficulty reaching a shelf that is just above shoulder height                  Precautions hx breast CA- in remission.  HEP: [id number]   hx R RTC tear        Manuals Eval 12-5-23       PROM R shld                                Neuro Re-Ed         Wall slides         Finger ladder        pulleys        Wall isometrics                                Ther Ex        MTP/LTP        Face pulls         IR/ER        Bent over rows         Table push ups         Table shoulder taps         Lateral raises         Pt education Pt provided with HEP and instructed through exercises        Ther Activity                        Gait Training                        Modalities        CP

## 2023-12-05 NOTE — PROGRESS NOTES
PT Evaluation     Today's date: 2023  Patient name: Lauren Lyles  : 1940  MRN: [id number]  Referring provider: Gonzalez Hogan MD  Dx:   Encounter Diagnosis     ICD-10-CM    1. Chronic right shoulder pain  M25.511     G89.29       2. Rotator cuff tear, non-traumatic, right  M75.101                      Assessment  Assessment details: Patient is an 81year old female presenting to this facility with complaints of pain in R shoulder that has been going on for a while. She has been treated for this same problem in the past and PT did help her. She has not been doing as much activity lately and her function of right shoulder has declined. Patient reports she does have imaging confirming chronic RTC tear. Upon evaluation, patient demonstrates significant RUE strength deficits, ROM deficits, and decreased functional mobility. Patient reports increased pain with fwd elevation of RUE. R scapular winging also observed with postural assessment. Patient would benefit from skilled physical therapy to address deficits found in todays evaluation in order to improve functional mobility, independence with ADLs, and pain/discomfort with all activities. Impairments: abnormal muscle tone, abnormal or restricted ROM, abnormal movement, impaired physical strength, lacks appropriate home exercise program, pain with function and poor body mechanics  Understanding of Dx/Px/POC: good   Prognosis: fair    Goals  ST. Decrease pain 50% 6 wk  2. Increase shoulder ROM by 10 degrees 6 wk  3. Increase shoulder strength to 4-/5 6 wk  4. Pt will report no difficulty with activity below shoulder level 6 wk    LT. Pt will report no pain 12 wk  2. Increase shoulder ROM 15 degrees 12 wk  3. Increase shoulder strength to 4/5 12 wk  4.   Pt will report no limitations with ADL's 12 wk    Plan  Patient would benefit from: PT eval and skilled physical therapy  Planned modality interventions: cryotherapy  Planned therapy interventions: flexibility, functional ROM exercises, ADL training, graded exercise, home exercise program, manual therapy, neuromuscular re-education, patient education, postural training, strengthening, stretching, self care, therapeutic activities and therapeutic exercise  Frequency: 3x week  Duration in weeks: 12  Treatment plan discussed with: patient      Subjective Evaluation    History of Present Illness  Mechanism of injury: Patient is an 81year old female presenting to this facility with chronic right shoulder pain. She was treated here for the same problem before and reports improved strength but then she's gotten to a point where she can't lift it up and she now has more limitation. She reports that she can't comb her hair without using the other hand for assistance. She was biking on her trike and going to a balance class and she didn't know if that was causing it. She reports this is now over. She is doing a little walking now. She reports that she still does some of the exercises she got here last time but she does not have any bands or things like that for her strength. Patient reports that if she is just sitting she has no pain in right shoulder but of she goes to lift something like a dinner plate, it goes up to a 4 or 5/10. Patient reports that she did have imaging done of the right shoulder that confirmed a tear in her rotator cuff. She is having a really hard time with her daily grooming, lifting smaller things at home, putting her dishes away and her daily chores.    Patient Goals  Patient goals for therapy: decreased pain, increased motion, increased strength, independence with ADLs/IADLs and return to sport/leisure activities    Pain  Current pain ratin  At best pain ratin  At worst pain ratin  Quality: sharp  Relieving factors: medications  Aggravating factors: lifting and overhead activity  Progression: worsening    Hand dominance: right      Diagnostic Tests  X-ray: abnormal  MRI studies: abnormal  Treatments  Previous treatment: physical therapy      Objective     Static Posture     Shoulders  Asymmetric shoulders and rounded. Scapulae  Right winging. Comments  R scapular winging- can retract them but R with limited motion- pain reported in R shoulder with scap retraction     Tenderness     Additional Tenderness Details  Pt reports pain in R lateral upper arm occasionally, not reproduced with palpation     Active Range of Motion   Left Shoulder   Normal active range of motion    Right Shoulder   Flexion: 50 degrees with pain  Abduction: 72 degrees   External rotation 45°: 25 degrees   Internal rotation 45°: Right shoulder active internal rotation at 45 degrees: to table in supine. WFL    Passive Range of Motion     Right Shoulder   Flexion: 137 degrees   Abduction: 120 degrees   External rotation 45°: 40 degrees     Additional Passive Range of Motion Details  Pt reports that she gets a sharp pain and as if something is blocking her motion with flexion. When cued to provide no assistance, these symptoms improve     Strength/Myotome Testing     Left Shoulder   Normal muscle strength    Right Shoulder     Planes of Motion   Flexion: 3-   Extension: 4+   Abduction: 3+   External rotation at 0°: 3+   Internal rotation at 0°: 4     Left Elbow   Normal strength    Right Elbow   Flexion: 4+  Extension: 4+    Functional Assessment        Comments  Pt demonstrates increased difficulty reaching a shelf that is just above shoulder height                  Precautions hx breast CA- in remission.  HEP: [id number]   hx R RTC tear        Manuals Eval 12-5-23       PROM R shld                                Neuro Re-Ed         Wall slides         Finger ladder        pulleys        Wall isometrics                                Ther Ex        MTP/LTP        Face pulls         IR/ER        Bent over rows         Table push ups         Table shoulder taps         Lateral raises         Pt education Pt provided with HEP and instructed through exercises        Ther Activity                        Gait Training                        Modalities        CP

## 2023-12-07 ENCOUNTER — OFFICE VISIT (OUTPATIENT)
Dept: PHYSICAL THERAPY | Facility: CLINIC | Age: 83
End: 2023-12-07
Payer: MEDICARE

## 2023-12-07 DIAGNOSIS — M75.101 ROTATOR CUFF TEAR, NON-TRAUMATIC, RIGHT: ICD-10-CM

## 2023-12-07 DIAGNOSIS — M25.511 CHRONIC RIGHT SHOULDER PAIN: Primary | ICD-10-CM

## 2023-12-07 DIAGNOSIS — G89.29 CHRONIC RIGHT SHOULDER PAIN: Primary | ICD-10-CM

## 2023-12-07 PROCEDURE — 97110 THERAPEUTIC EXERCISES: CPT

## 2023-12-07 PROCEDURE — 97112 NEUROMUSCULAR REEDUCATION: CPT

## 2023-12-07 NOTE — PROGRESS NOTES
Daily Note     Today's date: 2023  Patient name: Fabian Castillo  : 1940  MRN: [id number]  Referring provider: Su Lopez MD  Dx:   Encounter Diagnosis     ICD-10-CM    1. Chronic right shoulder pain  M25.511     G89.29       2. Rotator cuff tear, non-traumatic, right  M75.101                      Subjective: Pt reports that she is doing okay today. Objective: See treatment diary below      Assessment: Tolerated treatment well. Implemented tx plan today and tolerated well. Verbal cues required for postural awareness and scapular positioning. Pt deferred CP post session. Continue working on RUE strength and ROM. Patient would benefit from continued PT      Plan: Progress treatment as tolerated. Precautions hx breast CA- in remission. HEP: [id number]   hx R RTC tear        Manuals Eval 12      PROM R shld                                Neuro Re-Ed         Wall slides   x10      Finger ladder  x8      pulleys  x20      Wall isometrics                                Ther Ex        Nustep/UBE  UBE 5' bwd      MTP/LTP  Green 3x10 ea.       Face pulls   Green 3x10       IR/ER  R green IR 3x10  Red AAROM ER 2x10       Tricep ext   Dbl Green 2x10      Bent over rows   4# 3x10       Table push ups   2x10      Table shoulder taps   2x10      Bicep curls  4# 2x10      Lateral raises   2# 2x10 scaption       Pt education Pt provided with HEP and instructed through exercises        Ther Activity                        Gait Training                        Modalities        CP  Deferred

## 2023-12-11 ENCOUNTER — OFFICE VISIT (OUTPATIENT)
Dept: PHYSICAL THERAPY | Facility: CLINIC | Age: 83
End: 2023-12-11
Payer: MEDICARE

## 2023-12-11 DIAGNOSIS — G89.29 CHRONIC RIGHT SHOULDER PAIN: Primary | ICD-10-CM

## 2023-12-11 DIAGNOSIS — M25.511 CHRONIC RIGHT SHOULDER PAIN: Primary | ICD-10-CM

## 2023-12-11 DIAGNOSIS — M75.101 ROTATOR CUFF TEAR, NON-TRAUMATIC, RIGHT: ICD-10-CM

## 2023-12-11 PROCEDURE — 97110 THERAPEUTIC EXERCISES: CPT

## 2023-12-11 NOTE — PROGRESS NOTES
Daily Note     Today's date: 2023  Patient name: Idania Alvarez  : 1940  MRN: [id number]  Referring provider: Hannah Henriquez MD  Dx:   Encounter Diagnosis     ICD-10-CM    1. Chronic right shoulder pain  M25.511     G89.29       2. Rotator cuff tear, non-traumatic, right  M75.101                      Subjective: Pt reports that she is late due to cleaning her car off from the snow. She reports the shoulder is about the same. Objective: See treatment diary below      Assessment: Tolerated treatment well. Increased difficulty with scaption raise this visit. Patient arrived 15 min late to session again. Verbal cues for proper scap activation and positioning with exercises. Patient would benefit from continued PT      Plan: Progress treatment as tolerated. Precautions hx breast CA- in remission. HEP: [id number]   hx R RTC tear        Manuals Eval 23                                     Neuro Re-Ed         Wall slides   x10 x12     Finger ladder  x8 x10     pulleys  x20 x20     Wall isometrics   Flx, abd 5x10"                             Ther Ex        Nustep/UBE  UBE 5' bwd UBE 10' alt      MTP/LTP  Green 3x10 ea. Green 3x10 ea.      Face pulls   Green 3x10  Green 3x10      IR/ER  R green IR 3x10  Red AAROM ER 2x10  R green IR 3x10  Red AAROM ER 2x10      Tricep ext   Dbl Green 2x10 Dbl Green 3x10     Bent over rows   4# 3x10  4# 3x10      Table push ups   2x10 2x10     Table shoulder taps   2x10 2x10     Bicep curls  4# 2x10 4# 2x10     Lateral raises   2# 2x10 scaption  1# x10 scaption small range      Pt education Pt provided with HEP and instructed through exercises        Ther Activity                        Gait Training                        Modalities        CP  Deferred

## 2023-12-14 ENCOUNTER — OFFICE VISIT (OUTPATIENT)
Dept: PHYSICAL THERAPY | Facility: CLINIC | Age: 83
End: 2023-12-14
Payer: MEDICARE

## 2023-12-14 DIAGNOSIS — M75.101 ROTATOR CUFF TEAR, NON-TRAUMATIC, RIGHT: ICD-10-CM

## 2023-12-14 DIAGNOSIS — G89.29 CHRONIC RIGHT SHOULDER PAIN: Primary | ICD-10-CM

## 2023-12-14 DIAGNOSIS — M25.511 CHRONIC RIGHT SHOULDER PAIN: Primary | ICD-10-CM

## 2023-12-14 PROCEDURE — 97112 NEUROMUSCULAR REEDUCATION: CPT

## 2023-12-14 PROCEDURE — 97110 THERAPEUTIC EXERCISES: CPT

## 2023-12-14 NOTE — PROGRESS NOTES
Daily Note     Today's date: 2023  Patient name: Karen Pollard  : 1940  MRN: [id number]  Referring provider: Anamaria Evangelista MD  Dx:   Encounter Diagnosis     ICD-10-CM    1. Chronic right shoulder pain  M25.511     G89.29       2. Rotator cuff tear, non-traumatic, right  M75.101                      Subjective: Tamika Dillon reports feeling "good" today in reference to R shoulder. Objective: See treatment diary below      Assessment: Visual and VC to ensure correct exercise technique. Assist from L UE for wall slides and shoulder ER. Tactile cues to avoid UT compensation. Pt would continue to benefit from skilled PT. Plan: Continue with current POC to address pt deficits. Precautions hx breast CA- in remission. HEP: [id number]   hx R RTC tear        Manuals Eval 23                                    Neuro Re-Ed         Wall slides   x10 x12 x12    Finger ladder  x8 x10 x10    pulleys  x20 x20 x20    Wall isometrics   Flx, abd 5x10" Flx, abd 5x10" ea                            Ther Ex        Nustep/UBE  UBE 5' bwd UBE 10' alt  UBE 10' alt     MTP/LTP  Green 3x10 ea. Green 3x10 ea.  Green 3x10 ea    Face pulls   Green 3x10  Green 3x10  Green 3x10    IR/ER  R green IR 3x10  Red AAROM ER 2x10  R green IR 3x10  Red AAROM ER 2x10  R green IR 3x10  Red AAROM ER 2x10    Tricep ext   Dbl Green 2x10 Dbl Green 3x10 Dbl green 3x10    Bent over rows   4# 3x10  4# 3x10  4# 3x10    Table push ups   2x10 2x10 2x10    Table shoulder taps   2x10 2x10 2x10    Bicep curls  4# 2x10 4# 2x10 4# 2x10    Lateral raises   2# 2x10 scaption  1# x10 scaption small range  1# x20 scaption (shortened range)     Pt education Pt provided with HEP and instructed through exercises        Ther Activity                        Gait Training                        Modalities        CP  Deferred   10'

## 2023-12-18 ENCOUNTER — OFFICE VISIT (OUTPATIENT)
Dept: PHYSICAL THERAPY | Facility: CLINIC | Age: 83
End: 2023-12-18
Payer: MEDICARE

## 2023-12-18 DIAGNOSIS — M25.511 CHRONIC RIGHT SHOULDER PAIN: Primary | ICD-10-CM

## 2023-12-18 DIAGNOSIS — G89.29 CHRONIC RIGHT SHOULDER PAIN: Primary | ICD-10-CM

## 2023-12-18 DIAGNOSIS — M75.101 ROTATOR CUFF TEAR, NON-TRAUMATIC, RIGHT: ICD-10-CM

## 2023-12-18 PROCEDURE — 97112 NEUROMUSCULAR REEDUCATION: CPT

## 2023-12-18 PROCEDURE — 97110 THERAPEUTIC EXERCISES: CPT

## 2023-12-18 NOTE — PROGRESS NOTES
"Daily Note     Today's date: 2023  Patient name: Lori Byrd  : 1940  MRN: [id number]  Referring provider: Tong Claudio MD  Dx:   Encounter Diagnosis     ICD-10-CM    1. Chronic right shoulder pain  M25.511     G89.29       2. Rotator cuff tear, non-traumatic, right  M75.101                      Subjective: Lori reports most discomfort to lateral deltoid muscle today.       Objective: See treatment diary below      Assessment: Visual and VC to ensure correct exercise technique. Added tband chest press this visit as patient reports most difficulty reaching fwd. Able to perform wall slides with some reps not needing LUE assist. Progress as able.       Plan: Continue with current POC to address pt deficits      Precautions hx breast CA- in remission. HEP: [id number]   hx R RTC tear        Manuals Eval 23                                   Neuro Re-Ed         Wall slides   x10 x12 x12 x12   Finger ladder  x8 x10 x10 x10   pulleys  x20 x20 x20 x30   Wall isometrics   Flx, abd 5x10\" Flx, abd 5x10\" ea Flex, abd, 5-10\"x10 ea                            Ther Ex        Nustep/UBE  UBE 5' bwd UBE 10' alt  UBE 10' alt  UBE 8' alt, Nustep    MTP/LTP  Green 3x10 ea. Green 3x10 ea. Green 3x10 ea Grn 3x10 ea   Face pulls   Green 3x10  Green 3x10  Green 3x10 Grn 3x10   IR/ER  R green IR 3x10  Red AAROM ER 2x10  R green IR 3x10  Red AAROM ER 2x10  R green IR 3x10  Red AAROM ER 2x10 Grn IR 3x10  Grn AAROM ER 2x10   Tricep ext   Dbl Green 2x10 Dbl Green 3x10 Dbl green 3x10 Dbl grn 3x10    Tband chest press      Dbl red 2x10   Bent over rows   4# 3x10  4# 3x10  4# 3x10 4# 3x10   Table push ups   2x10 2x10 2x10 2x10   Table shoulder taps   2x10 2x10 2x10 2x10 fadumo    Bicep curls  4# 2x10 4# 2x10 4# 2x10 3# 2x10 (resume 4# NV)    Lateral raises   2# 2x10 scaption  1# x10 scaption small range  1# x20 scaption (shortened range)  1# x20 scaption (shortened range)    Pt education Pt " provided with HEP and instructed through exercises        Ther Activity                        Gait Training                        Modalities        CP  Deferred   10' 10'

## 2023-12-22 ENCOUNTER — OFFICE VISIT (OUTPATIENT)
Dept: PHYSICAL THERAPY | Facility: CLINIC | Age: 83
End: 2023-12-22
Payer: MEDICARE

## 2023-12-22 DIAGNOSIS — G89.29 CHRONIC RIGHT SHOULDER PAIN: Primary | ICD-10-CM

## 2023-12-22 DIAGNOSIS — M75.101 ROTATOR CUFF TEAR, NON-TRAUMATIC, RIGHT: ICD-10-CM

## 2023-12-22 DIAGNOSIS — M25.511 CHRONIC RIGHT SHOULDER PAIN: Primary | ICD-10-CM

## 2023-12-22 PROCEDURE — 97112 NEUROMUSCULAR REEDUCATION: CPT

## 2023-12-22 PROCEDURE — 97110 THERAPEUTIC EXERCISES: CPT

## 2023-12-22 NOTE — PROGRESS NOTES
"Daily Note     Today's date: 2023  Patient name: Lori Byrd  : 1940  MRN: [id number]  Referring provider: Tong Claudio MD  Dx:   Encounter Diagnosis     ICD-10-CM    1. Chronic right shoulder pain  M25.511     G89.29       2. Rotator cuff tear, non-traumatic, right  M75.101                      Subjective: Pt reports that she fell last night and landed on her right hand/arm. She presents with bruising over the dorsal surface of 3rd and 4th digit as well as ant wrist. She reports no tenderness or pain to this area with palpation. No apparent bruising to upper R extremity, however tighter long sleeve on and visualization limited. Palpation performed to entire UE and pt reports minimal tenderness to mid ant humerus; she claims this was present before the fall. Recommended that if she notices any additional bruising or increased pain/tenderness she goes for further examination/imaging.       Objective: See treatment diary below      Assessment: Tolerated treatment well. Slight progressions added this session to increase UE strength. Verbal and manual cues provided for press at webslide and bent over rows/horiz abd. Increased difficulty remains with overhead activity. Patient would benefit from continued PT      Plan: Progress treatment as tolerated.       Precautions hx breast CA- in remission. HEP: [id number]   hx R RTC tear        Manuals 23                                   Neuro Re-Ed         Wall slides  2x8, one in beginning, one at end  x10 x12 x12 x12   Finger ladder x10 x8 x10 x10 x10   pulleys x20 x20 x20 x20 x30   Wall isometrics   Flx, abd 5x10\" Flx, abd 5x10\" ea Flex, abd, 5-10\"x10 ea                            Ther Ex        Nustep/UBE UBE 8' alt  UBE 5' bwd UBE 10' alt  UBE 10' alt  UBE 8' alt, Nustep    MTP/LTP Grn 3x10 ea Green 3x10 ea. Green 3x10 ea. Green 3x10 ea Grn 3x10 ea   Face pulls  Grn 3x10 Green 3x10  Green 3x10  Green 3x10 Grn 3x10 "   IR/ER Grn IR 3x10  Grn AAROM ER 2x10 R green IR 3x10  Red AAROM ER 2x10  R green IR 3x10  Red AAROM ER 2x10  R green IR 3x10  Red AAROM ER 2x10 Grn IR 3x10  Grn AAROM ER 2x10   Tricep ext  Dbl grn 3x10 Dbl Green 2x10 Dbl Green 3x10 Dbl green 3x10 Dbl grn 3x10    Tband chest press  Dbl red 2x10    Dbl red 2x10   Bent over rows  5# x30 4# 3x10  4# 3x10  4# 3x10 4# 3x10   Table push ups  2x10 2x10 2x10 2x10 2x10   Table shoulder taps  2x10 fadumo  2x10 2x10 2x10 2x10 fadumo    Bicep curls 4# 2x10 4# 2x10 4# 2x10 4# 2x10 3# 2x10 (resume 4# NV)    Lateral raises  1# x20 scaption (shortened range)  2# 2x10 scaption  1# x10 scaption small range  1# x20 scaption (shortened range)  1# x20 scaption (shortened range)    Horiz abd  2x10 bent over at chair for horiz abd RUE       Pt education        Ther Activity                        Gait Training                        Modalities        CP  Deferred   10' 10'

## 2024-01-11 ENCOUNTER — APPOINTMENT (OUTPATIENT)
Dept: PHYSICAL THERAPY | Facility: CLINIC | Age: 84
End: 2024-01-11
Payer: MEDICARE

## 2024-01-20 PROBLEM — Z00.00 MEDICARE ANNUAL WELLNESS VISIT, SUBSEQUENT: Status: RESOLVED | Noted: 2022-11-15 | Resolved: 2024-01-20

## 2024-02-01 ENCOUNTER — TELEPHONE (OUTPATIENT)
Dept: FAMILY MEDICINE CLINIC | Facility: CLINIC | Age: 84
End: 2024-02-01

## 2024-02-01 DIAGNOSIS — G89.29 CHRONIC RIGHT SHOULDER PAIN: Primary | ICD-10-CM

## 2024-02-01 DIAGNOSIS — M25.511 CHRONIC RIGHT SHOULDER PAIN: Primary | ICD-10-CM

## 2024-02-01 NOTE — TELEPHONE ENCOUNTER
[facility] called and said that they need a new referral. Please enter in new PT referral, so that they can start up again.

## 2024-02-05 ENCOUNTER — EVALUATION (OUTPATIENT)
Dept: PHYSICAL THERAPY | Facility: CLINIC | Age: 84
End: 2024-02-05
Payer: MEDICARE

## 2024-02-05 DIAGNOSIS — G89.29 CHRONIC RIGHT SHOULDER PAIN: Primary | ICD-10-CM

## 2024-02-05 DIAGNOSIS — M25.511 CHRONIC RIGHT SHOULDER PAIN: Primary | ICD-10-CM

## 2024-02-05 PROCEDURE — 97535 SELF CARE MNGMENT TRAINING: CPT

## 2024-02-05 PROCEDURE — 97162 PT EVAL MOD COMPLEX 30 MIN: CPT

## 2024-02-05 NOTE — PROGRESS NOTES
PT Evaluation     Today's date: 2024  Patient name: Lori Byrd  : 1940  MRN: [id number]  Referring provider: Tong Claudio MD  Dx:   Encounter Diagnosis     ICD-10-CM    1. Chronic right shoulder pain  M25.511     G89.29                        Assessment  Assessment details: 24  Patient is an 84 year old female presenting to this facility with complaints of pain in R shoulder that has been going on for a while. Hx of R RTC tear. She has been treated for this same problem in the past and PT did help her. Upon evaluation, patient demonstrates significant RUE strength deficits, ROM deficits, and decreased functional mobility. Patient reports increased pain with fwd and lateral elevation of RUE. R scapular winging also observed with postural assessment. Patient would benefit from skilled physical therapy to address deficits found in todays evaluation in order to improve functional mobility, restore PLOF, maximize independence with ADLs, and decrease pain/discomfort with all activities.   Impairments: abnormal muscle tone, abnormal or restricted ROM, abnormal movement, activity intolerance, impaired physical strength, pain with function, poor posture  and poor body mechanics  Understanding of Dx/Px/POC: good   Prognosis: fair    Goals  ST.  Decrease pain 50% 6 wk  2.  Increase shoulder ROM by 5 degrees 6 wk  3.  Increase shoulder strength to 4-/5 6 wk  4.  Pt will report no difficulty with activity below shoulder level 6 wk    LT.  Pt will report no pain 12 wk  2.  Increase shoulder ROM 10 degrees 12 wk  3.  Increase shoulder strength to 4/5 12 wk  4.  Pt will report no limitations with ADL's 12 wk    Plan  Patient would benefit from: PT eval and skilled physical therapy  Planned modality interventions: cryotherapy  Planned therapy interventions: flexibility, functional ROM exercises, ADL training, graded exercise, home exercise program, manual therapy, neuromuscular re-education,  patient education, postural training, strengthening, stretching, self care, therapeutic activities and therapeutic exercise  Frequency: 3x week  Duration in weeks: 12  Treatment plan discussed with: patient        Subjective Evaluation    History of Present Illness  Mechanism of injury:   12-5-23 Initial PT Evaluation   Patient is an 83 year old female presenting to this facility with chronic right shoulder pain. She was treated here for the same problem before and reports improved strength but then she's gotten to a point where she can't lift it up and she now has more limitation. She reports that she can't comb her hair without using the other hand for assistance. She was biking on her trike and going to a balance class and she didn't know if that was causing it. She reports this is now over. She is doing a little walking now. She reports that she still does some of the exercises she got here last time but she does not have any bands or things like that for her strength.   Patient reports that if she is just sitting she has no pain in right shoulder but of she goes to lift something like a dinner plate, it goes up to a 4 or 5/10. Patient reports that she did have imaging done of the right shoulder that confirmed a tear in her rotator cuff.   She is having a really hard time with her daily grooming, lifting smaller things at home, putting her dishes away and her daily chores.     2-5-24 PT Initial Evaluation   Patient is an 84 year old female returning to this facility with reports of chronic right shoulder pain and hx of R RTC tear. Patient was being treated for this hear and then had to stop dur to family reasons. Pt reports that she is having a hard time lifting a coffee cup even if it is only half full. She also has a hard time lifting a plate onto the countertop. Last time she was here the pain in the right shoulder was on the outside and radiated to the elbow. Now her pain is in the front of the shoulder. She  reports that when she first wakes up her pain is worse and can at times extend down the arm. She reports combing her hair and styling it has been difficult.   She reports that she has been trying to lift weights and also do some wall exercises. She has been going to the gym almost ever day but not doing anything specifically for the shoulder. Pt goes to the pool everyday just about and she has a hard time using the right arm to swim. Her biggest challenge is rotating the arm outward from her body. If she bends forward to get something out from under the bed she has a hard time pushing back up due to the weakness in her arm. She would like to restore the strength that she had last time she was coming her consistently.   Patient Goals  Patient goals for therapy: decreased pain, increased motion, increased strength, independence with ADLs/IADLs and return to sport/leisure activities    Pain  Current pain ratin  At best pain ratin  At worst pain ratin  Quality: sharp and grinding  Relieving factors: medications  Aggravating factors: lifting and overhead activity  Progression: worsening    Hand dominance: right      Diagnostic Tests  X-ray: abnormal  MRI studies: abnormal  Treatments  Previous treatment: physical therapy        Objective     Static Posture     Shoulders  Asymmetric shoulders, depressed and rounded.    Scapulae  Right winging.    Comments  R scapular winging- can retract them but R with limited motion- pain reported in R shoulder with scap retraction     Palpation     Right   Tenderness of the anterior deltoid, infraspinatus and supraspinatus.     Tenderness     Right Shoulder  Tenderness in the acromion.     Additional Tenderness Details  Pt reports pain in R lateral upper arm occasionally, not reproduced with palpation     Active Range of Motion   Left Shoulder   Normal active range of motion    Right Shoulder   Flexion: 70 degrees with pain  Abduction: 115 degrees   External rotation 45°:  30 degrees   Internal rotation 45°: 65 degrees     Additional Active Range of Motion Details  Pt reports that it is easier for her to lift both arms at once     Passive Range of Motion     Right Shoulder   Flexion: 150 degrees   Abduction: 125 degrees   External rotation 45°: 45 degrees   Internal rotation 45°: 80 degrees     Additional Passive Range of Motion Details  Pt reports that she gets a sharp pain as if something is blocking her motion with flexion. When cued to provide no assistance, these symptoms improve     Improvement  in symptoms with manual circular oscillations to R GHJ    Strength/Myotome Testing     Left Shoulder     Planes of Motion   Flexion: 4+   Extension: 5   Abduction: 5   Adduction: 5   External rotation at 0°: 4+   Internal rotation at 0°: 5     Right Shoulder     Planes of Motion   Flexion: 3-   Extension: 5   Abduction: 3-   Adduction: 5   External rotation at 0°: 2-   Internal rotation at 0°: 4+     Left Elbow   Normal strength    Right Elbow   Flexion: 4+  Extension: 4+    Additional Strength Details  Increased upper trap compensations in R shoulder with elevation of UE     Functional Assessment        Comments  Pt demonstrates increased difficulty reaching a shelf that is just above shoulder height                  Precautions hx breast CA- in remission. HEP: [id number]   hx R RTC tear        Manuals Eval 2-5-24       PROM R shld                                Neuro Re-Ed         Wall slides         Finger ladder        pulleys        Wall isometrics                                Ther Ex        MTP/LTP        Face pulls         IR/ER        Bent over rows         Table push ups         Table shoulder taps         Lateral raises         Pt education Pt provided with education on last distributed HEP and continuing with this at home       Ther Activity                        Gait Training                        Modalities        CP

## 2024-02-08 ENCOUNTER — APPOINTMENT (OUTPATIENT)
Dept: PHYSICAL THERAPY | Facility: CLINIC | Age: 84
End: 2024-02-08
Payer: MEDICARE

## 2024-02-09 ENCOUNTER — OFFICE VISIT (OUTPATIENT)
Dept: PHYSICAL THERAPY | Facility: CLINIC | Age: 84
End: 2024-02-09
Payer: MEDICARE

## 2024-02-09 DIAGNOSIS — G89.29 CHRONIC RIGHT SHOULDER PAIN: Primary | ICD-10-CM

## 2024-02-09 DIAGNOSIS — M25.511 CHRONIC RIGHT SHOULDER PAIN: Primary | ICD-10-CM

## 2024-02-09 PROCEDURE — 97110 THERAPEUTIC EXERCISES: CPT

## 2024-02-09 NOTE — PROGRESS NOTES
"Daily Note     Today's date: 2024  Patient name: Lori Byrd  : 1940  MRN: [id number]  Referring provider: Josh Bradford MD  Dx:   Encounter Diagnosis     ICD-10-CM    1. Chronic right shoulder pain  M25.511     G89.29                      Subjective: Pt reports that her shoulder is sore with her home exercises, maybe because she has not been doing them much.       Objective: See treatment diary below      Assessment: Tolerated treatment well. Implemented full program today and tolerated well. Verbal cues required throughout the session for scap activation and proper exercise technique. Increased difficulty remains with ER R shoulder, AAROM performed. Pt reported to feel okay post session. Patient would benefit from continued PT      Plan: Progress treatment as tolerated.       Precautions hx breast CA- in remission. HEP: [id number]   hx R RTC tear        Manuals Eval 24      PROM R shld                                Neuro Re-Ed         Wall slides   X10 scap      Finger ladder  Scap x6      pulleys  X30 flx, abd       Wall isometrics  Abd, ER 5\"x10      Scap squeeze  10\"x20                      Ther Ex        MTP/LTP  Green 3x10 each       Face pulls   Green 3x10       IR/ER  Green IR 3x10   Red ER AAROM 2x10      Bicep curls   3x10       Bent over rows   4# 3x10       Tri ext   2# 2x10       Table push ups   3x10       Table shoulder taps   X20 fadumo       Lateral raises         Pt education Pt provided with education on last distributed HEP and continuing with this at home       Ther Activity                        Gait Training                        Modalities        CP  Deferred                    "

## 2024-02-12 ENCOUNTER — OFFICE VISIT (OUTPATIENT)
Dept: PHYSICAL THERAPY | Facility: CLINIC | Age: 84
End: 2024-02-12
Payer: MEDICARE

## 2024-02-12 DIAGNOSIS — G89.29 CHRONIC RIGHT SHOULDER PAIN: Primary | ICD-10-CM

## 2024-02-12 DIAGNOSIS — M25.511 CHRONIC RIGHT SHOULDER PAIN: Primary | ICD-10-CM

## 2024-02-12 PROCEDURE — 97110 THERAPEUTIC EXERCISES: CPT

## 2024-02-12 NOTE — PROGRESS NOTES
"Daily Note     Today's date: 2024  Patient name: Lori Byrd  : 1940  MRN: [id number]  Referring provider: Josh Bradford MD  Dx:   Encounter Diagnosis     ICD-10-CM    1. Chronic right shoulder pain  M25.511     G89.29                      Subjective: Pt reports that she is doing well today, offers no new complaints.       Objective: See treatment diary below      Assessment: Tolerated treatment well. Pt tolerated session well. Verbal cues provided for proper scap positioning throughout session.  Pt reported one catch in her shoulder when doing face pulls. Patient would benefit from continued PT      Plan: Progress treatment as tolerated.       Precautions hx breast CA- in remission. HEP: [id number]   hx R RTC tear        Manuals Eval 24     PROM R shld                                Neuro Re-Ed         Wall slides   X10 scap X10 scap     Finger ladder  Scap x6 Scap x12     pulleys  X30 flx, abd  X30 flx, abd      Wall isometrics  Abd, ER 5\"x10 Abd, ER 5\"x10     Scap squeeze  10\"x20 10\"x20                     Ther Ex        MTP/LTP  Green 3x10 each  Green 3x10 each      Face pulls   Green 3x10  Green 3x10      IR/ER  Green IR 3x10   Red ER AAROM 2x10 Green IR 3x10   Red ER AAROM 2x10     Bicep curls   3x10 4# 4# 3x10     Bent over rows   4# 3x10  4# 3x10      Tri ext   2# 2x10  2# 2x10      Table push ups   3x10  3x10      Table shoulder taps   X20 fadumo  X20 fadumo      Lateral raises         Tri ext    Green band Dbl 3x10      Pt education Pt provided with education on last distributed HEP and continuing with this at home       Ther Activity                        Gait Training                        Modalities        CP  Deferred                      "

## 2024-02-15 ENCOUNTER — APPOINTMENT (OUTPATIENT)
Dept: PHYSICAL THERAPY | Facility: CLINIC | Age: 84
End: 2024-02-15
Payer: MEDICARE

## 2024-02-16 ENCOUNTER — OFFICE VISIT (OUTPATIENT)
Dept: PHYSICAL THERAPY | Facility: CLINIC | Age: 84
End: 2024-02-16
Payer: MEDICARE

## 2024-02-16 DIAGNOSIS — G89.29 CHRONIC RIGHT SHOULDER PAIN: Primary | ICD-10-CM

## 2024-02-16 DIAGNOSIS — M25.511 CHRONIC RIGHT SHOULDER PAIN: Primary | ICD-10-CM

## 2024-02-16 PROCEDURE — 97110 THERAPEUTIC EXERCISES: CPT

## 2024-02-16 NOTE — PROGRESS NOTES
"Daily Note     Today's date: 2024  Patient name: Lori Byrd  : 1940  MRN: [id number]  Referring provider: Josh Bradford MD  Dx:   Encounter Diagnosis     ICD-10-CM    1. Chronic right shoulder pain  M25.511     G89.29                      Subjective: Pt reports that she is doing well today.       Objective: See treatment diary below      Assessment: Tolerated treatment well. Visual and verbal cues provided throughout the session for proper exercise technique and proper periscap activation. Modified range with face fulls to accommodate pain levels. Significant strength deficits with ER R shoulder, AAROM provided for some exercises. Patient would benefit from continued PT      Plan: Progress treatment as tolerated.       Precautions hx breast CA- in remission. HEP: [id number]   hx R RTC tear        Manuals Eval 24    PROM R shld                                Neuro Re-Ed         Wall slides   X10 scap X10 scap X10 scap    Finger ladder  Scap x6 Scap x12 X12 scap     pulleys  X30 flx, abd  X30 flx, abd  X30 flx, abd     Wall isometrics  Abd, ER 5\"x10 Abd, ER 5\"x10 Abd ER 10\"x10    Scap squeeze  10\"x20 10\"x20 10\"x20                    Ther Ex        UBE    10' alt     MTP/LTP  Green 3x10 each  Green 3x10 each  Green 3x10 each     Face pulls   Green 3x10  Green 3x10  Green 3x10 modified range     IR/ER  Green IR 3x10   Red ER AAROM 2x10 Green IR 3x10   Red ER AAROM 2x10 Green IR 3x10   Red ER AAROM 2x10    Bicep curls   3x10 4# 4# 3x10 4# 3x10    Bent over rows   4# 3x10  4# 3x10  4# 3x10     Tri ext   2# 2x10  2# 2x10  D/c    Table push ups   3x10  3x10  3x10     Table shoulder taps   X20 fadumo  X20 fadumo  X20 fadumo     Lateral raises         Tri ext    Green band Dbl 3x10  Green band Dbl 3x10     Pt education Pt provided with education on last distributed HEP and continuing with this at home       Ther Activity                        Gait Training                      "   Modalities        CP  Deferred   10' CP R shoulder

## 2024-02-19 ENCOUNTER — OFFICE VISIT (OUTPATIENT)
Dept: PHYSICAL THERAPY | Facility: CLINIC | Age: 84
End: 2024-02-19
Payer: MEDICARE

## 2024-02-19 DIAGNOSIS — M25.511 CHRONIC RIGHT SHOULDER PAIN: Primary | ICD-10-CM

## 2024-02-19 DIAGNOSIS — G89.29 CHRONIC RIGHT SHOULDER PAIN: Primary | ICD-10-CM

## 2024-02-19 PROCEDURE — 97110 THERAPEUTIC EXERCISES: CPT

## 2024-02-19 NOTE — PROGRESS NOTES
"Daily Note     Today's date: 2024  Patient name: Lori Byrd  : 1940  MRN: [id number]  Referring provider: Josh Bradford MD  Dx:   Encounter Diagnosis     ICD-10-CM    1. Chronic right shoulder pain  M25.511     G89.29                      Subjective: Pt reports that she is feeling okay today.       Objective: See treatment diary below      Assessment: Tolerated treatment well. Verbal cues provided to ensure proper mechanics with exercise. Plan for progressions in dumbbell weight NV. Patient would benefit from continued PT      Plan: Progress treatment as tolerated.       Precautions hx breast CA- in remission. HEP: [id number]   hx R RTC tear        Manuals Eval 24   PROM R shld                                Neuro Re-Ed         Wall slides   X10 scap X10 scap X10 scap X12 scap   Finger ladder  Scap x6 Scap x12 X12 scap  X12 scap    pulleys  X30 flx, abd  X30 flx, abd  X30 flx, abd  X30 flx, abd    Wall isometrics  Abd, ER 5\"x10 Abd, ER 5\"x10 Abd ER 10\"x10 Abd ER 10\"x10   Scap squeeze  10\"x20 10\"x20 10\"x20 Resume NV                   Ther Ex        UBE    10' alt  10' alt    MTP/LTP  Green 3x10 each  Green 3x10 each  Green 3x10 each  Green 3x10 each    Face pulls   Green 3x10  Green 3x10  Green 3x10 modified range  Resume NV   IR/ER  Green IR 3x10   Red ER AAROM 2x10 Green IR 3x10   Red ER AAROM 2x10 Green IR 3x10   Red ER AAROM 2x10 Green IR 3x10   Red ER AAROM 2x10   Bicep curls   3x10 4# 4# 3x10 4# 3x10 4# 3x10 progress NV   Bent over rows   4# 3x10  4# 3x10  4# 3x10  4# 3x10 Progress NV   Tri ext   2# 2x10  2# 2x10  D/c    Table push ups   3x10  3x10  3x10  3x10    Table shoulder taps   X20 fadumo  X20 fadumo  X20 fadumo  X20 fadumo    Lateral raises         Tri ext    Green band Dbl 3x10  Green band Dbl 3x10  Green band Dbl 3x10    Pt education Pt provided with education on last distributed HEP and continuing with this at home       Ther Activity                      "   Gait Training                        Modalities        CP  Deferred   10' CP R shoulder  Deferred

## 2024-02-22 ENCOUNTER — APPOINTMENT (OUTPATIENT)
Dept: PHYSICAL THERAPY | Facility: CLINIC | Age: 84
End: 2024-02-22
Payer: MEDICARE

## 2024-02-23 ENCOUNTER — OFFICE VISIT (OUTPATIENT)
Dept: PHYSICAL THERAPY | Facility: CLINIC | Age: 84
End: 2024-02-23
Payer: MEDICARE

## 2024-02-23 DIAGNOSIS — M25.511 CHRONIC RIGHT SHOULDER PAIN: Primary | ICD-10-CM

## 2024-02-23 DIAGNOSIS — G89.29 CHRONIC RIGHT SHOULDER PAIN: Primary | ICD-10-CM

## 2024-02-23 PROCEDURE — 97110 THERAPEUTIC EXERCISES: CPT

## 2024-02-23 NOTE — PROGRESS NOTES
"Daily Note     Today's date: 2024  Patient name: Lori Byrd  : 1940  MRN: [id number]  Referring provider: Josh Bradford MD  Dx:   Encounter Diagnosis     ICD-10-CM    1. Chronic right shoulder pain  M25.511     G89.29                      Subjective: Pt reports that she is learning to adapt to certain things like lifting a cup of water to fill her tea pot. She thinks the exercises are getting easier,she has been trying them at home.       Objective: See treatment diary below      Assessment: Tolerated treatment well. Verbal cues provided for proper exercise technique and scap positioning. Progressions added and tolerated well. Patient would benefit from continued PT      Plan: Progress treatment as tolerated.       Precautions hx breast CA- in remission. HEP: [id number]   hx R RTC tear        Manuals 24   PROM R shld                                Neuro Re-Ed         Wall slides  X15 scap X10 scap X10 scap X10 scap X12 scap   Finger ladder X12 scap Scap x6 Scap x12 X12 scap  X12 scap    pulleys X30 flx, abd X30 flx, abd  X30 flx, abd  X30 flx, abd  X30 flx, abd    Wall isometrics Abd, ER 10\"x10 Abd, ER 5\"x10 Abd, ER 5\"x10 Abd ER 10\"x10 Abd ER 10\"x10   Scap squeeze 12\"x20 10\"x20 10\"x20 10\"x20 Resume NV                   Ther Ex        UBE 10' alt    10' alt  10' alt    MTP/LTP Green 3x10 each  Green 3x10 each  Green 3x10 each  Green 3x10 each  Green 3x10 each    Face pulls  Green 3x10  Green 3x10  Green 3x10  Green 3x10 modified range  Resume NV   IR/ER Green IR 3x10   Red ER  Green IR 3x10   Red ER AAROM 2x10 Green IR 3x10   Red ER AAROM 2x10 Green IR 3x10   Red ER AAROM 2x10 Green IR 3x10   Red ER AAROM 2x10   Bicep curls  5# x10   2x10 4# 3x10 4# 4# 3x10 4# 3x10 4# 3x10 progress NV   Bent over rows  5# 3x10  4# 3x10  4# 3x10  4# 3x10  4# 3x10 Progress NV   Table push ups  3x10  3x10  3x10  3x10  3x10    Table shoulder taps  X20 fadumo  X20 fadumo  X20 fadumo  X20 " fadumo  X20 fadumo    Webslide T Green 3x10        Tri ext  Green 3x10   Green band Dbl 3x10  Green band Dbl 3x10  Green band Dbl 3x10    Pt education        Ther Activity                        Gait Training                        Modalities        CP 10' R shoulder  Deferred   10' CP R shoulder  Deferred

## 2024-02-26 ENCOUNTER — OFFICE VISIT (OUTPATIENT)
Dept: PHYSICAL THERAPY | Facility: CLINIC | Age: 84
End: 2024-02-26
Payer: MEDICARE

## 2024-02-26 DIAGNOSIS — G89.29 CHRONIC RIGHT SHOULDER PAIN: Primary | ICD-10-CM

## 2024-02-26 DIAGNOSIS — M25.511 CHRONIC RIGHT SHOULDER PAIN: Primary | ICD-10-CM

## 2024-02-26 PROCEDURE — 97110 THERAPEUTIC EXERCISES: CPT

## 2024-02-26 NOTE — PROGRESS NOTES
"Daily Note     Today's date: 2024  Patient name: Lori Byrd  : 1940  MRN: [id number]  Referring provider: Josh Bradford MD  Dx:   Encounter Diagnosis     ICD-10-CM    1. Chronic right shoulder pain  M25.511     G89.29                      Subjective: Pt reports she is feeling good today.       Objective: See treatment diary below      Assessment: Tolerated treatment well. Pt tolerated progressions in band color well today with no complaints of pain. Added foam roll up wall for improved overhead motion. Continue to progress pt as able. Verbal cues provided for proper scap positioning with table push ups and shoulder taps. Patient would benefit from continued PT      Plan: Progress treatment as tolerated.       Precautions hx breast CA- in remission. HEP: [id number]   hx R RTC tear        Manuals 24   PROM R shld                                Neuro Re-Ed         Wall slides  X15 scap X15 scap X10 scap X10 scap X12 scap   Finger ladder X12 scap Scap x12 Scap x12 X12 scap  X12 scap    pulleys X30 flx, abd X30 flx, abd  X30 flx, abd  X30 flx, abd  X30 flx, abd    Wall isometrics Abd, ER 10\"x10 Abd, ER 10\"x10 Abd, ER 5\"x10 Abd ER 10\"x10 Abd ER 10\"x10   Scap squeeze 12\"x20 10\"x20 10\"x20 10\"x20 Resume NV   Foam roll up wall   x8              Ther Ex        UBE 10' alt  10' alt   10' alt  10' alt    MTP/LTP Green 3x10 each  Blue 3x10 each  Green 3x10 each  Green 3x10 each  Green 3x10 each    Face pulls  Green 3x10  blue 3x10  Green 3x10  Green 3x10 modified range  Resume NV   IR/ER Green IR 3x10   Red ER  Green IR 3x10   Red ER  Green IR 3x10   Red ER AAROM 2x10 Green IR 3x10   Red ER AAROM 2x10 Green IR 3x10   Red ER AAROM 2x10   Bicep curls  5# x10   2x10 4# 5# x15  X10 4# 4# 3x10 4# 3x10 4# 3x10 progress NV   Bent over rows  5# 3x10  5# 3x10  4# 3x10  4# 3x10  4# 3x10 Progress NV   Table push ups  3x10  3x10  3x10  3x10  3x10    Table shoulder taps  X20 fadumo  X20 " fadumo  X20 fadumo  X20 fadumo  X20 fadumo    Webslide T Green 3x10  Green 3x10       Tri ext  Green 3x10  Green 3x10  Green band Dbl 3x10  Green band Dbl 3x10  Green band Dbl 3x10    Pt education        Ther Activity                        Gait Training                        Modalities        CP 10' R shoulder  deferred  10' CP R shoulder  Deferred

## 2024-02-29 ENCOUNTER — APPOINTMENT (OUTPATIENT)
Dept: PHYSICAL THERAPY | Facility: CLINIC | Age: 84
End: 2024-02-29
Payer: MEDICARE

## 2024-03-01 ENCOUNTER — OFFICE VISIT (OUTPATIENT)
Dept: PHYSICAL THERAPY | Facility: CLINIC | Age: 84
End: 2024-03-01
Payer: MEDICARE

## 2024-03-01 DIAGNOSIS — M25.511 CHRONIC RIGHT SHOULDER PAIN: Primary | ICD-10-CM

## 2024-03-01 DIAGNOSIS — G89.29 CHRONIC RIGHT SHOULDER PAIN: Primary | ICD-10-CM

## 2024-03-01 PROCEDURE — 97110 THERAPEUTIC EXERCISES: CPT

## 2024-03-01 NOTE — PROGRESS NOTES
"Daily Note     Today's date: 3/1/2024  Patient name: Lori Byrd  : 1940  MRN: [id number]  Referring provider: Josh Bradford MD  Dx:   Encounter Diagnosis     ICD-10-CM    1. Chronic right shoulder pain  M25.511     G89.29                      Subjective: Pt reports that she is doing well today.       Objective: See treatment diary below      Assessment: Tolerated treatment well. Pt tolerated exercises well with no reports of any discomfort. Some exercises not performed as pt arrived late to session. Resume NV as appropriate. Patient would benefit from continued PT      Plan: Progress treatment as tolerated.       Precautions hx breast CA- in remission. HEP: [id number]   hx R RTC tear        Manuals 2-23-24 2-26-24 3-1-24 2-16-24 2-19-24   PROM R shld                                Neuro Re-Ed         Wall slides  X15 scap X15 scap X15 scap X10 scap X12 scap   Finger ladder X12 scap Scap x12 Scap x12 X12 scap  X12 scap    pulleys X30 flx, abd X30 flx, abd  X30 flx, abd  X30 flx, abd  X30 flx, abd    Wall isometrics Abd, ER 10\"x10 Abd, ER 10\"x10 Abd, ER 10\"x10 Abd ER 10\"x10 Abd ER 10\"x10   Scap squeeze 12\"x20 10\"x20 10\"x20 10\"x20 Resume NV   Foam roll up wall   x8 x10             Ther Ex        UBE 10' alt  10' alt  10' alt  10' alt  10' alt    MTP/LTP Green 3x10 each  Blue 3x10 each  Blue 3x10 each  Green 3x10 each  Green 3x10 each    Face pulls  Green 3x10  blue 3x10  blue 3x10  Green 3x10 modified range  Resume NV   IR/ER Green IR 3x10   Red ER  Green IR 3x10   Red ER  Resume IR NV Green IR 3x10   Red ER AAROM 2x10 Green IR 3x10   Red ER AAROM 2x10   Bicep curls  5# x10   2x10 4# 5# x15  X10 4# 5# x15  X10 4# 4# 3x10 4# 3x10 progress NV   Bent over rows  5# 3x10  5# 3x10  5# 3x10  4# 3x10  4# 3x10 Progress NV   Table push ups  3x10  3x10  3x10  3x10  3x10    Table shoulder taps  X20 fadumo  X20 fadumo  X20 fadumo  X20 fadumo  X20 fadumo    Webslide T Green 3x10  Green 3x10       Tri ext  Green 3x10  Green 3x10  " Blue 3x10  Green band Dbl 3x10  Green band Dbl 3x10    Pt education        Ther Activity                        Gait Training                        Modalities        CP 10' R shoulder  deferred 10' CP R shoulder  10' CP R shoulder  Deferred

## 2024-03-05 ENCOUNTER — OFFICE VISIT (OUTPATIENT)
Dept: PHYSICAL THERAPY | Facility: CLINIC | Age: 84
End: 2024-03-05
Payer: MEDICARE

## 2024-03-05 DIAGNOSIS — M25.511 CHRONIC RIGHT SHOULDER PAIN: Primary | ICD-10-CM

## 2024-03-05 DIAGNOSIS — G89.29 CHRONIC RIGHT SHOULDER PAIN: Primary | ICD-10-CM

## 2024-03-05 PROCEDURE — 97110 THERAPEUTIC EXERCISES: CPT

## 2024-03-05 NOTE — PROGRESS NOTES
"Daily Note     Today's date: 3/5/2024  Patient name: Lori Byrd  : 1940  MRN: [id number]  Referring provider: Josh Bradford MD  Dx:   Encounter Diagnosis     ICD-10-CM    1. Chronic right shoulder pain  M25.511     G89.29                      Subjective: Patient is unsure if it is the rainy weather but her right shoulder is a little achy today. She also carried packages at her side yesterday and that could have done it.       Objective: See treatment diary below      Assessment: Tolerated treatment well. Visual and verbal cues required for table push ups to lower hips and shift weight fwd over the shoulders. Pt reported to feel good post session.  Patient would benefit from continued PT      Plan: Progress treatment as tolerated.       Precautions hx breast CA- in remission. HEP: [id number]   hx R RTC tear        Manuals 2-23-24 2-26-24 3-1-24 3-5-24 2-19-24   PROM R shld                                Neuro Re-Ed         Wall slides  X15 scap X15 scap X15 scap 2x10 scap X12 scap   Finger ladder X12 scap Scap x12 Scap x12 X12 scap  X12 scap    pulleys X30 flx, abd X30 flx, abd  X30 flx, abd  X30 flx, abd  X30 flx, abd    Wall isometrics Abd, ER 10\"x10 Abd, ER 10\"x10 Abd, ER 10\"x10 Abd ER 10\"x10 Abd ER 10\"x10   Scap squeeze 12\"x20 10\"x20 10\"x20 10\"x20 Resume NV   Foam roll up wall   x8 x10 x10    Horiz abd     Bent over horiz abd x20     Ther Ex        UBE 10' alt  10' alt  10' alt  10' alt  10' alt    MTP/LTP Green 3x10 each  Blue 3x10 each  Blue 3x10 each  Blue 3x10 each  Green 3x10 each    Face pulls  Green 3x10  blue 3x10  blue 3x10  Blue 3x10  Resume NV   IR/ER Green IR 3x10   Red ER  Green IR 3x10   Red ER  Green IR 3x10   Red ER  Green IR 3x10   Red ER AAROM 2x10 Green IR 3x10   Red ER AAROM 2x10   Bicep curls  5# x10   2x10 4# 5# x15  X10 4# 5# x15  X10 4# 4# 3x10 4# 3x10 progress NV   Bent over rows  5# 3x10  5# 3x10  5# 3x10  4# 3x10  4# 3x10 Progress NV   Table push ups  3x10  3x10  3x10  " 3x10  3x10    Table shoulder taps  X20 fadumo  X20 fadumo  X20 fadumo  X20 fadumo  X20 fadumo    Webslide T Green 3x10  Green 3x10  Green 3x10      Tri ext  Green 3x10  Green 3x10  Blue 3x10  Green band Dbl 3x10  Green band Dbl 3x10    Pt education        Ther Activity                        Gait Training                        Modalities        CP 10' R shoulder  deferred 10' CP R shoulder  10' CP R shoulder  Deferred

## 2024-03-07 NOTE — PROGRESS NOTES
"Daily Note     Today's date: 3/8/2024  Patient name: Lori Byrd  : 1940  MRN: [id number]  Referring provider: Josh Bradford MD  Dx:   Encounter Diagnosis     ICD-10-CM    1. Chronic right shoulder pain  M25.511     G89.29                      Subjective:   I'm doing ok today      Objective: See treatment diary below      Assessment: Tolerated treatment well.  Added manual therapy per pt request.  Continued limited strength with ER.  Patient would benefit from continued PT      Plan: Continue per plan of care.      Precautions hx breast CA- in remission. HEP: [id number]   hx R RTC tear        Manuals 2-23-24 2-26-24 3-1-24 3-5-24 3-8-24   PROM R shld     8'                           Neuro Re-Ed         Wall slides  X15 scap X15 scap X15 scap 2x10 scap 2x10 scap   Finger ladder X12 scap Scap x12 Scap x12 X12 scap  X12 scap    pulleys X30 flx, abd X30 flx, abd  X30 flx, abd  X30 flx, abd  X30 flx, abd    Wall isometrics Abd, ER 10\"x10 Abd, ER 10\"x10 Abd, ER 10\"x10 Abd ER 10\"x10 Abd ER 10\"x10   Scap squeeze 12\"x20 10\"x20 10\"x20 10\"x20 20x  5-10\"   Foam roll up wall   x8 x10 x10 10x   Horiz abd     Bent over horiz abd x20     Ther Ex        UBE 10' alt  10' alt  10' alt  10' alt  10' alt    MTP/LTP Green 3x10 each  Blue 3x10 each  Blue 3x10 each  Blue 3x10 each  Blue 3x10 each    Face pulls  Green 3x10  blue 3x10  blue 3x10  Blue 3x10  Blue 3x10   IR/ER Green IR 3x10   Red ER  Green IR 3x10   Red ER  Green IR 3x10   Red ER  Green IR 3x10   Red ER AAROM 2x10 Green IR 3x10   Red ER AAROM 3x10   Bicep curls  5# x10   2x10 4# 5# x15  X10 4# 5# x15  X10 4# 4# 3x10 5# 3x10    Bent over rows  5# 3x10  5# 3x10  5# 3x10  4# 3x10  5# 3x10   Table push ups  3x10  3x10  3x10  3x10  3x10    Table shoulder taps  X20 fadumo  X20 fadumo  X20 fadumo  X20 fadumo  X20 fadumo    Webslide T Green 3x10  Green 3x10  Green 3x10      Tri ext  Green 3x10  Green 3x10  Blue 3x10  Green band Dbl 3x10     Pt education        Ther Activity           "              Gait Training                        Modalities        CP 10' R shoulder  deferred 10' CP R shoulder  10' CP R shoulder  Deferred

## 2024-03-08 ENCOUNTER — OFFICE VISIT (OUTPATIENT)
Dept: PHYSICAL THERAPY | Facility: CLINIC | Age: 84
End: 2024-03-08
Payer: MEDICARE

## 2024-03-08 DIAGNOSIS — M25.511 CHRONIC RIGHT SHOULDER PAIN: Primary | ICD-10-CM

## 2024-03-08 DIAGNOSIS — G89.29 CHRONIC RIGHT SHOULDER PAIN: Primary | ICD-10-CM

## 2024-03-08 PROCEDURE — 97110 THERAPEUTIC EXERCISES: CPT

## 2024-03-11 ENCOUNTER — OFFICE VISIT (OUTPATIENT)
Dept: PHYSICAL THERAPY | Facility: CLINIC | Age: 84
End: 2024-03-11
Payer: MEDICARE

## 2024-03-11 DIAGNOSIS — M25.511 CHRONIC RIGHT SHOULDER PAIN: Primary | ICD-10-CM

## 2024-03-11 DIAGNOSIS — G89.29 CHRONIC RIGHT SHOULDER PAIN: Primary | ICD-10-CM

## 2024-03-11 PROCEDURE — 97112 NEUROMUSCULAR REEDUCATION: CPT

## 2024-03-11 PROCEDURE — 97110 THERAPEUTIC EXERCISES: CPT

## 2024-03-11 NOTE — PROGRESS NOTES
"Daily Note     Today's date: 3/11/2024  Patient name: Lori Byrd  : 1940  MRN: [id number]  Referring provider: Josh Bradford MD  Dx:   Encounter Diagnosis     ICD-10-CM    1. Chronic right shoulder pain  M25.511     G89.29                      Subjective: Pt reports that she is feeling pretty good today.       Objective: See treatment diary below      Assessment: Tolerated treatment well. Pt tolerated tx well with slight discomfort in shoulder with elevation above 90. Verbal cues provided for proper scap positioning. Patient would benefit from continued PT      Plan: Progress treatment as tolerated.       Precautions hx breast CA- in remission. HEP: [id number]   hx R RTC tear        Manuals 3-11-24 2-26-24 3-1-24 3-5-24 3-8-24   PROM R shld     8'                           Neuro Re-Ed         Wall slides  2x10 scap X15 scap X15 scap 2x10 scap 2x10 scap   Finger ladder Scap x12  Scap x12 Scap x12 X12 scap  X12 scap    pulleys X30 flx, abd  X30 flx, abd  X30 flx, abd  X30 flx, abd  X30 flx, abd    Wall isometrics Abd ER 10\"x10 Abd, ER 10\"x10 Abd, ER 10\"x10 Abd ER 10\"x10 Abd ER 10\"x10   Scap squeeze 10\"x20  10\"x20 10\"x20 10\"x20 20x  5-10\"   Foam roll up wall  2x10  x8 x10 x10 10x   Horiz abd  Bent over horiz abd x20    Bent over horiz abd x20     Ther Ex        UBE 10' alt  10' alt  10' alt  10' alt  10' alt    MTP/LTP Blue 3x10 each  Blue 3x10 each  Blue 3x10 each  Blue 3x10 each  Blue 3x10 each    Face pulls  Blue x30  blue 3x10  blue 3x10  Blue 3x10  Blue 3x10   IR/ER Green IR 3x10   Red ER AAROM 3x10 Green IR 3x10   Red ER  Green IR 3x10   Red ER  Green IR 3x10   Red ER AAROM 2x10 Green IR 3x10   Red ER AAROM 3x10   Bicep curls  5# x10   2x10 4# 5# x15  X10 4# 5# x15  X10 4# 4# 3x10 5# 3x10    Bent over rows  5# 3x10 5# 3x10  5# 3x10  4# 3x10  5# 3x10   Table push ups  3x10  3x10  3x10  3x10  3x10    Table shoulder taps  X20 fadumo  X20 fadumo  X20 fadumo  X20 fadumo  X20 fadumo    Webslide T  Green 3x10  Green " 3x10      Tri ext  Green x30  Green 3x10  Blue 3x10  Green band Dbl 3x10     Pt education        Ther Activity                        Gait Training                        Modalities        CP Deferred  deferred 10' CP R shoulder  10' CP R shoulder  Deferred

## 2024-03-15 ENCOUNTER — EVALUATION (OUTPATIENT)
Dept: PHYSICAL THERAPY | Facility: CLINIC | Age: 84
End: 2024-03-15
Payer: MEDICARE

## 2024-03-15 DIAGNOSIS — G89.29 CHRONIC RIGHT SHOULDER PAIN: Primary | ICD-10-CM

## 2024-03-15 DIAGNOSIS — M25.511 CHRONIC RIGHT SHOULDER PAIN: Primary | ICD-10-CM

## 2024-03-15 PROCEDURE — 97110 THERAPEUTIC EXERCISES: CPT

## 2024-03-15 NOTE — PROGRESS NOTES
PT Re-Evaluation     Today's date: 3/15/2024  Patient name: Lori Byrd  : 1940  MRN: [id number]  Referring provider: Josh Bradford MD  Dx:   Encounter Diagnosis     ICD-10-CM    1. Chronic right shoulder pain  M25.511     G89.29                        Assessment  Assessment details: 24 Initial Evaluation   Patient is an 84 year old female presenting to this facility with complaints of pain in R shoulder that has been going on for a while. Hx of R RTC tear. She has been treated for this same problem in the past and PT did help her. Upon evaluation, patient demonstrates significant RUE strength deficits, ROM deficits, and decreased functional mobility. Patient reports increased pain with fwd and lateral elevation of RUE. R scapular winging also observed with postural assessment. Patient would benefit from skilled physical therapy to address deficits found in todays evaluation in order to improve functional mobility, restore PLOF, maximize independence with ADLs, and decrease pain/discomfort with all activities.     3-15-24 PT Re-Evaluation   Patient is an 84 year old female presenting to this facility with complaints of pain in R shoulder that has been going on for a while. Hx of R RTC tear. She has been treated for this same problem in the past and PT did help her. Pt reports that starting back at PT has helped her feel a little stronger, she thinks it helps to strengthen the shoulder. Upon re-evaluation, Patient continues to demonstrate decreased ROM and strength, particularly in periscap musculature and ER. Patient made slight improvements in ROM and strength. She continues to have difficulty with at home activities involving lifting with RUE. Her pain has been less frequent. She has been adapting to do more things with her LUE. Patient would continue to benefit from skilled physical therapy in order to address remaining deficits and improve functional mobility while decreasing pain levels.        Impairments: abnormal muscle tone, abnormal or restricted ROM, abnormal movement, activity intolerance, impaired physical strength, pain with function, poor posture  and poor body mechanics  Understanding of Dx/Px/POC: good   Prognosis: fair    Goals  ST.  Decrease pain 50% 6 wk  2.  Increase shoulder ROM by 5 degrees 6 wk  3.  Increase shoulder strength to 4-/5 6 wk  4.  Pt will report no difficulty with activity below shoulder level 6 wk  (75% met)  LT.  Pt will report no pain 12 wk  2.  Increase shoulder ROM 10 degrees 12 wk  3.  Increase shoulder strength to 4/5 12 wk  4.  Pt will report no limitations with ADL's 12 wk (45% met)    Plan  Plan details: Continue with physical therapy   Patient would benefit from: PT eval and skilled physical therapy  Planned modality interventions: cryotherapy  Planned therapy interventions: flexibility, functional ROM exercises, ADL training, graded exercise, home exercise program, manual therapy, neuromuscular re-education, patient education, postural training, strengthening, stretching, self care, therapeutic activities and therapeutic exercise  Frequency: 3x week  Duration in weeks: 12  Treatment plan discussed with: patient        Subjective Evaluation    History of Present Illness  Mechanism of injury:   23 Initial PT Evaluation   Patient is an 83 year old female presenting to this facility with chronic right shoulder pain. She was treated here for the same problem before and reports improved strength but then she's gotten to a point where she can't lift it up and she now has more limitation. She reports that she can't comb her hair without using the other hand for assistance. She was biking on her trike and going to a balance class and she didn't know if that was causing it. She reports this is now over. She is doing a little walking now. She reports that she still does some of the exercises she got here last time but she does not have any bands or  things like that for her strength.   Patient reports that if she is just sitting she has no pain in right shoulder but of she goes to lift something like a dinner plate, it goes up to a 4 or 5/10. Patient reports that she did have imaging done of the right shoulder that confirmed a tear in her rotator cuff.   She is having a really hard time with her daily grooming, lifting smaller things at home, putting her dishes away and her daily chores.     24 PT Initial Evaluation   Patient is an 84 year old female returning to this facility with reports of chronic right shoulder pain and hx of R RTC tear. Patient was being treated for this hear and then had to stop dur to family reasons. Pt reports that she is having a hard time lifting a coffee cup even if it is only half full. She also has a hard time lifting a plate onto the countertop. Last time she was here the pain in the right shoulder was on the outside and radiated to the elbow. Now her pain is in the front of the shoulder. She reports that when she first wakes up her pain is worse and can at times extend down the arm. She reports combing her hair and styling it has been difficult.   She reports that she has been trying to lift weights and also do some wall exercises. She has been going to the gym almost ever day but not doing anything specifically for the shoulder. Pt goes to the pool everyday just about and she has a hard time using the right arm to swim. Her biggest challenge is rotating the arm outward from her body. If she bends forward to get something out from under the bed she has a hard time pushing back up due to the weakness in her arm. She would like to restore the strength that she had last time she was coming her consistently.   Patient Goals  Patient goals for therapy: decreased pain, increased motion, increased strength, independence with ADLs/IADLs and return to sport/leisure activities    Pain  Current pain ratin  At best pain ratin  At  worst pain ratin  Quality: sharp and grinding  Relieving factors: medications and ice  Aggravating factors: lifting and overhead activity  Progression: worsening    Hand dominance: right      Diagnostic Tests  X-ray: abnormal  MRI studies: abnormal  Treatments  Previous treatment: physical therapy        Objective     Static Posture     Shoulders  Asymmetric shoulders, depressed and rounded.    Scapulae  Right winging.    Comments  R scapular winging- can retract them but R with limited motion    Postural Observations  Seated posture: fair  Standing posture: fair      Palpation     Right   Tenderness of the infraspinatus and supraspinatus.     Tenderness     Additional Tenderness Details  Pt reports pain in R lateral upper arm occasionally, not reproduced with palpation     Active Range of Motion   Left Shoulder   Normal active range of motion    Right Shoulder   Flexion: 115 degrees with pain  Abduction: 84 degrees   External rotation 45°: 35 degrees   Internal rotation 45°: 75 degrees     Additional Active Range of Motion Details  Pt reports that it is easier for her to lift both arms at once     Passive Range of Motion     Right Shoulder   Flexion: 150 degrees   Abduction: 130 degrees   External rotation 45°: 45 degrees   Internal rotation 45°: 80 degrees     Additional Passive Range of Motion Details  Pt reports that she gets a sharp pain as if something is blocking her motion with flexion on occasion. When cued to provide no assistance, these symptoms improve     Improvement  in symptoms with manual circular oscillations to R GHJ    Strength/Myotome Testing     Left Shoulder     Planes of Motion   Flexion: 4+   Extension: 5   Abduction: 5   Adduction: 5   External rotation at 0°: 4+   Internal rotation at 0°: 5     Right Shoulder     Planes of Motion   Flexion: 3+   Extension: 5   Abduction: 3+   Adduction: 5   External rotation at 0°: 3-   Internal rotation at 0°: 4+     Left Elbow   Normal  "strength    Right Elbow   Flexion: 4+  Extension: 4+    Additional Strength Details  Increased upper trap compensations in R shoulder with elevation of UE     Functional Assessment        Comments  Pt demonstrates increased difficulty reaching a shelf that is just above shoulder height                Precautions hx breast CA- in remission. HEP: [id number]   hx R RTC tear        Manuals 3-11-24 3-14-24 Re-Eval 3-1-24 3-5-24 3-8-24   PROM R shld     8'                           Neuro Re-Ed         Wall slides  2x10 scap 2x10 scap X15 scap 2x10 scap 2x10 scap   Finger ladder Scap x12  Scap x12 Scap x12 X12 scap  X12 scap    pulleys X30 flx, abd  X30 flx, abd  X30 flx, abd  X30 flx, abd  X30 flx, abd    Wall isometrics Abd ER 10\"x10  Abd, ER 10\"x10 Abd ER 10\"x10 Abd ER 10\"x10   Scap squeeze 10\"x20  10\"x20 10\"x20 10\"x20 20x  5-10\"   Foam roll up wall  2x10  2x10 x10 x10 10x   Horiz abd  Bent over horiz abd x20  Bent over horiz abd x20   Bent over horiz abd x20     Ther Ex        UBE 10' alt  10' alt  10' alt  10' alt  10' alt    MTP/LTP Blue 3x10 each  Blue 3x10 each  Blue 3x10 each  Blue 3x10 each  Blue 3x10 each    Face pulls  Blue x30  blue 3x10  blue 3x10  Blue 3x10  Blue 3x10   IR/ER Green IR 3x10   Red ER AAROM 3x10 Green IR 3x10   Red ER AAROM 3x10 Green IR 3x10   Red ER  Green IR 3x10   Red ER AAROM 2x10 Green IR 3x10   Red ER AAROM 3x10   Bicep curls  5# x10   2x10 4# 5# 3x10 5# x15  X10 4# 4# 3x10 5# 3x10    Bent over rows  5# 3x10 5# 3x10  5# 3x10  4# 3x10  5# 3x10   Table push ups  3x10  3x10  3x10  3x10  3x10    Table shoulder taps  X20 fadumo  X20 fadumo  X20 fadumo  X20 fadumo  X20 fadumo    Webslide T   Green 3x10      Tri ext  Green x30  Blue  3x10  Blue 3x10  Green band Dbl 3x10     Pt education        Ther Activity                        Gait Training                        Modalities        CP Deferred  10'CP 10' CP R shoulder  10' CP R shoulder  Deferred                  "

## 2024-03-18 ENCOUNTER — OFFICE VISIT (OUTPATIENT)
Dept: PHYSICAL THERAPY | Facility: CLINIC | Age: 84
End: 2024-03-18
Payer: MEDICARE

## 2024-03-18 DIAGNOSIS — M25.511 CHRONIC RIGHT SHOULDER PAIN: Primary | ICD-10-CM

## 2024-03-18 DIAGNOSIS — G89.29 CHRONIC RIGHT SHOULDER PAIN: Primary | ICD-10-CM

## 2024-03-18 PROCEDURE — 97112 NEUROMUSCULAR REEDUCATION: CPT

## 2024-03-18 PROCEDURE — 97110 THERAPEUTIC EXERCISES: CPT

## 2024-03-18 NOTE — PROGRESS NOTES
"Daily Note     Today's date: 3/18/2024  Patient name: Lori Byrd  : 1940  MRN: [id number]  Referring provider: Josh Bradford MD  Dx:   Encounter Diagnosis     ICD-10-CM    1. Chronic right shoulder pain  M25.511     G89.29                      Subjective: Pt reports that her shoulder was bothering her a little yesterday but today it is pretty good. She feels that it is getting a little stronger.       Objective: See treatment diary below      Assessment: Tolerated treatment well. Minimal verbal cues provided throughout session to ensure proper scap positioning and body mechanics. Pt reported some discomfort lateral upper arm toward the end of the session.  Patient would benefit from continued PT      Plan: Progress treatment as tolerated.       Precautions hx breast CA- in remission. HEP: [id number]   hx R RTC tear        Manuals 3-11-24 3-14-24 Re-Eval 3-18-24 3-5-24 3-8-24   PROM R shld     8'                           Neuro Re-Ed         Wall slides  2x10 scap 2x10 scap 2x10 scap 2x10 scap 2x10 scap   Finger ladder Scap x12  Scap x12 Scap x12 X12 scap  X12 scap    pulleys X30 flx, abd  X30 flx, abd  X30 flx, abd  X30 flx, abd  X30 flx, abd    Wall isometrics Abd ER 10\"x10  Abd, ER 10\"x10 Abd ER 10\"x10 Abd ER 10\"x10   Scap squeeze 10\"x20  10\"x20 10\"x20 10\"x20 20x  5-10\"   Foam roll up wall  2x10  2x10 2x10  x10 10x   Horiz abd  Bent over horiz abd x20  Bent over horiz abd x20  Bent over horiz abd x20  Bent over horiz abd x20     Ther Ex        UBE 10' alt  10' alt  10' alt  10' alt  10' alt    MTP/LTP Blue 3x10 each  Blue 3x10 each  Blue 3x10 each  Blue 3x10 each  Blue 3x10 each    Face pulls  Blue x30  blue 3x10  blue 3x10  Blue 3x10  Blue 3x10   IR/ER Green IR 3x10   Red ER AAROM 3x10 Green IR 3x10   Red ER AAROM 3x10 Green IR 3x10   Red ER  Green IR 3x10   Red ER AAROM 2x10 Green IR 3x10   Red ER AAROM 3x10   Bicep curls  5# x10   2x10 4# 5# 3x10 5# 3x10  4# 3x10 5# 3x10    Bent over rows  5# " 3x10 5# 3x10  5# 3x10  4# 3x10  5# 3x10   Table push ups  3x10  3x10  3x10  3x10  3x10    Table shoulder taps  X20 fadumo  X20 fadumo  X20 fadumo  X20 fadumo  X20 fadumo    Webslide T   Green 3x10      Tri ext  Green x30  Blue  3x10  Blue 3x10  Green band Dbl 3x10     Pt education        Ther Activity                        Gait Training                        Modalities        CP Deferred  10'CP 10' CP R shoulder  10' CP R shoulder  Deferred

## 2024-03-22 ENCOUNTER — OFFICE VISIT (OUTPATIENT)
Dept: PHYSICAL THERAPY | Facility: CLINIC | Age: 84
End: 2024-03-22
Payer: MEDICARE

## 2024-03-22 DIAGNOSIS — G89.29 CHRONIC RIGHT SHOULDER PAIN: Primary | ICD-10-CM

## 2024-03-22 DIAGNOSIS — M25.511 CHRONIC RIGHT SHOULDER PAIN: Primary | ICD-10-CM

## 2024-03-22 PROCEDURE — 97010 HOT OR COLD PACKS THERAPY: CPT

## 2024-03-22 PROCEDURE — 97110 THERAPEUTIC EXERCISES: CPT

## 2024-03-22 NOTE — PROGRESS NOTES
"Daily Note     Today's date: 3/22/2024  Patient name: Lori Byrd  : 1940  MRN: [id number]  Referring provider: Josh Bradford MD  Dx:   Encounter Diagnosis     ICD-10-CM    1. Chronic right shoulder pain  M25.511     G89.29                      Subjective: Pt reports that the right shoulder is feeling good today.       Objective: See treatment diary below      Assessment: Tolerated treatment well. Verbal cues provided throughout session to ensure proper exercise technique and to engage scap musculature. Difficulty remains with wall slides at lower ranges. Patient would benefit from continued PT      Plan: Progress treatment as tolerated.       Precautions hx breast CA- in remission. HEP: [id number]   hx R RTC tear        Manuals 3-11-24 3-14-24 Re-Eval 3-18-24 3-22-24 3-8-24   PROM R shld     8'                           Neuro Re-Ed         Wall slides  2x10 scap 2x10 scap 2x10 scap 2x10 scap 2x10 scap   Finger ladder Scap x12  Scap x12 Scap x12 Scap x12 X12 scap    pulleys X30 flx, abd  X30 flx, abd  X30 flx, abd  Scap x12 X30 flx, abd    Wall isometrics Abd ER 10\"x10  Abd, ER 10\"x10  Abd ER 10\"x10   Scap squeeze 10\"x20  10\"x20 10\"x20 10\"x20 20x  5-10\"   Foam roll up wall  2x10  2x10 2x10  2x10  10x   Horiz abd  Bent over horiz abd x20  Bent over horiz abd x20  Bent over horiz abd x20  Bent over horiz abd x20     Ther Ex        UBE 10' alt  10' alt  10' alt  10' alt  10' alt    MTP/LTP Blue 3x10 each  Blue 3x10 each  Blue 3x10 each  Blue 3x10 each  Blue 3x10 each    Face pulls  Blue x30  blue 3x10  blue 3x10  blue 3x10  Blue 3x10   IR/ER Green IR 3x10   Red ER AAROM 3x10 Green IR 3x10   Red ER AAROM 3x10 Green IR 3x10   Red ER  Green IR 3x10   Red ER  with towel under arm  Green IR 3x10   Red ER AAROM 3x10   Bicep curls  5# x10   2x10 4# 5# 3x10 5# 3x10  5# 3x10  5# 3x10    Bent over rows  5# 3x10 5# 3x10  5# 3x10  5# 3x10  5# 3x10   Table push ups  3x10  3x10  3x10  3x10  3x10    Table shoulder " taps  X20 fadumo  X20 fadumo  X20 fadumo  X20 fadumo  X20 fadumo    Webslide T   Green 3x10  Green 3x10     Tri ext  Green x30  Blue  3x10  Blue 3x10  Blue 3x10     Pt education        Ther Activity                        Gait Training                        Modalities        CP Deferred  10'CP 10' CP R shoulder  10' CP R shoulder  Deferred

## 2024-03-25 ENCOUNTER — OFFICE VISIT (OUTPATIENT)
Dept: PHYSICAL THERAPY | Facility: CLINIC | Age: 84
End: 2024-03-25
Payer: MEDICARE

## 2024-03-25 DIAGNOSIS — M25.511 CHRONIC RIGHT SHOULDER PAIN: Primary | ICD-10-CM

## 2024-03-25 DIAGNOSIS — G89.29 CHRONIC RIGHT SHOULDER PAIN: Primary | ICD-10-CM

## 2024-03-25 PROCEDURE — 97110 THERAPEUTIC EXERCISES: CPT

## 2024-03-25 NOTE — PROGRESS NOTES
"Daily Note     Today's date: 3/25/2024  Patient name: Lori Byrd  : 1940  MRN: [id number]  Referring provider: Josh Bradford MD  Dx:   Encounter Diagnosis     ICD-10-CM    1. Chronic right shoulder pain  M25.511     G89.29                      Subjective: Pt reports that her shoulder is feeling pretty good today.       Objective: See treatment diary below      Assessment: Tolerated treatment well. Verbal cues provided for table exercises to improve mechanics and have hands under CHIARA. Pt reported some discomfort with finger ladder that went away after exercises. Continue to progress pt as able. Patient would benefit from continued PT      Plan: Progress treatment as tolerated.       Precautions hx breast CA- in remission. HEP: [id number]   hx R RTC tear        Manuals 3-11-24 3-14-24 Re-Eval 3-18-24 3-22-24 3-25-24   PROM R shld                                Neuro Re-Ed         Wall slides  2x10 scap 2x10 scap 2x10 scap 2x10 scap 2x10 scap   Finger ladder Scap x12  Scap x12 Scap x12 Scap x12 X12 scap    pulleys X30 flx, abd  X30 flx, abd  X30 flx, abd  Scap x12 X30 flx, abd    Wall isometrics Abd ER 10\"x10  Abd, ER 10\"x10  Abd ER 10\"x10   Scap squeeze 10\"x20  10\"x20 10\"x20 10\"x20 20x  5-10\"   Foam roll up wall  2x10  2x10 2x10  2x10  2x10   Horiz abd  Bent over horiz abd x20  Bent over horiz abd x20  Bent over horiz abd x20  Bent over horiz abd x20  Bent over horiz abd x20    Ther Ex        UBE 10' alt  10' alt  10' alt  10' alt  10' alt    MTP/LTP Blue 3x10 each  Blue 3x10 each  Blue 3x10 each  Blue 3x10 each  Blue 3x10 each    Face pulls  Blue x30  blue 3x10  blue 3x10  blue 3x10  Blue 3x10   IR/ER Green IR 3x10   Red ER AAROM 3x10 Green IR 3x10   Red ER AAROM 3x10 Green IR 3x10   Red ER  Green IR 3x10   Red ER  with towel under arm  3x10 Green IR  Red ER with towel under arm    Bicep curls  5# x10   2x10 4# 5# 3x10 5# 3x10  5# 3x10  5# 3x10    Bent over rows  5# 3x10 5# 3x10  5# 3x10  5# 3x10  5# " 3x10   Table push ups  3x10  3x10  3x10  3x10  3x10    Table shoulder taps  X20 fadumo  X20 fadumo  X20 fadumo  X20 fadumo  X20 fadumo    Webslide T   Green 3x10  Green 3x10  Green 3x10    Tri ext  Green x30  Blue  3x10  Blue 3x10  Blue 3x10  Blue 3x10    Pt education        Ther Activity                        Gait Training                        Modalities        CP Deferred  10'CP 10' CP R shoulder  10' CP R shoulder  Deferred

## 2024-03-28 ENCOUNTER — OFFICE VISIT (OUTPATIENT)
Dept: PHYSICAL THERAPY | Facility: CLINIC | Age: 84
End: 2024-03-28
Payer: MEDICARE

## 2024-03-28 DIAGNOSIS — G89.29 CHRONIC RIGHT SHOULDER PAIN: Primary | ICD-10-CM

## 2024-03-28 DIAGNOSIS — M25.511 CHRONIC RIGHT SHOULDER PAIN: Primary | ICD-10-CM

## 2024-03-28 PROCEDURE — 97110 THERAPEUTIC EXERCISES: CPT

## 2024-03-28 PROCEDURE — 97112 NEUROMUSCULAR REEDUCATION: CPT

## 2024-03-28 NOTE — PROGRESS NOTES
"Daily Note     Today's date: 3/28/2024  Patient name: Lori Byrd  : 1940  MRN: [id number]  Referring provider: Josh Bradford MD  Dx:   Encounter Diagnosis     ICD-10-CM    1. Chronic right shoulder pain  M25.511     G89.29                      Subjective: Lori reports pain to anterior aspect of R shoulder but feels it is always going to be there. She feels she may be ready to perform these exercises at home starting next week.       Objective: See treatment diary below      Assessment: Able to increase resistance for tband horizontal abduction indicating an improvement in strength; good technique noted. Visual and VC to ensure correct exercise technique and ensure appropriate positioning of scapula. Progress as able.       Plan: Continue with current POC to address pt deficits.      Precautions hx breast CA- in remission. HEP: [id number]   hx R RTC tear        Manuals 3-28-24 3-14-24 Re-Eval 3-18-24 3-22-24 3-25-24   PROM R shld                                Neuro Re-Ed         Wall slides  2x10 scap  2x10 scap 2x10 scap 2x10 scap 2x10 scap   Finger ladder X12 scap  Scap x12 Scap x12 Scap x12 X12 scap    pulleys X30 flex, abd  X30 flx, abd  X30 flx, abd  Scap x12 X30 flx, abd    Wall isometrics Abd, ER 10\"x10  Abd, ER 10\"x10  Abd ER 10\"x10   Scap squeeze 10\"x20 10\"x20 10\"x20 10\"x20 20x  5-10\"   Foam roll up wall  2x10 2x10 2x10  2x10  2x10   Horiz abd  Bent over horiz abd x20 Bent over horiz abd x20  Bent over horiz abd x20  Bent over horiz abd x20  Bent over horiz abd x20    Ther Ex        UBE 10' 10' alt  10' alt  10' alt  10' alt    MTP/LTP Blue 3x10 ea Blue 3x10 each  Blue 3x10 each  Blue 3x10 each  Blue 3x10 each    Face pulls  Blue 3x10 blue 3x10  blue 3x10  blue 3x10  Blue 3x10   IR/ER 3x10 Green IR  Red ER with towel under arm Green IR 3x10   Red ER AAROM 3x10 Green IR 3x10   Red ER  Green IR 3x10   Red ER  with towel under arm  3x10 Green IR  Red ER with towel under arm    Bicep curls  " 5# 3x10 5# 3x10 5# 3x10  5# 3x10  5# 3x10            Bent over rows  5# 3x10 5# 3x10  5# 3x10  5# 3x10  5# 3x10   Table push ups  3x10 3x10  3x10  3x10  3x10    Table shoulder taps  2x10 X20 fadumo  X20 fadumo  X20 fadumo  X20 fadumo    Webslide T Blue 3x10  Green 3x10  Green 3x10  Green 3x10    Tri ext  Blue 3x10 Blue  3x10  Blue 3x10  Blue 3x10  Blue 3x10    Pt education        Ther Activity                        Gait Training                        Modalities        CP 10' CP  10'CP 10' CP R shoulder  10' CP R shoulder  Deferred

## 2024-04-01 ENCOUNTER — OFFICE VISIT (OUTPATIENT)
Dept: PHYSICAL THERAPY | Facility: CLINIC | Age: 84
End: 2024-04-01
Payer: MEDICARE

## 2024-04-01 DIAGNOSIS — M25.511 CHRONIC RIGHT SHOULDER PAIN: Primary | ICD-10-CM

## 2024-04-01 DIAGNOSIS — G89.29 CHRONIC RIGHT SHOULDER PAIN: Primary | ICD-10-CM

## 2024-04-01 PROCEDURE — 97110 THERAPEUTIC EXERCISES: CPT

## 2024-04-01 NOTE — PROGRESS NOTES
"Daily Note     Today's date: 2024  Patient name: Lori Byrd  : 1940  MRN: [id number]  Referring provider: Josh Bradford MD  Dx:   Encounter Diagnosis     ICD-10-CM    1. Chronic right shoulder pain  M25.511     G89.29                      Subjective: Pt reports that her weekend was good, her shoulder is about the same.       Objective: See treatment diary below      Assessment: Tolerated treatment well. Pt reported pain/catching with face pulls, verbal cues provided for proper technique and mechanics and pt reported improvement in symptoms and no pain. Verbal cues also provided to increase periscap muscular activation with horiz abd. Pt reported to feel good post session. Plan for transition to HEP and home management this week. Patient would benefit from continued PT      Plan: Progress treatment as tolerated.       Precautions hx breast CA- in remission. HEP: [id number]   hx R RTC tear        Manuals 3-28-24 4-1-24 3-18-24 3-22-24 3-25-24   PROM R shld                                Neuro Re-Ed         Wall slides  2x10 scap  2x10 scap  2x10 scap 2x10 scap 2x10 scap   Finger ladder X12 scap  x12 Scap x12 Scap x12 X12 scap    pulleys X30 flex, abd  X30 flex, abd  X30 flx, abd  Scap x12 X30 flx, abd    Wall isometrics Abd, ER 10\"x10 Abd, ER 10\"x10 Abd, ER 10\"x10  Abd ER 10\"x10   Scap squeeze 10\"x20 10\"x20 10\"x20 10\"x20 20x  5-10\"   Foam roll up wall  2x10 2x10  2x10  2x10  2x10   Horiz abd  Bent over horiz abd x20 Bent over horiz abd x20  Add weight NV as able  Bent over horiz abd x20  Bent over horiz abd x20  Bent over horiz abd x20    Ther Ex        UBE 10' 10' alt  10' alt  10' alt  10' alt    MTP/LTP Blue 3x10 ea Blue 3x10 each  Blue 3x10 each  Blue 3x10 each  Blue 3x10 each    Face pulls  Blue 3x10 Blue 3x10 blue 3x10  blue 3x10  Blue 3x10   IR/ER 3x10 Green IR  Red ER with towel under arm 3x10 Green IR  Red ER with towel under arm Green IR 3x10   Red ER  Green IR 3x10   Red ER  with towel " under arm  3x10 Green IR  Red ER with towel under arm    Bicep curls  5# 3x10 5# 3x10 5# 3x10  5# 3x10  5# 3x10            Bent over rows  5# 3x10 5# 3x10 5# 3x10  5# 3x10  5# 3x10   Table push ups  3x10 3x10  3x10  3x10  3x10    Table shoulder taps  2x10 X20  X20 fadumo  X20 fadumo  X20 fadumo    Webslide T Blue 3x10 Green 3x10  Green 3x10  Green 3x10  Green 3x10    Tri ext  Blue 3x10 Blue 3x10  Blue 3x10  Blue 3x10  Blue 3x10    Pt education        Ther Activity                        Gait Training                        Modalities        CP 10' CP  10' CP  10' CP R shoulder  10' CP R shoulder  Deferred

## 2024-04-03 ENCOUNTER — TELEPHONE (OUTPATIENT)
Dept: FAMILY MEDICINE CLINIC | Facility: CLINIC | Age: 84
End: 2024-04-03

## 2024-04-03 DIAGNOSIS — R30.0 DYSURIA: Primary | ICD-10-CM

## 2024-04-03 RX ORDER — SULFAMETHOXAZOLE AND TRIMETHOPRIM 800; 160 MG/1; MG/1
1 TABLET ORAL EVERY 12 HOURS SCHEDULED
Qty: 14 TABLET | Refills: 0 | Status: SHIPPED | OUTPATIENT
Start: 2024-04-03 | End: 2024-04-10

## 2024-04-03 NOTE — TELEPHONE ENCOUNTER
Patient is experiencing uncomfortable pressure and frequency with urination. Asked for you to prescribe medication.

## 2024-04-04 ENCOUNTER — OFFICE VISIT (OUTPATIENT)
Dept: PHYSICAL THERAPY | Facility: CLINIC | Age: 84
End: 2024-04-04
Payer: MEDICARE

## 2024-04-04 ENCOUNTER — APPOINTMENT (OUTPATIENT)
Dept: LAB | Facility: CLINIC | Age: 84
End: 2024-04-04
Payer: MEDICARE

## 2024-04-04 DIAGNOSIS — G89.29 CHRONIC RIGHT SHOULDER PAIN: Primary | ICD-10-CM

## 2024-04-04 DIAGNOSIS — R30.0 DYSURIA: ICD-10-CM

## 2024-04-04 DIAGNOSIS — M25.511 CHRONIC RIGHT SHOULDER PAIN: Primary | ICD-10-CM

## 2024-04-04 PROCEDURE — 97112 NEUROMUSCULAR REEDUCATION: CPT

## 2024-04-04 PROCEDURE — 87077 CULTURE AEROBIC IDENTIFY: CPT

## 2024-04-04 PROCEDURE — 87086 URINE CULTURE/COLONY COUNT: CPT

## 2024-04-04 PROCEDURE — 87186 SC STD MICRODIL/AGAR DIL: CPT

## 2024-04-04 PROCEDURE — 97110 THERAPEUTIC EXERCISES: CPT

## 2024-04-04 NOTE — PROGRESS NOTES
"Daily Note     Today's date: 2024  Patient name: Lori Byrd  : 1940  MRN: [id number]  Referring provider: Josh Bradford MD  Dx:   Encounter Diagnosis     ICD-10-CM    1. Chronic right shoulder pain  M25.511     G89.29                      Subjective: Pt reports that her shoulder is feeling pretty good, about the same. She is ready to start working on the program at home, she just needs to remember the banded exercises.       Objective: See treatment diary below      Assessment: Tolerated treatment well. Pt tolerated all exercises well with minimal verbal cues for completion. Patient was provided with additions for her HEP including all banded exercises. Each exercise was demonstrated and explained. Pt reported to feel good post session. Plan for d/c after today.        Plan: Plan for discharge     Precautions hx breast CA- in remission. HEP: [id number] , [id number]        hx R RTC tear        Manuals 3-28-24 4-1-24 4-4-24 3-22-24 3-25-24   PROM R shld                                Neuro Re-Ed         Wall slides  2x10 scap  2x10 scap  2x10 scap 2x10 scap 2x10 scap   Finger ladder X12 scap  x12 Scap x12 Scap x12 X12 scap    pulleys X30 flex, abd  X30 flex, abd  X30 flx, abd  Scap x12 X30 flx, abd    Wall isometrics Abd, ER 10\"x10 Abd, ER 10\"x10 Abd, ER 10\"x10  Abd ER 10\"x10   Scap squeeze 10\"x20 10\"x20 10\"x20 10\"x20 20x  5-10\"   Foam roll up wall  2x10 2x10  2x10  2x10  2x10   Horiz abd  Bent over horiz abd x20 Bent over horiz abd x20  Add weight NV as able  Bent over horiz abd x20 with 1# Bent over horiz abd x20  Bent over horiz abd x20    Ther Ex        UBE 10' 10' alt  10' alt  10' alt  10' alt    MTP/LTP Blue 3x10 ea Blue 3x10 each  Blue 3x10 each  Blue 3x10 each  Blue 3x10 each    Face pulls  Blue 3x10 Blue 3x10 blue 3x10  blue 3x10  Blue 3x10   IR/ER 3x10 Green IR  Red ER with towel under arm 3x10 Green IR  Red ER with towel under arm Green IR 3x10   Red ER  Green IR 3x10   Red ER  with " towel under arm  3x10 Green IR  Red ER with towel under arm    Bicep curls  5# 3x10 5# 3x10 5# 3x10  5# 3x10  5# 3x10            Bent over rows  5# 3x10 5# 3x10 5# 3x10  5# 3x10  5# 3x10   Table push ups  3x10 3x10  3x10  3x10  3x10    Table shoulder taps  2x10 X20  X20 fadumo  X20 fadumo  X20 fadumo    Webslide T Blue 3x10 Green 3x10  Blue 3x10  Green 3x10  Green 3x10    Tri ext  Blue 3x10 Blue 3x10  Blue 3x10  Blue 3x10  Blue 3x10    Pt education   Pt provided with additional HEP for banded work [id number]     Ther Activity                        Gait Training                        Modalities        CP 10' CP  10' CP  10' CP R shoulder  10' CP R shoulder  Deferred

## 2024-04-04 NOTE — PROGRESS NOTES
PT Discharge     Today's date: 2024  Patient name: Lori Byrd  : 1940  MRN: [id number]  Referring provider: Josh Bradford MD  Dx:   Encounter Diagnosis     ICD-10-CM    1. Chronic right shoulder pain  M25.511     G89.29                        Assessment  Assessment details: 24 Initial Evaluation   Patient is an 84 year old female presenting to this facility with complaints of pain in R shoulder that has been going on for a while. Hx of R RTC tear. She has been treated for this same problem in the past and PT did help her. Upon evaluation, patient demonstrates significant RUE strength deficits, ROM deficits, and decreased functional mobility. Patient reports increased pain with fwd and lateral elevation of RUE. R scapular winging also observed with postural assessment. Patient would benefit from skilled physical therapy to address deficits found in todays evaluation in order to improve functional mobility, restore PLOF, maximize independence with ADLs, and decrease pain/discomfort with all activities.     3-15-24 PT Re-Evaluation   Patient is an 84 year old female presenting to this facility with complaints of pain in R shoulder that has been going on for a while. Hx of R RTC tear. She has been treated for this same problem in the past and PT did help her. Pt reports that starting back at PT has helped her feel a little stronger, she thinks it helps to strengthen the shoulder. Upon re-evaluation, Patient continues to demonstrate decreased ROM and strength, particularly in periscap musculature and ER. Patient made slight improvements in ROM and strength. She continues to have difficulty with at home activities involving lifting with RUE. Her pain has been less frequent. She has been adapting to do more things with her LUE. Patient would continue to benefit from skilled physical therapy in order to address remaining deficits and improve functional mobility while decreasing pain levels.      24 PT Discharge   Patient is an 84 year old female presenting to this facility with complaints of pain in R shoulder that has been going on for a while. Hx of R RTC tear. She has been treated for this same problem in the past and PT did help her. Patient has consistently attended PT and reported continued improvements in strength and functional mobility. She has become independent with most exercises at PT and is ready to transition to full independence with updated HEP and gym program. At this time, patient is discharged from physical therapy.     Impairments: abnormal muscle tone, abnormal or restricted ROM, abnormal movement, activity intolerance, impaired physical strength, pain with function, poor posture  and poor body mechanics  Understanding of Dx/Px/POC: good   Prognosis: fair    Goals  ST.  Decrease pain 50% 6 wk  2.  Increase shoulder ROM by 5 degrees 6 wk  3.  Increase shoulder strength to 4-/5 6 wk  4.  Pt will report no difficulty with activity below shoulder level 6 wk  (Mostly met)  LT.  Pt will report no pain 12 wk  2.  Increase shoulder ROM 10 degrees 12 wk  3.  Increase shoulder strength to 4/5 12 wk  4.  Pt will report no limitations with ADL's 12 wk (Partially met)     Plan  Plan details: Discharge from physical therapy   Planned modality interventions: cryotherapy  Planned therapy interventions: flexibility, functional ROM exercises, ADL training, graded exercise, home exercise program, manual therapy, neuromuscular re-education, patient education, postural training, strengthening, stretching, self care, therapeutic activities and therapeutic exercise  Treatment plan discussed with: patient        Subjective Evaluation    History of Present Illness  Mechanism of injury:   23 Initial PT Evaluation   Patient is an 83 year old female presenting to this facility with chronic right shoulder pain. She was treated here for the same problem before and reports improved strength  but then she's gotten to a point where she can't lift it up and she now has more limitation. She reports that she can't comb her hair without using the other hand for assistance. She was biking on her trike and going to a balance class and she didn't know if that was causing it. She reports this is now over. She is doing a little walking now. She reports that she still does some of the exercises she got here last time but she does not have any bands or things like that for her strength.   Patient reports that if she is just sitting she has no pain in right shoulder but of she goes to lift something like a dinner plate, it goes up to a 4 or 5/10. Patient reports that she did have imaging done of the right shoulder that confirmed a tear in her rotator cuff.   She is having a really hard time with her daily grooming, lifting smaller things at home, putting her dishes away and her daily chores.     2-5-24 PT Initial Evaluation   Patient is an 84 year old female returning to this facility with reports of chronic right shoulder pain and hx of R RTC tear. Patient was being treated for this hear and then had to stop dur to family reasons. Pt reports that she is having a hard time lifting a coffee cup even if it is only half full. She also has a hard time lifting a plate onto the countertop. Last time she was here the pain in the right shoulder was on the outside and radiated to the elbow. Now her pain is in the front of the shoulder. She reports that when she first wakes up her pain is worse and can at times extend down the arm. She reports combing her hair and styling it has been difficult.   She reports that she has been trying to lift weights and also do some wall exercises. She has been going to the gym almost ever day but not doing anything specifically for the shoulder. Pt goes to the pool everyday just about and she has a hard time using the right arm to swim. Her biggest challenge is rotating the arm outward from  her body. If she bends forward to get something out from under the bed she has a hard time pushing back up due to the weakness in her arm. She would like to restore the strength that she had last time she was coming her consistently.   Patient Goals  Patient goals for therapy: decreased pain, increased motion, increased strength, independence with ADLs/IADLs and return to sport/leisure activities    Pain  Current pain ratin  At best pain ratin  At worst pain ratin  Quality: sharp and grinding  Relieving factors: medications and ice  Aggravating factors: lifting and overhead activity  Progression: worsening    Hand dominance: right      Diagnostic Tests  X-ray: abnormal  MRI studies: abnormal  Treatments  Previous treatment: physical therapy        Objective     Static Posture     Shoulders  Asymmetric shoulders, depressed and rounded.    Scapulae  Right winging.    Comments  R scapular winging- can retract them but R with limited motion    Postural Observations  Seated posture: fair  Standing posture: fair      Palpation     Right   Tenderness of the infraspinatus and supraspinatus.     Tenderness     Additional Tenderness Details  Pt reports pain in R lateral upper arm occasionally, not reproduced with palpation     Active Range of Motion   Left Shoulder   Normal active range of motion    Right Shoulder   Flexion: 115 degrees with pain  Abduction: 84 degrees   External rotation 45°: 35 degrees   Internal rotation 45°: 75 degrees     Additional Active Range of Motion Details  Pt reports that it is easier for her to lift both arms at once     Passive Range of Motion     Right Shoulder   Flexion: 150 degrees   Abduction: 130 degrees   External rotation 45°: 45 degrees   Internal rotation 45°: 80 degrees     Additional Passive Range of Motion Details  Pt reports that she gets a sharp pain as if something is blocking her motion with flexion on occasion. When cued to provide no assistance, these symptoms  improve     Improvement  in symptoms with manual circular oscillations to R GHJ    Strength/Myotome Testing     Left Shoulder     Planes of Motion   Flexion: 4+   Extension: 5   Abduction: 5   Adduction: 5   External rotation at 0°: 4+   Internal rotation at 0°: 5     Right Shoulder     Planes of Motion   Flexion: 3+   Extension: 5   Abduction: 3+   Adduction: 5   External rotation at 0°: 3-   Internal rotation at 0°: 4+     Left Elbow   Normal strength    Right Elbow   Flexion: 4+  Extension: 4+    Additional Strength Details  Increased upper trap compensations in R shoulder with elevation of UE     Functional Assessment        Comments  Pt demonstrates increased difficulty reaching a shelf that is just above shoulder height

## 2024-04-05 DIAGNOSIS — I10 ESSENTIAL HYPERTENSION: ICD-10-CM

## 2024-04-05 RX ORDER — RAMIPRIL 10 MG/1
10 CAPSULE ORAL EVERY MORNING
Qty: 90 CAPSULE | Refills: 3 | Status: SHIPPED | OUTPATIENT
Start: 2024-04-05

## 2024-04-06 LAB — BACTERIA UR CULT: ABNORMAL

## 2024-04-21 DIAGNOSIS — E78.5 HYPERLIPIDEMIA, UNSPECIFIED HYPERLIPIDEMIA TYPE: ICD-10-CM

## 2024-04-22 RX ORDER — ATORVASTATIN CALCIUM 20 MG/1
TABLET, FILM COATED ORAL
Qty: 90 TABLET | Refills: 3 | Status: SHIPPED | OUTPATIENT
Start: 2024-04-22

## 2024-04-24 ENCOUNTER — APPOINTMENT (OUTPATIENT)
Dept: RADIOLOGY | Facility: CLINIC | Age: 84
End: 2024-04-24
Payer: MEDICARE

## 2024-04-24 ENCOUNTER — OFFICE VISIT (OUTPATIENT)
Dept: OBGYN CLINIC | Facility: CLINIC | Age: 84
End: 2024-04-24
Payer: MEDICARE

## 2024-04-24 VITALS
HEIGHT: 64 IN | BODY MASS INDEX: 29.5 KG/M2 | SYSTOLIC BLOOD PRESSURE: 149 MMHG | HEART RATE: 60 BPM | WEIGHT: 172.8 LBS | DIASTOLIC BLOOD PRESSURE: 81 MMHG

## 2024-04-24 DIAGNOSIS — M54.50 ACUTE LEFT-SIDED LOW BACK PAIN WITHOUT SCIATICA: Primary | ICD-10-CM

## 2024-04-24 DIAGNOSIS — M41.50 DEGENERATIVE SCOLIOSIS: ICD-10-CM

## 2024-04-24 DIAGNOSIS — M51.36 DDD (DEGENERATIVE DISC DISEASE), LUMBAR: ICD-10-CM

## 2024-04-24 PROCEDURE — 99213 OFFICE O/P EST LOW 20 MIN: CPT | Performed by: ORTHOPAEDIC SURGERY

## 2024-04-24 PROCEDURE — 72110 X-RAY EXAM L-2 SPINE 4/>VWS: CPT

## 2024-04-24 RX ORDER — OFLOXACIN 3 MG/ML
SOLUTION/ DROPS OPHTHALMIC
COMMUNITY
Start: 2024-02-07

## 2024-04-24 NOTE — PROGRESS NOTES
[facility] ORTHOPEDIC SPINE SURGERY  DR.AMIR CHRISTOS MD  [address] [facility] [address] [phone]    HISTORY OF PRESENT ILLNESS:    Lori Byrd is a 84 y.o. female who presents for initial evaluation of lumbar spine.  Patient reports she has pain in the left side of low back that goes down into lateral aspect of hip and buttocks. Pain has been present for a few months without known injury. Pain worsens with sitting and standing for long periods of time. Patient denies any prior injections, physical therapy, or surgery for her low back.       ALLERGIES:   Allergies   Allergen Reactions    Brimonidine Other (See Comments)     Eye lashes fell off       MEDICATIONS:    Current Outpatient Medications:     anastrozole (ARIMIDEX) 1 mg tablet, Take 1 mg by mouth daily, Disp: , Rfl:     aspirin (ECOTRIN LOW STRENGTH) 81 mg EC tablet, Take 81 mg by mouth, Disp: , Rfl:     atorvastatin (LIPITOR) 20 mg tablet, take 1 tablet by mouth every morning, Disp: 90 tablet, Rfl: 3    Calcium-Phosphorus-Vitamin D (Citracal +D3) 250-107-500 MG-MG-UNIT CHEW, Chew, Disp: , Rfl:     dorzolamide-timolol (COSOPT) 22.3-6.8 MG/ML ophthalmic solution, 1 drop 8 am and 8 pm both eyes, Disp: , Rfl:     hydrochlorothiazide (HYDRODIURIL) 12.5 mg tablet, take 1 tablet by mouth every morning, Disp: 90 tablet, Rfl: 3    Multiple Vitamin (multivitamin) capsule, Take 1 capsule by mouth daily, Disp: , Rfl:     ofloxacin (OCUFLOX) 0.3 % ophthalmic solution, , Disp: , Rfl:     ramipril (ALTACE) 10 MG capsule, take 1 capsule by mouth every morning, Disp: 90 capsule, Rfl: 3     PAST MEDICAL HISTORY:   Past Medical History:   Diagnosis Date    Breast cancer (HCC) 2021    treated with XRT and lumpectomy right sided    Hyperlipidemia, unspecified     Hypertension     Rotator cuff injury     Right       PAST SURGICAL HISTORY:  Past Surgical History:   Procedure Laterality Date    BREAST LUMPECTOMY Right 2021    CATARACT EXTRACTION Bilateral      HYSTERECTOMY         SOCIAL HISTORY:  Social History     Tobacco Use   Smoking Status Never   Smokeless Tobacco Never          PHYSICAL EXAM:  84 y.o. female sitting comfortably on exam chair in no apparent distress.   Ambulates leaning forward with slight antalgic gait  Sagittal imbalance noted, patient was leaning forward.  There was also evidence of coronal imbalance with her leaning to the left.  Able to go up on toes and heels  Able to balance on one leg  No TTP over thoracic or lumbar spine  5/5 motor strength in bilateral lower extremities with normal sensation  Absent deep tendon reflexes bilateral lower extremities   No significant pain with range of motion of left hip or knee.       RADIOGRAPHIC STUDIES:  Xrays, lumbar spine, 4/24/2024: Severe multilevel degenerative changes and facet arthrosis. No evidence of spondylolisthesis. Degenerative curvature of lumbar spine noted.  Mild idiopathic scoliosis with progression due to degenerative changes.      ASSESSMENT:  1. Acute left-sided low back pain without sciatica  -     XR spine lumbar minimum 4 views non injury  -     Ambulatory Referral to Physical Therapy; Future    2. DDD (degenerative disc disease), lumbar  -     Ambulatory Referral to Physical Therapy; Future    3. Degenerative scoliosis  -     Ambulatory Referral to Physical Therapy; Future        PLAN:  84 y.o. female with low back pain.  She does not have any signs of radiculopathy.  On physical examination there is indication of both coronal sagittal deformity consistent with radiographic studies were multilevel degenerative changes are present.  The patient most likely suffering from spinal stenosis.    Treatment options were discussed.  She needs to be treated conservatively.  Role of physical therapy more specifically pool therapy was discussed.  Her  has gone to pool therapy and they do have access to a swimming pool where they live.  I did suggest that she try a short course of aqua  therapy and if helpful to continue doing the exercises on her own pool.    She has not had an MRI as of now.  We are going to try the conservative measures.  If the physical therapy fails, MRI study lumbar spine will be necessary.  At this time the symptoms are not severe enough to justify pain management options.    Follow-up in 2 months following aqua therapy.             Scribe Attestation      I,:  Sandra Levin PA-C am acting as a scribe while in the presence of the attending physician.:       I,:  Angelique Orourke MD personally performed the services described in this documentation    as scribed in my presence.:

## 2024-04-29 DIAGNOSIS — I10 ESSENTIAL HYPERTENSION: ICD-10-CM

## 2024-04-30 RX ORDER — HYDROCHLOROTHIAZIDE 12.5 MG/1
TABLET ORAL
Qty: 90 TABLET | Refills: 1 | Status: SHIPPED | OUTPATIENT
Start: 2024-04-30

## 2024-05-23 ENCOUNTER — TELEPHONE (OUTPATIENT)
Age: 84
End: 2024-05-23

## 2024-05-23 DIAGNOSIS — R60.0 LOCALIZED EDEMA: ICD-10-CM

## 2024-05-23 DIAGNOSIS — M79.89 LEFT LEG SWELLING: Primary | ICD-10-CM

## 2024-05-23 NOTE — TELEPHONE ENCOUNTER
Patient called she had her appt with [facility] today. She stated the physical therapist would like her to get evaluated for a possible blood clot on her lower left leg.     The patient is asking for a script to have her leg evaluated and to be contacted once script is in her chart.     Please advise, Thank you.

## 2024-05-24 ENCOUNTER — HOSPITAL ENCOUNTER (OUTPATIENT)
Dept: NON INVASIVE DIAGNOSTICS | Facility: CLINIC | Age: 84
Discharge: HOME | End: 2024-05-24
Payer: MEDICARE

## 2024-05-24 DIAGNOSIS — M79.89 LEFT LEG SWELLING: ICD-10-CM

## 2024-05-24 DIAGNOSIS — R60.0 LOCALIZED EDEMA: ICD-10-CM

## 2024-05-24 PROCEDURE — 93971 EXTREMITY STUDY: CPT | Performed by: SURGERY

## 2024-05-24 PROCEDURE — 93971 EXTREMITY STUDY: CPT

## 2024-05-29 ENCOUNTER — TELEPHONE (OUTPATIENT)
Dept: FAMILY MEDICINE CLINIC | Facility: CLINIC | Age: 84
End: 2024-05-29

## 2024-05-29 DIAGNOSIS — M71.22 BAKER'S CYST OF KNEE, LEFT: Primary | ICD-10-CM

## 2024-05-29 NOTE — TELEPHONE ENCOUNTER
[email or web address]      ----- Message from Tong Claudio MD sent at 5/29/2024  3:55 PM EDT -----  So these cysts can be drained or injected I placed a referral for her to see Dr. Walter sports medicine.

## 2024-05-30 ENCOUNTER — TELEPHONE (OUTPATIENT)
Age: 84
End: 2024-05-30

## 2024-05-30 NOTE — TELEPHONE ENCOUNTER
Pt called back and given recommendations.  Said the pain is subsiding and she is going to hold off on the referral for a day or two.  Said she will call back if the swelling does not go down.

## 2024-06-03 ENCOUNTER — OFFICE VISIT (OUTPATIENT)
Dept: OBGYN CLINIC | Facility: CLINIC | Age: 84
End: 2024-06-03
Payer: MEDICARE

## 2024-06-03 VITALS
BODY MASS INDEX: 28.75 KG/M2 | HEIGHT: 64 IN | WEIGHT: 168.4 LBS | HEART RATE: 60 BPM | SYSTOLIC BLOOD PRESSURE: 147 MMHG | DIASTOLIC BLOOD PRESSURE: 81 MMHG

## 2024-06-03 DIAGNOSIS — M51.36 DDD (DEGENERATIVE DISC DISEASE), LUMBAR: ICD-10-CM

## 2024-06-03 DIAGNOSIS — M41.50 DEGENERATIVE SCOLIOSIS: ICD-10-CM

## 2024-06-03 DIAGNOSIS — M54.50 ACUTE LEFT-SIDED LOW BACK PAIN WITHOUT SCIATICA: Primary | ICD-10-CM

## 2024-06-03 PROCEDURE — 99213 OFFICE O/P EST LOW 20 MIN: CPT | Performed by: ORTHOPAEDIC SURGERY

## 2024-06-03 NOTE — PROGRESS NOTES
[facility] ORTHOPEDIC SPINE SURGERY  DR.AMIR CHRISTOS MD  [address] [facility] [address] [phone]    HISTORY OF PRESENT ILLNESS:    Lori Byrd is a 84 y.o. female who presents for follow-up of lumbar spine. Patient was last seen in the office on 4/24/2024 where patient was referred to aqua therapy. Patient reports that she attended one session of aqua therapy at the [facility] location. Patient states the left leg and left sided low back pain has resolved, but she is having pain in the middle of mid to low back that has been present for about a year. Patient is planning on switching to the [facility] location.       ALLERGIES:   Allergies   Allergen Reactions    Brimonidine Other (See Comments)     Eye lashes fell off       MEDICATIONS:    Current Outpatient Medications:     anastrozole (ARIMIDEX) 1 mg tablet, Take 1 mg by mouth daily, Disp: , Rfl:     aspirin (ECOTRIN LOW STRENGTH) 81 mg EC tablet, Take 81 mg by mouth, Disp: , Rfl:     atorvastatin (LIPITOR) 20 mg tablet, take 1 tablet by mouth every morning, Disp: 90 tablet, Rfl: 3    Calcium-Phosphorus-Vitamin D (Citracal +D3) 250-107-500 MG-MG-UNIT CHEW, Chew, Disp: , Rfl:     dorzolamide-timolol (COSOPT) 22.3-6.8 MG/ML ophthalmic solution, 1 drop 8 am and 8 pm both eyes, Disp: , Rfl:     hydroCHLOROthiazide 12.5 mg tablet, take 1 tablet by mouth every morning, Disp: 90 tablet, Rfl: 1    Multiple Vitamin (multivitamin) capsule, Take 1 capsule by mouth daily, Disp: , Rfl:     ofloxacin (OCUFLOX) 0.3 % ophthalmic solution, , Disp: , Rfl:     ramipril (ALTACE) 10 MG capsule, take 1 capsule by mouth every morning, Disp: 90 capsule, Rfl: 3     PAST MEDICAL HISTORY:   Past Medical History:   Diagnosis Date    Breast cancer (HCC) 2021    treated with XRT and lumpectomy right sided    Hyperlipidemia, unspecified     Hypertension     Rotator cuff injury     Right       PAST SURGICAL HISTORY:  Past Surgical History:   Procedure Laterality Date     BREAST LUMPECTOMY Right 2021    CATARACT EXTRACTION Bilateral     HYSTERECTOMY         SOCIAL HISTORY:  Social History     Tobacco Use   Smoking Status Never   Smokeless Tobacco Never          PHYSICAL EXAM:  84 y.o. female sitting comfortably on exam chair in no apparent distress.   Ambulates leaning forward with slight antalgic gait  Sagittal imbalance noted, patient was leaning forward.  There was also evidence of coronal imbalance with her leaning to the left.  Able to go up on toes and heels  Able to balance on one leg  No TTP over thoracic or lumbar spine  5/5 motor strength in bilateral lower extremities with normal sensation      RADIOGRAPHIC STUDIES:  Xrays, lumbar spine, 4/24/2024: Severe multilevel degenerative changes and facet arthrosis. No evidence of spondylolisthesis. Degenerative curvature of lumbar spine noted.  Mild idiopathic scoliosis with progression due to degenerative changes.      ASSESSMENT:  1. Acute left-sided low back pain without sciatica  2. DDD (degenerative disc disease), lumbar  3. Degenerative scoliosis      PLAN:  84 y.o. female with low back pain and lumbar DDD.     Patient has found relief with one aqua therapy session. Patient is encouraged to continue with physical therapy at this time at the new location. An updated physical therapy script was provided today.     Patient will follow-up in 3 months for re-evaluation. If symptoms persist, then MRI of lumbar spine may be ordered.        Scribe Attestation      I,:  Sandra Levin PA-C am acting as a scribe while in the presence of the attending physician.:       I,:  Angelique Orourke MD personally performed the services described in this documentation    as scribed in my presence.:

## 2024-06-14 ENCOUNTER — EVALUATION (OUTPATIENT)
Dept: PHYSICAL THERAPY | Age: 84
End: 2024-06-14
Payer: MEDICARE

## 2024-06-14 DIAGNOSIS — M46.1 SACROILIITIS (HCC): Primary | ICD-10-CM

## 2024-06-14 PROCEDURE — 97113 AQUATIC THERAPY/EXERCISES: CPT

## 2024-06-14 PROCEDURE — 97161 PT EVAL LOW COMPLEX 20 MIN: CPT

## 2024-06-14 NOTE — PROGRESS NOTES
PT Evaluation     Today's date: 2024  Patient name: Lori Byrd  : 1940  MRN: [id number]  Referring provider: Angelique Orourke MD  Dx:   Encounter Diagnosis     ICD-10-CM    1. Sacroiliitis (HCC)  M46.1           Start Time: 1250  Stop Time: 1400  Total time in clinic (min): 70 minutes    Assessment  Impairments: abnormal gait, abnormal muscle firing, abnormal muscle tone, abnormal or restricted ROM, abnormal movement, activity intolerance, impaired balance, impaired physical strength, lacks appropriate home exercise program, pain with function and weight-bearing intolerance  Symptom irritability: low    Assessment details: After gathering a subjective history along with the completion of a chart review and musculoskeletal screen the pt appears to be a good candidate for aquatic therapy to address s/s presenting from the back. Notable findings from eval include painful palpation of L5 and S1 and other objective findings listed below. Aquatic therapy indicated for this patient to create a relaxing environment and to create an environment to improve strength, WB, and ROM outside of the effects of gravity. Further indications include minimizing fall risk/re-injury and providing traction to the low back. Reviewed the rules of the pool, the patients ability to ascend and descend steps, as well as cleared the patient of any s/s that would be contraindicated or a precaution for aquatic therapy.     Pool Contraindications:  Seizures - no   Uncompensated CHF - no   Unstable angina - no   Severe kidney disease - no   Severe PVD - no   Open wounds or occlusive dressings - no   Colostomy- no   Water/airborne infections - no   Risk of bleeding or hemorrhage - no   Lack of bowel or bladder functions - no     Pool Precautions:   Fear of water/inability to swim - no   Respiratory disorders - no   Cardiac dysfunction - no   Small open wounds - no    Understanding of Dx/Px/POC: good     Prognosis: good    Goals  In  4 visits:   1) Pt will improve FOTO score by 7 to meet LTGs  2) Pt will improve 5 x sit to stand by 3-4 seconds to work toward LTGs and show MDC  3) Pt will be able to tolerate a 50 minute PT aquatic tx session to demonstrate improved endurance  4) Pt will be able to complete 10 pallof presses with the tan therabands in the pool to improve neuromuscular control of the back musculature      In 8 visits:   1) Pt will be able to complete 10 pallof pressed with a medium to heavy resistance theraband to be able to lift her laundry basket without pain   2) Pt will improve 5 x sit to stand from 23 seconds to <20 seconds with proper amplitude to stand upright  3) Pt will improve FOTO score from 56 pts to 70 pts to demonstrate a measurable change in physical status   4) Pt will demonstrate 100% competency of HEP to be appropriate for D/C from therapy after goals have been met, pt is functioning at PLOF, and/or the pt displays significant improvement.    5) Pt will not be tender to palpation in the L5-S1 segments to demonstrate decreased inflammation.     Plan  Patient would benefit from: skilled physical therapy  Referral necessary: No    Planned therapy interventions: abdominal trunk stabilization, activity modification, ADL retraining, ADL training, aquatic therapy, balance, balance/weight bearing training, body mechanics training, flexibility, functional ROM exercises, gait training, graded activity, graded exercise, graded motor, home exercise program, IADL retraining, whirlpool, transfer training, therapeutic training, therapeutic exercise, therapeutic activities, strengthening, stretching, manual therapy, massage, neuromuscular re-education and nerve gliding    Frequency: 2x week  Duration in weeks: 4  Plan of Care beginning date: 6/14/2024  Plan of Care expiration date: 9/12/2024  Treatment plan discussed with: patient and PTA  Plan details: Pt was in agreement that they will be treated by myself in combination with a  PTA. Further, informed that we work as a team, the PTAs follow the POC created by the PT, and I trust them to make safe and reasonable changes when appropriate under their scope of practice.      Pt was educated on the nature of their dx and the benefits of completing physical therapy. Additionally, pt is encouraged to ask questions regarding their dx and POC.          Subjective Evaluation    History of Present Illness  Mechanism of injury: A month ago patient was having acute low back pain on the left pain that seems to have gotten; however, now her chief complaint is R sided back pain. She notices weakness in her LE and difficulty with floor and sit to stand transfers. She is able to sleep undistributed through the night. She denies radiating symptoms into her legs.             Not a recurrent problem   Quality of life: good    Patient Goals  Patient goals for therapy: decreased pain, increased motion, increased strength and return to sport/leisure activities  Patient goal: sttrengthening  Pain  Current pain ratin  At best pain ratin  At worst pain ratin  Location: R side low back  Quality: dull ache  Relieving factors: heat, rest and relaxation  Aggravating factors: standing and lifting (weakness with stair climbing)  Progression: no change    Social Support  Steps to enter house: yes  2  Stairs in house: yes   Lives in: multiple-level home  Lives with: significant other    Employment status: not working  Hand dominance: right  Exercise history: yoga, recumbent bike,      Diagnostic Tests  No diagnostic tests performed        Objective     Tenderness     Lumbar Spine  Tenderness in the spinous process.     Right Hip   Tenderness in the PSIS.     Additional Tenderness Details  Most painful to palpation at R PSIS and L5/S1 vertebral segment     Neurological Testing     Reflexes   Left   Patellar (L4): trace (1+)  Achilles (S1): trace (1+)    Right   Patellar (L4): trace (1+)  Achilles (S1): trace  (1+)  Mechanical Assessment    Cervical      Thoracic      Lumbar    Standing flexion: repeated movements   Pain location:no change  Standing extension: repeated movements  Pain location: no change  Left Sidegliding: repeated movements  Pain location: no change  Right sidegliding: repeated movements  Pain location: no change  uncomfortable    Tests     Lumbar   Negative SIJ compression and sacroiliac distraction.     Left   Negative crossed SLR and passive SLR.     Right   Negative crossed SLR and passive SLR.     Left Pelvic Girdle/Sacrum   Negative: active SLR test.     Right Pelvic Girdle/Sacrum   Negative: active SLR test.     Additional Tests Details  Observable leg length discrepancy, performed MET technique for a Long to Short on the L      Functional Assessment        Comments  5 x sit to stand: 223 seconds   - push up from knees  - slow amplitude  - not full extension of the knees and back     mCTSIB: passed all conditions    General Comments:      Hip Comments   Weak and painless     Knee Comments  Weak and painless     Ankle/Foot Comments   Weak and painless              POC expires Unit limit Auth Expiration date PT/OT + Visit Limit?   9/13/24 BOMN  BOMN                           Visit/Unit Tracking  AUTH Status:  Date 6/13              No auth needed  Used 1               Remaining                 FOTO 56 (70)                        Precautions:  Daily Treatment Diary     Date 6/13          Patient education           Water Walk (FW, BW, side) x10’  5'          LE work at wall               Hip FF/ext swing  2            Toe/heel  1            Hip ABD/ADD  2            March 1            Knee flexion & extension 1            Squats 1          UE noodle x3             Push/pull              Rotate  1            Row forward & back  2            OH side bend            UE/Core (AROM, paddles, MB)              ABD/ADD              Horizontal Pec Fly              Alt. arm swing (shld flex/ext)               "Push pull              Single paddle rotation           SLS (EO, EC, ball toss)            Aqua Step (FW, lat, eccentric)            Core work:              MF press (red, blue, blk) Ytb x10 ea            Kickboard press (blue, red)  X10 5\"            Row/ext w/ tubing (red, blue, blk) 2'          Seated on bench             ankle DF/PF              March              ABD/ADD              Knee flexion/extension            DW TX - hang in the deep water              DW ABD/ADD              DW Bicycle              DW Scissor hip flexion/extension              DW DKTC            Stretches              HS at step              Wall stretches              Calf stretch at wall              Other:            Hot Tub - 10 minutes only  10'                   "

## 2024-06-19 ENCOUNTER — OFFICE VISIT (OUTPATIENT)
Dept: PHYSICAL THERAPY | Age: 84
End: 2024-06-19
Payer: MEDICARE

## 2024-06-19 DIAGNOSIS — M46.1 SACROILIITIS (HCC): Primary | ICD-10-CM

## 2024-06-19 PROCEDURE — 97150 GROUP THERAPEUTIC PROCEDURES: CPT

## 2024-06-19 PROCEDURE — 97113 AQUATIC THERAPY/EXERCISES: CPT

## 2024-06-19 NOTE — PROGRESS NOTES
"Daily Note     Today's date: 2024  Patient name: Lori Byrd  : 1940  MRN: [id number]  Referring provider: Angelique Orourke MD  Dx:   Encounter Diagnosis     ICD-10-CM    1. Sacroiliitis (HCC)  M46.1           Start Time: 1500  Stop Time: 1600  Total time in clinic (min): 60 minutes    Subjective: patient reports some LBP with water walking.       Objective: See treatment diary below      Assessment: Tolerated treatment well with program outlined below. Patient required verbal  cueing throughout prescribed exercises for proper execution and dosage. Patient demonstrated fatigue post treatment and would benefit from continued PT.    Patient seen 1:1 for 30 minutes and rest of time group setting.        Plan: Continue per plan of care.      POC expires Unit limit Auth Expiration date PT/OT + Visit Limit?   24 BOMN  BOMN                           Visit/Unit Tracking  AUTH Status:  Date              No auth needed  Used 1 2              Remaining                 FOTO 56 (70)                        Precautions:  Daily Treatment Diary     Date          Patient education JM          Water Walk (FW, BW, side) x10’  5' 4'/4'         LE work at wall               Hip FF/ext swing  2 2           Toe/heel  1 1           Hip ABD/ADD  2 2            1           Knee flexion & extension 1 1           Squats 1 1         UE noodle x3             Push/pull              Rotate  1 1           Row forward & back  2 2           OH side bend            UE/Core (AROM, paddles, MB)              ABD/ADD              Horizontal Pec Fly              Alt. arm swing (shld flex/ext)              Push pull              Single paddle rotation           SLS (EO, EC, ball toss)            Aqua Step (FW, lat, eccentric)            Core work:              MF press (red, blue, blk) Ytb x10 ea nt           Kickboard press (blue, red)  X10 5\" 5\"x10           Row/ext w/ tubing (red, blue, blk) 2' 2'       "   Seated on bench             ankle DF/PF              March              ABD/ADD              Knee flexion/extension            DW TX - hang in the deep water   10           DW ABD/ADD   1           DW Bicycle   4'           DW Scissor hip flexion/extension              DW DKTC            Stretches              HS at step              Wall stretches              Calf stretch at wall              Other:            Hot Tub - 10 minutes only  10' 10'

## 2024-06-21 ENCOUNTER — OFFICE VISIT (OUTPATIENT)
Dept: PHYSICAL THERAPY | Age: 84
End: 2024-06-21
Payer: MEDICARE

## 2024-06-21 DIAGNOSIS — M46.1 SACROILIITIS (HCC): Primary | ICD-10-CM

## 2024-06-21 PROCEDURE — 97113 AQUATIC THERAPY/EXERCISES: CPT

## 2024-06-21 NOTE — PROGRESS NOTES
Daily Note     Today's date: 2024  Patient name: Lori Byrd  : 1940  MRN: [id number]  Referring provider: Angelique Orourke MD  Dx:   Encounter Diagnosis     ICD-10-CM    1. Sacroiliitis (HCC)  M46.1           Start Time: 915  Stop Time: 1015  Total time in clinic (min): 60 minutes    Subjective: Reports her back is sore when she walks a lot       Objective: See treatment diary below  Patient was see 1:1 for 25 min by Rita Acosta PTA, and 15 min by Rita Hoskins PTA, and the remainder of the time in a group setting.     Assessment:  Cues for carryover of program and to ensure proper performance of exercises. Mild discomfort noted across LB with ambulation in water. Monitoring of time to ensure proper dosage of prescribed exercises are performed. Some relief post session. Patient would benefit from continued PT      Plan: Continue per plan of care.      POC expires Unit limit Auth Expiration date PT/OT + Visit Limit?   24 BOMN  BOMN                           Visit/Unit Tracking  AUTH Status:  Date             No auth needed  Used 1 2 3             Remaining                 FOTO 56 (70)                        Precautions:  Daily Treatment Diary     Date         Patient education JM          Water Walk (FW, BW, side) x10’  5' 4'/4' 5'/5'         LE work at wall               Hip FF/ext swing  2 2 2'          Toe/heel  1 1 1'          Hip ABD/ADD  2 2 2'          March 1 1 1'          Knee flexion & extension 1 1 1'          Squats 1 1 1'        UE noodle x3             Push/pull              Rotate  1 1 1'          Row forward & back  2 2 2'          OH side bend            UE/Core (AROM, paddles, MB)              ABD/ADD              Horizontal Pec Fly              Alt. arm swing (shld flex/ext)              Push pull              Single paddle rotation           SLS (EO, EC, ball toss)            Aqua Step (FW, lat, eccentric)            Core work:               "MF press (red, blue, blk) Ytb x10 ea nt YTB 5\"x5          Kickboard press (blue, red)  X10 5\" 5\"x10 5\"x10          Row/ext w/ tubing (red, blue, blk) 2' 2' 2' YTB         Seated on bench             ankle DF/PF              March              ABD/ADD              Knee flexion/extension            DW TX - hang in the deep water   10 10'          DW ABD/ADD   1 1'          DW Bicycle   4' 5'           DW Scissor hip flexion/extension              DW DKTC            Stretches              HS at step              Wall stretches              Calf stretch at wall              Other:            Hot Tub - 10 minutes only  10' 10' 10'                   "

## 2024-06-25 ENCOUNTER — OFFICE VISIT (OUTPATIENT)
Dept: PHYSICAL THERAPY | Age: 84
End: 2024-06-25
Payer: MEDICARE

## 2024-06-25 DIAGNOSIS — M46.1 SACROILIITIS (HCC): Primary | ICD-10-CM

## 2024-06-25 PROCEDURE — 97113 AQUATIC THERAPY/EXERCISES: CPT | Performed by: PHYSICAL THERAPIST

## 2024-06-25 NOTE — PROGRESS NOTES
"Daily Note     Today's date: 2024  Patient name: Lori Byrd  : 1940  MRN: [id number]  Referring provider: Angelique Orourke MD  Dx:   Encounter Diagnosis     ICD-10-CM    1. Sacroiliitis (HCC)  M46.1           Start Time: 0930  Stop Time: 1000  Total time in clinic (min): 30 minutes    Subjective: same      Objective: See treatment diary below      Assessment: Tolerated treatment fair. Patient demonstrated fatigue post treatment, exhibited good technique with therapeutic exercises, and would benefit from continued PT, slow steady movements with all exercises with decreased core strength noted      Plan: Progress treatment as tolerated.       POC expires Unit limit Auth Expiration date PT/OT + Visit Limit?   24 BOMN  BOMN                           Visit/Unit Tracking  AUTH Status:  Date            No auth needed  Used 1 2 3 4            Remaining                 FOTO 56 (70)                        Precautions:  Daily Treatment Diary     Date        Patient education JM          Water Walk (FW, BW, side) x10’  5' 4'/4' 5'/5'  10       LE work at wall               Hip FF/ext swing  2 2 2' 2         Toe/heel  1 1 1' 1         Hip ABD/ADD  2 2 2' 2         March 1 1 1' 1         Knee flexion & extension 1 1 1' 1         Squats 1 1 1' 1       UE noodle x3             Push/pull              Rotate  1 1 1' 1         Row forward & back  2 2 2' 2         OH side bend            UE/Core (AROM, paddles, MB)              ABD/ADD              Horizontal Pec Fly              Alt. arm swing (shld flex/ext)              Push pull              Single paddle rotation           SLS (EO, EC, ball toss)            Aqua Step (FW, lat, eccentric)            Core work:              MF press (red, blue, blk) Ytb x10 ea nt YTB 5\"x5 5x         Kickboard press (blue, red)  X10 5\" 5\"x10 5\"x10 10x         Row/ext w/ tubing (red, blue, blk) 2' 2' 2' YTB  2       Seated on bench         "     ankle DF/PF              March              ABD/ADD              Knee flexion/extension            DW TX - hang in the deep water   10 10' 10         DW ABD/ADD   1 1' 1         DW Bicycle   4' 5'  5         DW Scissor hip flexion/extension              DW DKTC            Stretches              HS at step              Wall stretches              Calf stretch at wall              Other:            Hot Tub - 10 minutes only  10' 10' 10' 10

## 2024-07-03 ENCOUNTER — OFFICE VISIT (OUTPATIENT)
Dept: PHYSICAL THERAPY | Age: 84
End: 2024-07-03
Payer: MEDICARE

## 2024-07-03 DIAGNOSIS — M46.1 SACROILIITIS (HCC): Primary | ICD-10-CM

## 2024-07-03 PROCEDURE — 97150 GROUP THERAPEUTIC PROCEDURES: CPT

## 2024-07-03 PROCEDURE — 97113 AQUATIC THERAPY/EXERCISES: CPT

## 2024-07-03 NOTE — PROGRESS NOTES
"Daily Note     Today's date: 7/3/2024  Patient name: Lori Byrd  : 1940  MRN: [id number]  Referring provider: Angelique Orourke MD  Dx:   Encounter Diagnosis     ICD-10-CM    1. Sacroiliitis (HCC)  M46.1           Start Time: 0900  Stop Time: 1000  Total time in clinic (min): 60 minutes    Subjective: pt notes feeling pretty good today.       Objective: See treatment diary below  Pt seen 1:1 for 30 min and group therapy for remainder    Assessment: Tolerated treatment well. Pt doing well overall w/ aquatic PT. Today she had some R LBP w/ MF press outs. Patient would benefit from continued PT      Plan: Continue per plan of care.      POC expires Unit limit Auth Expiration date PT/OT + Visit Limit?   24 BOMN  BOMN                           Visit/Unit Tracking  AUTH Status:  Date 6/13 6/19 6/21 6/25 7/3          No auth needed  Used 1 2 3 4 5           Remaining                 FOTO 56 (70)                        Precautions:  Daily Treatment Diary     Date 6/13 6/19 6/21 6/25 7/3      Patient education JM          Water Walk (FW, BW, side) x10’  5' 4'/4' 5'/5'  10 10      LE work at wall               Hip FF/ext swing  2 2 2' 2 2        Toe/heel  1 1  1        Hip ABD/ADD  2 2 2' 2 2         1 ' 1 1        Knee flexion & extension 1 1 1' 1 1        Squats 1 1 ' 1 1      UE noodle x3             Push/pull              Rotate  1 1 1' 1 1        Row forward & back  2 2 2' 2 2        OH side bend            UE/Core (AROM, paddles, MB)              ABD/ADD              Horizontal Pec Fly              Alt. arm swing (shld flex/ext)              Push pull              Single paddle rotation           SLS (EO, EC, ball toss)            Aqua Step (FW, lat, eccentric)            Core work:              MF press (red, blue, blk) Ytb x10 ea nt YTB 5\"x5 5x Y5;;x5ea        Kickboard press (blue, red)  X10 5\" 5\"x10 5\"x10 10x 5''x10 R        Row/ext w/ tubing (red, blue, blk) 2' 2' 2' YTB  2 2 Y    "   Seated on bench             ankle DF/PF              March              ABD/ADD              Knee flexion/extension            DW TX - hang in the deep water   10 10' 10 10        DW ABD/ADD   1 1' 1 1        DW Bicycle   4' 5'  5 5        DW Scissor hip flexion/extension              DW DKTC            Stretches              HS at step      2        Wall stretches              Calf stretch at wall              Other:            Hot Tub - 10 minutes only  10' 10' 10' 10 10

## 2024-07-05 ENCOUNTER — OFFICE VISIT (OUTPATIENT)
Dept: PHYSICAL THERAPY | Age: 84
End: 2024-07-05
Payer: MEDICARE

## 2024-07-05 DIAGNOSIS — M46.1 SACROILIITIS (HCC): Primary | ICD-10-CM

## 2024-07-05 PROCEDURE — 97113 AQUATIC THERAPY/EXERCISES: CPT

## 2024-07-05 NOTE — PROGRESS NOTES
"Daily Note     Today's date: 2024  Patient name: Lori Byrd  : 1940  MRN: [id number]  Referring provider: Angelique Orourke MD  Dx:   Encounter Diagnosis     ICD-10-CM    1. Sacroiliitis (HCC)  M46.1           Start Time: 0900  Stop Time: 1000  Total time in clinic (min): 60 minutes    Subjective: pt notes she had no pain yesterday but woke up today w/ L LBP 3/10 maybe from the weather.       Objective: See treatment diary below  Pt seen 1:1 for 30 min and group therapy for remainder    Assessment: Tolerated treatment well. Pt able to complete all ex's w/o increased complaints. would benefit from continued PT      Plan: Continue per plan of care.      POC expires Unit limit Auth Expiration date PT/OT + Visit Limit?   24 BOMN  BOMN                           Visit/Unit Tracking  AUTH Status:  Date 6/13 6/19 6/21 6/25 7/3 7/5         No auth needed  Used 1 2 3 4 5 6          Remaining                 FOTO 56 (70)                        Precautions:  Daily Treatment Diary     Date 6/13 6/19 6/21 6/25 7/3 7/5     Patient education JM          Water Walk (FW, BW, side) x10’  5' 4'/4' 5'/5'  10 10 10     LE work at wall               Hip FF/ext swing  2 2 2' 2 2 2       Toe/heel  1 1 1' 1 1 1       Hip ABD/ADD  2 2 2' 2 2 2       March 1 1 1' 1 1 1       Knee flexion & extension 1 1 1' 1 1 1       Squats 1 1 1' 1 1 1     UE noodle x3             Push/pull              Rotate  1 1 1' 1 1 1       Row forward & back  2 2 2' 2 2 2       OH side bend            UE/Core (AROM, paddles, MB)              ABD/ADD              Horizontal Pec Fly              Alt. arm swing (shld flex/ext)              Push pull              Single paddle rotation           SLS (EO, EC, ball toss)            Aqua Step (FW, lat, eccentric)            Core work:              MF press (red, blue, blk) Ytb x10 ea nt YTB 5\"x5 5x Y5;;x5ea 5''x5ea Y       Kickboard press (blue, red)  X10 5\" 5\"x10 5\"x10 10x 5''x10 R 5;;x10 r       " Row/ext w/ tubing (red, blue, blk) 2' 2' 2' YTB  2 2 Y 2 y     Seated on bench             ankle DF/PF              March              ABD/ADD              Knee flexion/extension            DW TX - hang in the deep water   10 10' 10 10 10       DW ABD/ADD   1 1' 1 1        DW Bicycle   4' 5'  5 5 5       DW Scissor hip flexion/extension              DW DKTC            Stretches              HS at step      2 2       Wall stretches              Calf stretch at wall              Other:            Hot Tub - 10 minutes only  10' 10' 10' 10 10 10

## 2024-07-17 ENCOUNTER — EVALUATION (OUTPATIENT)
Dept: PHYSICAL THERAPY | Age: 84
End: 2024-07-17
Payer: MEDICARE

## 2024-07-17 DIAGNOSIS — M46.1 SACROILIITIS (HCC): Primary | ICD-10-CM

## 2024-07-17 PROCEDURE — 97113 AQUATIC THERAPY/EXERCISES: CPT

## 2024-07-17 NOTE — PROGRESS NOTES
PT Re-Evaluation     Today's date: 2024  Patient name: Lori Byrd  : 1940  MRN: [id number]  Referring provider: Angelique Orourke MD  Dx:   Encounter Diagnosis     ICD-10-CM    1. Sacroiliitis (HCC)  M46.1             Start Time: 0830  Stop Time: 0930  Total time in clinic (min): 60 minutes    Assessment  Impairments: abnormal gait, abnormal muscle firing, abnormal muscle tone, abnormal or restricted ROM, abnormal movement, activity intolerance, impaired balance, impaired physical strength, lacks appropriate home exercise program, pain with function and weight-bearing intolerance  Symptom irritability: low    Assessment details: After gathering a subjective history along with the completion of a chart review and musculoskeletal screen the pt appears to be a good candidate for aquatic therapy to address s/s presenting from the back. Notable findings from eval include painful palpation of L5 and S1 and other objective findings listed below. Aquatic therapy indicated for this patient to create a relaxing environment and to create an environment to improve strength, WB, and ROM outside of the effects of gravity. Further indications include minimizing fall risk/re-injury and providing traction to the low back. Reviewed the rules of the pool, the patients ability to ascend and descend steps, as well as cleared the patient of any s/s that would be contraindicated or a precaution for aquatic therapy.     24  Compared re-evaluation findings to initial evaluation findings her subjective and the frequency of her pain has improved, along with her special tests and functional testing. Her FOTO however remained the same. Pt has met partial therapy goals. Pt is still a candidate for skilled physical therapy.       Pool Contraindications:  Seizures - no   Uncompensated CHF - no   Unstable angina - no   Severe kidney disease - no   Severe PVD - no   Open wounds or occlusive dressings - no   Colostomy- no    Water/airborne infections - no   Risk of bleeding or hemorrhage - no   Lack of bowel or bladder functions - no     Pool Precautions:   Fear of water/inability to swim - no   Respiratory disorders - no   Cardiac dysfunction - no   Small open wounds - no    Understanding of Dx/Px/POC: good     Prognosis: good    Goals  In 4 visits:   1) Pt will improve FOTO score by 7 to meet LTGs - not met   2) Pt will improve 5 x sit to stand by 3-4 seconds to work toward LTGs and show MDC - met  3) Pt will be able to tolerate a 50 minute PT aquatic tx session to demonstrate improved endurance - met  4) Pt will be able to complete 10 pallof presses with the tan therabands in the pool to improve neuromuscular control of the back musculature  - met     In 8 visits:   1) Pt will be able to complete 10 pallof pressed with a medium to heavy resistance theraband to be able to lift her laundry basket without pain - progressing   2) Pt will improve 5 x sit to stand from 23 seconds to <20 seconds with proper amplitude to stand upright - met  3) Pt will improve FOTO score from 56 pts to 70 pts to demonstrate a measurable change in physical status - progressing  4) Pt will demonstrate 100% competency of HEP to be appropriate for D/C from therapy after goals have been met, pt is functioning at PLOF, and/or the pt displays significant improvement.    5) Pt will not be tender to palpation in the L5-S1 segments to demonstrate decreased inflammation - progressing     Plan  Patient would benefit from: skilled physical therapy  Referral necessary: No    Planned therapy interventions: abdominal trunk stabilization, activity modification, ADL retraining, ADL training, aquatic therapy, balance, balance/weight bearing training, body mechanics training, flexibility, functional ROM exercises, gait training, graded activity, graded exercise, graded motor, home exercise program, IADL retraining, whirlpool, transfer training, therapeutic training,  therapeutic exercise, therapeutic activities, strengthening, stretching, manual therapy, massage, neuromuscular re-education and nerve gliding    Frequency: 2x week  Duration in weeks: 4  Plan of Care beginning date: 2024  Plan of Care expiration date: 2024  Treatment plan discussed with: patient and PTA  Plan details: Pt was in agreement that they will be treated by myself in combination with a PTA. Further, informed that we work as a team, the PTAs follow the POC created by the PT, and I trust them to make safe and reasonable changes when appropriate under their scope of practice.      Pt was educated on the nature of their dx and the benefits of completing physical therapy. Additionally, pt is encouraged to ask questions regarding their dx and POC.          Subjective Evaluation    History of Present Illness  Mechanism of injury: A month ago patient was having acute low back pain on the left pain that seems to have gotten; however, now her chief complaint is R sided back pain. She notices weakness in her LE and difficulty with floor and sit to stand transfers. She is able to sleep undistributed through the night. She denies radiating symptoms into her legs.     24  Pt says that while completing therapy it eases her discomfort and she feels better. She is experiencing less frequent pains in her L buttuck. Her back becomes aggravated or if she stands to long the R side of her back continues to irritate her.                Not a recurrent problem   Quality of life: good    Patient Goals  Patient goals for therapy: decreased pain, increased motion, increased strength and return to sport/leisure activities  Patient goal: sttrengthening  Pain  Current pain ratin  At best pain ratin  At worst pain ratin  Location: R side low back  Quality: dull ache  Relieving factors: heat, rest and relaxation  Aggravating factors: standing and lifting (weakness with stair climbing)  Progression: no  change    Social Support  Steps to enter house: yes  2  Stairs in house: yes   Lives in: multiple-level home  Lives with: significant other    Employment status: not working  Hand dominance: right  Exercise history: yoga, recumbent bike,      Diagnostic Tests  No diagnostic tests performed        Objective     Tenderness     Lumbar Spine  Tenderness in the spinous process.     Right Hip   Tenderness in the PSIS.     Additional Tenderness Details  Most painful to palpation at R PSIS and L5/S1 vertebral segment     Neurological Testing     Reflexes   Left   Patellar (L4): trace (1+)  Achilles (S1): trace (1+)    Right   Patellar (L4): trace (1+)  Achilles (S1): trace (1+)  Mechanical Assessment    Cervical      Thoracic      Lumbar    Standing flexion: repeated movements   Pain location:no change  Standing extension: repeated movements  Pain location: no change  Left Sidegliding: repeated movements  Pain location: no change  Right sidegliding: repeated movements  Pain location: no change  uncomfortable    Tests     Lumbar   Negative SIJ compression and sacroiliac distraction.     Left   Negative crossed SLR and passive SLR.     Right   Negative crossed SLR and passive SLR.     Left Pelvic Girdle/Sacrum   Negative: active SLR test.     Right Pelvic Girdle/Sacrum   Negative: active SLR test.     Additional Tests Details  Observable leg length discrepancy, performed MET technique for a Long to Short on the L      Functional Assessment        Comments  5 x sit to stand: 23 seconds   - push up from knees  - slow amplitude  - not full extension of the knees and back     mCTSIB: passed all conditions    7/17/24  5 x sit to stand: 16 seconds     General Comments:      Hip Comments   Weak and painless     Knee Comments  Weak and painless     Ankle/Foot Comments   Weak and painless              POC expires Unit limit Auth Expiration date PT/OT + Visit Limit?   9/13/24 BOMN  BOMN                           Visit/Unit  "Tracking  AUTH Status:  Date 6/13 6/19 6/21 6/25 7/3 7/5 7/17        No auth needed  Used 1 2 3 4 5 6 7         Remaining                 FOTO 56 (70)      56/70                  Precautions:  Daily Treatment Diary     Date 6/13 6/19 6/21 6/25 7/3 7/5 7/17    Patient education JM          Water Walk (FW, BW, side) x10’  5' 4'/4' 5'/5'  10 10 10 5'    LE work at wall               Hip FF/ext swing  2 2 2' 2 2 2       Toe/heel  1 1 1' 1 1 1       Hip ABD/ADD  2 2 2' 2 2 2       March 1 1 1' 1 1 1       Knee flexion & extension 1 1 1' 1 1 1       Squats 1 1 1' 1 1 1     UE noodle x3             Push/pull              Rotate  1 1 1' 1 1 1       Row forward & back  2 2 2' 2 2 2       OH side bend            UE/Core (AROM, paddles, MB)              ABD/ADD              Horizontal Pec Fly              Alt. arm swing (shld flex/ext)              Push pull              Single paddle rotation           SLS (EO, EC, ball toss)        3x15\" SL ea     Aqua Step (FW, lat, eccentric)        Lateral nv     Core work:              MF press (red, blue, blk) Ytb x10 ea nt YTB 5\"x5 5x Y5;;x5ea 5''x5ea Y Y 15x5\" ea      Kickboard press (blue, red)  X10 5\" 5\"x10 5\"x10 10x 5''x10 R 5;;x10 r R 10x5\"       Row/ext w/ tubing (red, blue, blk) 2' 2' 2' YTB  2 2 Y 2 y Y 2'     Seated on bench             ankle DF/PF              March              ABD/ADD              Knee flexion/extension            DW TX - hang in the deep water   10 10' 10 10 10       DW ABD/ADD   1 1' 1 1  1'      DW Bicycle   4' 5'  5 5 5       DW Scissor hip flexion/extension              DW DKTC            Stretches              HS at step      2 2       Wall stretches              Calf stretch at wall              Other:            Hot Tub - 10 minutes only  10' 10' 10' 10 10 10 10'                       "

## 2024-07-19 ENCOUNTER — OFFICE VISIT (OUTPATIENT)
Dept: PHYSICAL THERAPY | Age: 84
End: 2024-07-19
Payer: MEDICARE

## 2024-07-19 DIAGNOSIS — M46.1 SACROILIITIS (HCC): Primary | ICD-10-CM

## 2024-07-19 PROCEDURE — 97113 AQUATIC THERAPY/EXERCISES: CPT

## 2024-07-19 NOTE — PROGRESS NOTES
"Daily Note     Today's date: 2024  Patient name: Lori Byrd  : 1940  MRN: [id number]  Referring provider: Angelique Orourke MD  Dx:   Encounter Diagnosis     ICD-10-CM    1. Sacroiliitis (HCC)  M46.1                      Subjective: Notes she is feeling pretty good today. Pain on L glut if carrying heavy things.       Objective: See treatment diary below      Assessment: Added lateral step ups to encourage glut med activation. Challenged with SLS due to weakness. Positive response to DWT. Patient would benefit from continued PT.       Plan: Continue per plan of care.      POC expires Unit limit Auth Expiration date PT/OT + Visit Limit?   24 BOMN  BOMN                           Visit/Unit Tracking  AUTH Status:  Date 6/13 6/19 6/21 6/25 7/3 7/5 7/17 7/19       No auth needed  Used 1 2 3 4 5 6 7 8        Remaining                 FOTO 56 (70)      56/70                  Precautions:  Daily Treatment Diary     Date 6/13 6/19 6/21 6/25 7/3 7/5 7/17 7/19   Patient education JM          Water Walk (FW, BW, side) x10’  5' 4'/4' 5'/5'  10 10 10 5' 10'   LE work at wall               Hip FF/ext swing  2 2 2' 2 2 2  2     Toe/heel  1 1 1' 1 1 1  1     Hip ABD/ADD  2 2 2' 2 2 2  2      1 1' 1 1 1  1     Knee flexion & extension 1 1 1' 1 1 1  1     Squats 1 1 1' 1 1 1  1   UE noodle x3             Push/pull              Rotate  1 1 1' 1 1 1  1     Row forward & back  2 2 2' 2 2 2  2     OH side bend            UE/Core (AROM, paddles, MB)              ABD/ADD              Horizontal Pec Fly              Alt. arm swing (shld flex/ext)              Push pull              Single paddle rotation           SLS (EO, EC, ball toss)        3x15\" SL ea  3x15\" ea   Aqua Step (FW, lat, eccentric)        Lateral nv  2'   Core work:              MF press (red, blue, blk) Ytb x10 ea nt YTB 5\"x5 5x Y5;;x5ea 5''x5ea Y Y 15x5\" ea Y 15\"x15 ea     Kickboard press (blue, red)  X10 5\" 5\"x10 5\"x10 10x 5''x10 R 5;;x10 r R " "10x5\"  R 10x5\"     Row/ext w/ tubing (red, blue, blk) 2' 2' 2' YTB  2 2 Y 2 y Y 2'  Y 2'   Seated on bench             ankle DF/PF              March              ABD/ADD              Knee flexion/extension            DW TX - hang in the deep water   10 10' 10 10 10  10'     DW ABD/ADD   1 1' 1 1  1'      DW Bicycle   4' 5'  5 5 5  5'     DW Scissor hip flexion/extension              DW DKTC            Stretches              HS at step      2 2  2'     Wall stretches              Calf stretch at wall              Other:            Hot Tub - 10 minutes only  10' 10' 10' 10 10 10 10' 10'                      "

## 2024-07-22 ENCOUNTER — OFFICE VISIT (OUTPATIENT)
Dept: PHYSICAL THERAPY | Age: 84
End: 2024-07-22
Payer: MEDICARE

## 2024-07-22 DIAGNOSIS — M46.1 SACROILIITIS (HCC): Primary | ICD-10-CM

## 2024-07-22 PROCEDURE — 97113 AQUATIC THERAPY/EXERCISES: CPT

## 2024-07-22 NOTE — PROGRESS NOTES
"Daily Note     Today's date: 2024  Patient name: Lori Byrd  : 1940  MRN: [id number]  Referring provider: Angelique Orourke MD  Dx:   Encounter Diagnosis     ICD-10-CM    1. Sacroiliitis (HCC)  M46.1                      Subjective: Notes she is feeling pretty good today noting that the water really seems to help.       Objective: See treatment diary below      Assessment: Continued to focus on glute strengthening this session. Challenged with SLS and newly added step ups due to weakness. Positive response to DWT. Patient would benefit from continued PT.       Plan: Continue per plan of care.      POC expires Unit limit Auth Expiration date PT/OT + Visit Limit?   24 BOMN  BOMN                           Visit/Unit Tracking  AUTH Status:  Date 6/13 6/19 6/21 6/25 7/3 7/5 7/17 7/19 7/22      No auth needed  Used 1 2 3 4 5 6 7 8 9         Remaining                 FOTO 56 (70)      56/70                  Precautions:  Daily Treatment Diary     Date 6/25 7/3 7/5 7/17 7/19 7/22   Patient education         Water Walk (FW, BW, side) x10’  10 10 10 5' 10' 10'   LE work at wall             Hip FF/ext swing  2 2 2  2 2     Toe/heel  1 1 1  1 1     Hip ABD/ADD  2 2 2  2 2     March 1 1 1  1 1     Knee flexion & extension 1 1 1  1 1     Squats 1 1 1  1 1   UE noodle x3           Push/pull            Rotate  1 1 1  1 1     Row forward & back  2 2 2  2 2     OH side bend          UE/Core (AROM, paddles, MB)            ABD/ADD            Horizontal Pec Fly            Alt. arm swing (shld flex/ext)            Push pull            Single paddle rotation         SLS (EO, EC, ball toss)     3x15\" SL ea  3x15\" ea 3x15\" ea   Aqua Step (FW, lat, eccentric)     Lateral nv  2' 2'   Core work:            MF press (red, blue, blk) 5x Y5;;x5ea 5''x5ea Y Y 15x5\" ea Y 15\"x15 ea Y 15\"x15 ea     Kickboard press (blue, red)  10x 5''x10 R 5;;x10 r R 10x5\"  R 10x5\" R 10x5\"     Row/ext w/ tubing (red, blue, blk) 2 2 Y 2 y Y 2'  " Y 2' Y 2'   Seated on bench           ankle DF/PF            March            ABD/ADD            Knee flexion/extension          DW TX - hang in the deep water  10 10 10  10' 010'     DW ABD/ADD  1 1  1'       DW Bicycle  5 5 5  5' 5'     DW Scissor hip flexion/extension            DW DKTC          Stretches            HS at step   2 2  2' 2'     Wall stretches            Calf stretch at wall            Other:          Hot Tub - 10 minutes only  10 10 10 10' 10' 10'

## 2024-07-24 NOTE — PROGRESS NOTES
"Daily Note     Today's date: 2024  Patient name: Lori Byrd  : 1940  MRN: [id number]  Referring provider: Angelique Orourke MD  Dx:   Encounter Diagnosis     ICD-10-CM    1. Sacroiliitis (HCC)  M46.1                      Subjective: Patient offers no new complaints.       Objective: See treatment diary below      Assessment: Tolerated treatment well. Patient demonstrated fatigue post treatment and would benefit from continued PT      Plan: Continue per plan of care.     Patient seen 1:1 for 30 minutes and rest of time group setting.       POC expires Unit limit Auth Expiration date PT/OT + Visit Limit?   24 BOMN  BOMN                           Visit/Unit Tracking  AUTH Status:  Date 6/13 6/19 6/21 6/25 7/3 7/5 7/17 7/19 7/22 7/25     No auth needed  Used 1 2 3 4 5 6 7 8 9   10      Remaining                 FOTO 56 (70)      56/70                  Precautions:  Daily Treatment Diary     Date 6/25 7/3 7/5 7/17 7/19 7/22 7/25   Patient education          Water Walk (FW, BW, side) x10’  10 10 10 5' 10' 10' 10'   LE work at wall              Hip FF/ext swing  2 2 2  2 2 2     Toe/heel  1 1 1  1 1 1     Hip ABD/ADD  2 2 2  2 2 2     March 1 1 1  1 1 1     Knee flexion & extension 1 1 1  1 1 1     Squats 1 1 1  1 1 1   UE noodle x3            Push/pull             Rotate  1 1 1  1 1 1     Row forward & back  2 2 2  2 2 2     OH side bend           UE/Core (AROM, paddles, MB)             ABD/ADD             Horizontal Pec Fly             Alt. arm swing (shld flex/ext)             Push pull             Single paddle rotation          SLS (EO, EC, ball toss)     3x15\" SL ea  3x15\" ea 3x15\" ea 3x15\"   Aqua Step (FW, lat, eccentric)     Lateral nv  2' 2' 2'   Core work:             MF press (red, blue, blk) 5x Y5;;x5ea 5''x5ea Y Y 15x5\" ea Y 15\"x15 ea Y 15\"x15 ea 15x5\"     Kickboard press (blue, red)  10x 5''x10 R 5;;x10 r R 10x5\"  R 10x5\" R 10x5\" 10x5' R     Row/ext w/ tubing (red, blue, blk) 2 2 Y 2 y " Y 2'  Y 2' Y 2' 2'   Seated on bench            ankle DF/PF             March             ABD/ADD             Knee flexion/extension           DW TX - hang in the deep water  10 10 10  10' 010' 10     DW ABD/ADD  1 1  1'        DW Bicycle  5 5 5  5' 5' 5     DW Scissor hip flexion/extension             DW DKTC           Stretches             HS at step   2 2  2' 2' 2     Wall stretches             Calf stretch at wall             Other:           Hot Tub - 10 minutes only  10 10 10 10' 10' 10' 10'

## 2024-07-25 ENCOUNTER — OFFICE VISIT (OUTPATIENT)
Dept: PHYSICAL THERAPY | Age: 84
End: 2024-07-25
Payer: MEDICARE

## 2024-07-25 DIAGNOSIS — M46.1 SACROILIITIS (HCC): Primary | ICD-10-CM

## 2024-07-25 PROCEDURE — 97113 AQUATIC THERAPY/EXERCISES: CPT

## 2024-07-29 ENCOUNTER — OFFICE VISIT (OUTPATIENT)
Dept: PHYSICAL THERAPY | Age: 84
End: 2024-07-29
Payer: MEDICARE

## 2024-07-29 DIAGNOSIS — M46.1 SACROILIITIS (HCC): Primary | ICD-10-CM

## 2024-07-29 PROCEDURE — 97113 AQUATIC THERAPY/EXERCISES: CPT

## 2024-07-29 NOTE — PROGRESS NOTES
"Daily Note     Today's date: 2024  Patient name: Lori Byrd  : 1940  MRN: [id number]  Referring provider: Angelique Orourke MD  Dx:   Encounter Diagnosis     ICD-10-CM    1. Sacroiliitis (HCC)  M46.1           Start Time: 0855  Stop Time: 0950  Total time in clinic (min): 55 minutes    Subjective: pt notes LBP continues, 2/10, a little better.       Objective: See treatment diary below      Assessment: Tolerated treatment well. Pt continues to show improvement w/ core ex's in the water. Pt struggles w/ MF press due to sig weakness. Pt continues w/ forward posture cueing for correction. Patient exhibited good technique with therapeutic exercises      Plan: Continue per plan of care.      POC expires Unit limit Auth Expiration date PT/OT + Visit Limit?   24 BOMN  BOMN                           Visit/Unit Tracking  AUTH Status:  Date 6/13 6/19 6/21 6/25 7/3 7/5 7/17 7/19 7/22 7/25 7/29    No auth needed  Used 1 2 3 4 5 6 7 8 9   10 11     Remaining                 FOTO 56 (70)      56/70                  Precautions:  Daily Treatment Diary     Date    Patient education          Water Walk (FW, BW, side) x10’  10 10 10 5' 10' 10' 10'   LE work at wall              Hip FF/ext swing  2 2 2  2 2 2     Toe/heel  1 1 1  1 1 1     Hip ABD/ADD  2 2 2  2 2 2      1#mb 1 1  1 1 1     Knee flexion & extension 1 1#mb 1 1  1 1 1     Squats 1 1 1  1 1 1   UE noodle x3            Push/pull             Rotate  1 1 1  1 1 1     Row forward & back  2 D/c 2  2 2 2     OH side bend           UE/Core (AROM, paddles, MB)             ABD/ADD             Horizontal Pec Fly             Alt. arm swing (shld flex/ext)             Push pull             Single paddle rotation          SLS (EO, EC, ball toss)  30''x2ea   3x15\" SL ea  3x15\" ea 3x15\" ea 3x15\"   Aqua Step (FW, lat, eccentric)  4'   Lateral nv  2' 2' 2'   Core work:             MF press (red, blue, blk) 3''x5ea Y Y5;;x5ea " "5''x5ea Y Y 15x5\" ea Y 15\"x15 ea Y 15\"x15 ea 15x5\"     Kickboard press (blue, red)  Red 5''x10 5''x10 R 5;;x10 r R 10x5\"  R 10x5\" R 10x5\" 10x5' R     Row/ext w/ tubing (red, blue, blk) 2 y 2 Y 2 y Y 2'  Y 2' Y 2' 2'   Seated on bench            ankle DF/PF             March             ABD/ADD             Knee flexion/extension           DW TX - hang in the deep water  10 10 10  10' 010' 10     DW ABD/ADD   1  1'        DW Bicycle  5 5 5  5' 5' 5     DW Scissor hip flexion/extension             DW DKTC           Stretches             HS at step  2 2 2  2' 2' 2     Wall stretches             Calf stretch at wall             Other:           Hot Tub - 10 minutes only  8 10 10 10' 10' 10' 10'                          "

## 2024-07-31 ENCOUNTER — OFFICE VISIT (OUTPATIENT)
Dept: PHYSICAL THERAPY | Age: 84
End: 2024-07-31
Payer: MEDICARE

## 2024-07-31 DIAGNOSIS — M46.1 SACROILIITIS (HCC): Primary | ICD-10-CM

## 2024-07-31 PROCEDURE — 97113 AQUATIC THERAPY/EXERCISES: CPT

## 2024-08-05 ENCOUNTER — OFFICE VISIT (OUTPATIENT)
Dept: PHYSICAL THERAPY | Age: 84
End: 2024-08-05
Payer: MEDICARE

## 2024-08-05 DIAGNOSIS — M46.1 SACROILIITIS (HCC): Primary | ICD-10-CM

## 2024-08-05 PROCEDURE — 97113 AQUATIC THERAPY/EXERCISES: CPT

## 2024-08-05 PROCEDURE — 97150 GROUP THERAPEUTIC PROCEDURES: CPT

## 2024-08-05 NOTE — PROGRESS NOTES
"Daily Note     Today's date: 2024  Patient name: Lori Byrd  : 1940  MRN: [id number]  Referring provider: Angelique Orourke MD  Dx:   Encounter Diagnosis     ICD-10-CM    1. Sacroiliitis (HCC)  M46.1           Start Time: 1300  Stop Time: 1400  Total time in clinic (min): 60 minutes    Subjective: pt notes she continues w/ sig LBP w/ bending over w/ activities at home 6/10 but good relief w/ sitting after 15 min.     Objective: See treatment diary below  Pt seen 1:1 for 30 min and group therapy for remainder    Assessment: Tolerated treatment well. Increased MF press outs today.  Patient would benefit from continued PT      Plan: Continue per plan of care.      POC expires Unit limit Auth Expiration date PT/OT + Visit Limit?   24 BOMN  BOMN                           Visit/Unit Tracking  AUTH Status:  Date 6/13 6/19 6/21 6/25 7/3 7/5 7/17 7/19 7/22 7/25 7/29 7/31   No auth needed  Used 1 2 3 4 5 6 7 8 9   10 11 12    Remaining                 FOTO 56 (70)      56/70            Date             Visit count 13            FOTO                         Precautions:  Daily Treatment Diary     Date    Patient education          Water Walk (FW, BW, side) x10’  10 10 10 510 10' 10' 10'   LE work at wall              Hip FF/ext swing  2 2 2  2 2 2     Toe/heel  1 1 1  1 1 1     Hip ABD/ADD  2 2 2  2 2 2      1#mb 1 1  1 1 1     Knee flexion & extension 1 1#mb 1 1  1 1 1     Squats 1 1 1  1 1 1   UE noodle x3            Push/pull             Rotate  1 1 1  1 1 1     Row forward & back  2 D/c   2 2 2     OH side bend           UE/Core (AROM, paddles, MB)             ABD/ADD             Horizontal Pec Fly             Alt. arm swing (shld flex/ext)             Push pull             Single paddle rotation          SLS (EO, EC, ball toss)  30''x2ea 30\"x2 30''x2 3x15\" SL ea  3x15\" ea 3x15\" ea 3x15\"   Aqua Step (FW, lat, eccentric)  4' 4' 4 Lateral nv  2' 2' 2'   Core " "work:             MF press (red, blue, blk) 3''x5ea Y Y5\"x5ea 5''x7ea Y Y 15x5\" ea Y 15\"x15 ea Y 15\"x15 ea 15x5\"     Kickboard press (blue, red)  Red 5''x10 5''x10 R 5;;x10 r R 10x5\"  R 10x5\" R 10x5\" 10x5' R     Row/ext w/ tubing (red, blue, blk) 2 y 2 Y 2 y Y 2'  Y 2' Y 2' 2'   Seated on bench            ankle DF/PF             March             ABD/ADD             Knee flexion/extension           DW TX - hang in the deep water  10 10 10  10' 010' 10     DW ABD/ADD     1'        DW Bicycle  5 5 5  5' 5' 5     DW Scissor hip flexion/extension             DW DKTC           Stretches             HS at step  2 2 2  2' 2' 2     Wall stretches             Calf stretch at wall             Other:           Hot Tub - 10 minutes only  8 10 10 10' 10' 10' 10'                              "

## 2024-08-07 ENCOUNTER — OFFICE VISIT (OUTPATIENT)
Dept: PHYSICAL THERAPY | Age: 84
End: 2024-08-07
Payer: MEDICARE

## 2024-08-07 DIAGNOSIS — M46.1 SACROILIITIS (HCC): Primary | ICD-10-CM

## 2024-08-07 PROCEDURE — 97113 AQUATIC THERAPY/EXERCISES: CPT

## 2024-08-07 NOTE — PROGRESS NOTES
Daily Note     Today's date: 2024  Patient name: Lori Byrd  : 1940  MRN: [id number]  Referring provider: Angelique Orourke MD  Dx:   Encounter Diagnosis     ICD-10-CM    1. Sacroiliitis (HCC)  M46.1           Start Time: 0800  Stop Time: 0900  Total time in clinic (min): 60 minutes    Subjective: Patient reports that she is doing good today. She notes increased Lbp with activities but better when she is at rest.       Objective: See treatment diary below      Assessment: Tolerated treatment well, no c/o pain with TE, good movements in the water. Continue to focus on core strengthening and functional mobility. Occ cue for ex technique and posture, patient has a good recall of exercises.     PLAN: Patient was D/C today to independent [facility] and was offered our tep down/fitness program today. Primary therapist will d/c orders.         POC expires Unit limit Auth Expiration date PT/OT + Visit Limit?   24 BOMN  BOMN                           Visit/Unit Tracking  AUTH Status:  Date 6/13 6/19 6/21 6/25 7/3 7/5 7/17 7/19 7/22 7/25 7/29 7/31   No auth needed  Used 1 2 3 4 5 6 7 8 9   10 11 12    Remaining                 FOTO 56 (70)      56/70            Date            Visit count 13 14           FOTO                         Precautions:  Daily Treatment Diary     Date    Patient education          Water Walk (FW, BW, side) x10’  10 10 10 510 10' 10' 10'   LE work at wall              Hip FF/ext swing  2 2 2  2 2 2     Toe/heel  1 1 1  1 1 1     Hip ABD/ADD  2 2 2  2 2 2      1#mb 1 1  1 1 1     Knee flexion & extension 1 1#mb 1 1  1 1 1     Squats 1 1 1  1 1 1   UE noodle x3            Push/pull             Rotate  1 1 1  1 1 1     Row forward & back  2 D/c   2 2 2     OH side bend           UE/Core (AROM, paddles, MB)             ABD/ADD             Horizontal Pec Fly             Alt. arm swing (shld flex/ext)             Push pull             Single paddle  "rotation          SLS (EO, EC, ball toss)  30''x2ea 30\"x2 30''x2 3x15\" SL ea  3x15\" ea 3x15\" ea 3x15\"   Aqua Step (FW, lat, eccentric)  4' 4' 4 Lateral nv  4' 2' 2'   Core work:             MF press (red, blue, blk) 3''x5ea Y Y5\"x5ea 5''x7ea Y Y 15x5\" ea 15x5\" Y 15\"x15 ea 15x5\"     Kickboard press (blue, red)  Red 5''x10 5''x10 R 5;;x10 r R 10x5\"  R 10x5\" R 10x5\" 10x5' R     Row/ext w/ tubing (red, blue, blk) 2 y 2 Y 2 y Y 2'  Y 2' Y 2' 2'   Seated on bench            ankle DF/PF             March             ABD/ADD             Knee flexion/extension           DW TX - hang in the deep water  10 10 10  10' 010' 10     DW ABD/ADD     1' 1'       DW Bicycle  5 5 5  5' 5' 5     DW Scissor hip flexion/extension             DW DKTC           Stretches             HS at step  2 2 2  2' 2' 2     Wall stretches             Calf stretch at wall             Other:           Hot Tub - 10 minutes only  8 10 10 10' 10' 10' 10'                                "

## 2024-09-16 ENCOUNTER — APPOINTMENT (OUTPATIENT)
Dept: RADIOLOGY | Facility: CLINIC | Age: 84
End: 2024-09-16
Payer: MEDICARE

## 2024-09-16 ENCOUNTER — OFFICE VISIT (OUTPATIENT)
Dept: URGENT CARE | Facility: CLINIC | Age: 84
End: 2024-09-16
Payer: MEDICARE

## 2024-09-16 VITALS
BODY MASS INDEX: 28 KG/M2 | OXYGEN SATURATION: 100 % | HEIGHT: 64 IN | RESPIRATION RATE: 20 BRPM | DIASTOLIC BLOOD PRESSURE: 58 MMHG | WEIGHT: 164 LBS | SYSTOLIC BLOOD PRESSURE: 126 MMHG | HEART RATE: 48 BPM | TEMPERATURE: 96.8 F

## 2024-09-16 DIAGNOSIS — S89.92XA INJURY OF LEFT LOWER EXTREMITY, INITIAL ENCOUNTER: ICD-10-CM

## 2024-09-16 DIAGNOSIS — S80.12XA CONTUSION OF LEFT LOWER LEG, INITIAL ENCOUNTER: Primary | ICD-10-CM

## 2024-09-16 PROCEDURE — 73590 X-RAY EXAM OF LOWER LEG: CPT

## 2024-09-16 PROCEDURE — G0463 HOSPITAL OUTPT CLINIC VISIT: HCPCS | Performed by: PHYSICIAN ASSISTANT

## 2024-09-16 PROCEDURE — 99214 OFFICE O/P EST MOD 30 MIN: CPT | Performed by: PHYSICIAN ASSISTANT

## 2024-09-16 NOTE — PROGRESS NOTES
[facility]        NAME: Lori Byrd is a 84 y.o. female  : 1940    MRN: [id number]  DATE: 2024  TIME: 10:15 AM      Assessment and Plan     Contusion of left lower leg, initial encounter [S80.12XA]  1. Contusion of left lower leg, initial encounter  XR tibia fibula 2 vw left        Note:   X-rays look negative for acute osseous abnormality/fracture. Awaiting final read from radiologist.  Suspect contusion, but also can't rule out blood clot at this point. Explained this to the patient. Told her that she can either go to the ER now or can wait and see if it gets better/worse and go later. She decided to go later if necessary.   Patient to continue OTC analgesia, ice/heat, and elevate injured area     Patient Instructions   There are no Patient Instructions on file for this visit.     Follow up with primary care provider.   Go to ER if symptoms worsen.    Chief Complaint     Chief Complaint   Patient presents with    Leg Injury     Pt states she was riding her recumbent bike this past Thursday and her left foot slipped off the pedal and the pedal kept moving hitting pratt of her calf and ankle area. Pain is 7/10.         History of Present Illness     Patient presents with left calf pain x 4 days. She was riding her recumbent bike and her left foot slipped off of the pedal, causing the pedal to hit her left calf. She took tylenol with mild relief.         Review of Systems     Review of Systems   Constitutional:  Negative for chills, fatigue and fever.   HENT:  Negative for congestion, ear pain, postnasal drip, rhinorrhea, sinus pressure, sinus pain and sore throat.    Eyes:  Negative for pain and visual disturbance.   Respiratory:  Negative for cough, chest tightness and shortness of breath.    Cardiovascular:  Negative for chest pain and palpitations.   Gastrointestinal:  Negative for abdominal pain, diarrhea, nausea and vomiting.   Genitourinary:  Negative for dysuria and  hematuria.   Musculoskeletal:  Positive for arthralgias, gait problem and joint swelling. Negative for back pain and myalgias.   Skin:  Positive for color change. Negative for rash.   Neurological:  Negative for dizziness, seizures, syncope, numbness and headaches.   All other systems reviewed and are negative.        Current Medications       Current Outpatient Medications:     anastrozole (ARIMIDEX) 1 mg tablet, Take 1 mg by mouth daily, Disp: , Rfl:     aspirin (ECOTRIN LOW STRENGTH) 81 mg EC tablet, Take 81 mg by mouth, Disp: , Rfl:     atorvastatin (LIPITOR) 20 mg tablet, take 1 tablet by mouth every morning, Disp: 90 tablet, Rfl: 3    Calcium-Phosphorus-Vitamin D (Citracal +D3) 250-107-500 MG-MG-UNIT CHEW, Chew, Disp: , Rfl:     dorzolamide-timolol (COSOPT) 22.3-6.8 MG/ML ophthalmic solution, 1 drop 8 am and 8 pm both eyes, Disp: , Rfl:     hydroCHLOROthiazide 12.5 mg tablet, take 1 tablet by mouth every morning, Disp: 90 tablet, Rfl: 1    Multiple Vitamin (multivitamin) capsule, Take 1 capsule by mouth daily, Disp: , Rfl:     ramipril (ALTACE) 10 MG capsule, take 1 capsule by mouth every morning, Disp: 90 capsule, Rfl: 3    ofloxacin (OCUFLOX) 0.3 % ophthalmic solution, , Disp: , Rfl:     Current Allergies     Allergies as of 09/16/2024 - Reviewed 09/16/2024   Allergen Reaction Noted    Brimonidine Other (See Comments) 05/28/2014              The following portions of the patient's history were reviewed and updated as appropriate: allergies, current medications, past family history, past medical history, past social history, past surgical history, and problem list.     Past Medical History:   Diagnosis Date    Breast cancer (HCC) 2021    treated with XRT and lumpectomy right sided    Hyperlipidemia, unspecified     Hypertension     Rotator cuff injury     Right       Past Surgical History:   Procedure Laterality Date    BREAST LUMPECTOMY Right 2021    CATARACT EXTRACTION Bilateral     HYSTERECTOMY    "      Family History   Problem Relation Age of Onset    Heart disease Mother     Heart disease Father     Heart disease Sister          Medications have been verified.        Objective     /58   Pulse (!) 48   Temp (!) 96.8 °F (36 °C)   Resp 20   Ht 5' 4\" (1.626 m)   Wt 74.4 kg (164 lb)   SpO2 100%   BMI 28.15 kg/m²   No LMP recorded. Patient is postmenopausal.         Physical Exam     Physical Exam  Vitals and nursing note reviewed.   Constitutional:       Appearance: Normal appearance. She is normal weight.   HENT:      Head: Normocephalic and atraumatic.   Cardiovascular:      Rate and Rhythm: Normal rate and regular rhythm.      Pulses: Normal pulses.      Heart sounds: Normal heart sounds.   Pulmonary:      Effort: Pulmonary effort is normal.      Breath sounds: Normal breath sounds.   Musculoskeletal:      Comments: Left lower leg: posterolateral aspect of the distal 1/3 with moderate tenderness to palpation, mild ecchymosis. Normal AROM of left ankle and left knee.    Neurological:      General: No focal deficit present.      Mental Status: She is alert and oriented to person, place, and time.   Psychiatric:         Mood and Affect: Mood normal.         Behavior: Behavior normal.       "

## 2024-09-19 ENCOUNTER — APPOINTMENT (EMERGENCY)
Dept: VASCULAR ULTRASOUND | Facility: HOSPITAL | Age: 84
End: 2024-09-19
Payer: MEDICARE

## 2024-09-19 ENCOUNTER — HOSPITAL ENCOUNTER (EMERGENCY)
Facility: HOSPITAL | Age: 84
Discharge: HOME | End: 2024-09-19
Attending: EMERGENCY MEDICINE
Payer: MEDICARE

## 2024-09-19 ENCOUNTER — APPOINTMENT (EMERGENCY)
Dept: RADIOLOGY | Facility: HOSPITAL | Age: 84
End: 2024-09-19
Payer: MEDICARE

## 2024-09-19 VITALS
DIASTOLIC BLOOD PRESSURE: 71 MMHG | OXYGEN SATURATION: 99 % | RESPIRATION RATE: 19 BRPM | TEMPERATURE: 97.7 F | SYSTOLIC BLOOD PRESSURE: 158 MMHG | HEART RATE: 65 BPM

## 2024-09-19 DIAGNOSIS — L03.116 CELLULITIS OF LEFT LOWER EXTREMITY: Primary | ICD-10-CM

## 2024-09-19 PROCEDURE — 93971 EXTREMITY STUDY: CPT | Performed by: SURGERY

## 2024-09-19 PROCEDURE — 99284 EMERGENCY DEPT VISIT MOD MDM: CPT

## 2024-09-19 PROCEDURE — 99284 EMERGENCY DEPT VISIT MOD MDM: CPT | Performed by: EMERGENCY MEDICINE

## 2024-09-19 PROCEDURE — 73590 X-RAY EXAM OF LOWER LEG: CPT

## 2024-09-19 PROCEDURE — 93971 EXTREMITY STUDY: CPT

## 2024-09-19 RX ORDER — CEPHALEXIN 500 MG/1
500 CAPSULE ORAL EVERY 6 HOURS SCHEDULED
Qty: 28 CAPSULE | Refills: 0 | Status: SHIPPED | OUTPATIENT
Start: 2024-09-19 | End: 2024-09-26

## 2024-09-19 RX ADMIN — CEPHALEXIN 500 MG: 250 CAPSULE ORAL at 15:28

## 2024-09-19 NOTE — ED PROVIDER NOTES
Pt Name: Lori Byrd  MRN: [id number]  Birthdate 1940  Age/Sex: 84 y.o. female  Date of evaluation: 9/19/2024  PCP: Tong Claudio MD    CHIEF COMPLAINT    Chief Complaint   Patient presents with    Leg Pain     Left leg and foot injury last week while riding her bike; was seen at urgent care and imaging done. Pain persists.          HPI and MDM    84 y.o. female presenting with left leg pain.  Patient states a week ago while riding the recumbent bike her foot slipped off the pedal and the pedal came back and hit her in the back of the left lower leg.  She was seen at a walk-in clinic on Tuesday and had x-rays which did not show any fractures.  She states however the pain is getting worse.  No repeat trauma.  No fevers or chills.      Differential diagnosis considered includes but not limited to DVT, cellulitis.      ED Course as of 09/19/24 1706   Thu Sep 19, 2024   1403 XR tibia fibula 2 views LEFT  Per my independent interpretation, no acute fracture or dislocation, no soft tissue gas.      Ultrasound results as below.  No DVT.  Concern primarily for cellulitis. Doubt NSTI. Started on antibiotics.  Advised PCP follow-up, return cautions judi, she verbalized understanding and is agreement with plan.    Medications   cephalexin (KEFLEX) capsule 500 mg (500 mg Oral Given 9/19/24 1528)         Past Medical and Surgical History    Past Medical History:   Diagnosis Date    Breast cancer (HCC) 2021    treated with XRT and lumpectomy right sided    Hyperlipidemia, unspecified     Hypertension     Rotator cuff injury     Right       Past Surgical History:   Procedure Laterality Date    BREAST LUMPECTOMY Right 2021    CATARACT EXTRACTION Bilateral     HYSTERECTOMY         Family History   Problem Relation Age of Onset    Heart disease Mother     Heart disease Father     Heart disease Sister        Social History     Tobacco Use    Smoking status: Never    Smokeless tobacco: Never   Vaping Use    Vaping status:  Never Used   Substance Use Topics    Alcohol use: Yes     Comment: occasionally    Drug use: Not Currently           Allergies    Allergies   Allergen Reactions    Brimonidine Other (See Comments)     Eye lashes fell off       Home Medications    Prior to Admission medications    Medication Sig Start Date End Date Taking? Authorizing Provider   anastrozole (ARIMIDEX) 1 mg tablet Take 1 mg by mouth daily 10/15/21   Historical Provider, MD   aspirin (ECOTRIN LOW STRENGTH) 81 mg EC tablet Take 81 mg by mouth    Historical Provider, MD   atorvastatin (LIPITOR) 20 mg tablet take 1 tablet by mouth every morning 4/22/24   Tong Claudio MD   Calcium-Phosphorus-Vitamin D (Citracal +D3) 250-107-500 MG-MG-UNIT CHEW Chew    Historical Provider, MD   dorzolamide-timolol (COSOPT) 22.3-6.8 MG/ML ophthalmic solution 1 drop 8 am and 8 pm both eyes 4/3/23   Historical Provider, MD   hydroCHLOROthiazide 12.5 mg tablet take 1 tablet by mouth every morning 4/30/24   Tong Claudio MD   Multiple Vitamin (multivitamin) capsule Take 1 capsule by mouth daily    Historical Provider, MD   ofloxacin (OCUFLOX) 0.3 % ophthalmic solution  2/7/24   Historical Provider, MD   ramipril (ALTACE) 10 MG capsule take 1 capsule by mouth every morning 4/5/24   Tong Claudio MD           Physical Exam      ED Triage Vitals [09/19/24 1238]   Temperature Pulse Respirations Blood Pressure SpO2   97.7 °F (36.5 °C) 65 19 158/71 99 %      Temp Source Heart Rate Source Patient Position - Orthostatic VS BP Location FiO2 (%)   Temporal Monitor Sitting Left arm --      Pain Score       --               Physical Exam  Constitutional:       General: She is not in acute distress.     Appearance: She is not ill-appearing.   HENT:      Head: Normocephalic and atraumatic.      Nose: Nose normal.      Mouth/Throat:      Mouth: Mucous membranes are moist.   Eyes:      Extraocular Movements: Extraocular movements intact.      Pupils: Pupils are equal, round, and reactive to  light.   Cardiovascular:      Rate and Rhythm: Normal rate and regular rhythm.      Comments: Left DP pulse intact  Pulmonary:      Effort: Pulmonary effort is normal. No respiratory distress.   Abdominal:      General: There is no distension.   Musculoskeletal:         General: Normal range of motion.      Cervical back: Normal range of motion and neck supple.      Comments: Left lower extremity swelling involving primary left lower leg distally and into the ankle, left lower leg is warm and erythematous, the posterior aspect of it is somewhat tender to palpation, no fluctuance or crepitus or induration.  No wounds noted.   Skin:     General: Skin is warm.      Findings: Erythema present.   Neurological:      Mental Status: She is alert and oriented to person, place, and time. Mental status is at baseline.      Sensory: No sensory deficit.      Motor: No weakness.              Diagnostic Results      Labs:    Results Reviewed       None            All labs reviewed and utilized in the medical decision making process    Radiology:    VAS VENOUS DUPLEX -LOWER LIMB UNILATERAL   Final Result      XR tibia fibula 2 views LEFT   Final Result      No acute osseous abnormality.            Computerized Assisted Algorithm (CAA) may have been used to analyze all applicable images.               Workstation performed: [id number]             All radiology studies independently viewed by me and interpreted by the radiologist.    Procedure    Procedures        FINAL IMPRESSION    Final diagnoses:   Cellulitis of left lower extremity         DISPOSITION    Time reflects when diagnosis was documented in both MDM as applicable and the Disposition within this note       Time User Action Codes Description Comment    9/19/2024  3:32 PM Yong Rouse Add [L03.116] Cellulitis of left lower extremity           ED Disposition       ED Disposition   Discharge    Condition   Stable    Date/Time   Thu Sep 19, 2024  3:32 PM    Comment   Lori  Rochelle discharge to home/self care.                   Follow-up Information       Follow up With Specialties Details Why Contact Info    Tong Claudio MD Family Medicine Call in 1 day  [address]  [facility] [id number] [phone]                PATIENT REFERRED TO:    Tong Claudio MD  [address]  [facility] [address]  [phone]    Call in 1 day        DISCHARGE MEDICATIONS:    Discharge Medication List as of 9/19/2024  3:33 PM        START taking these medications    Details   cephalexin (KEFLEX) 500 mg capsule Take 1 capsule (500 mg total) by mouth every 6 (six) hours for 7 days, Starting Thu 9/19/2024, Until Thu 9/26/2024, Normal           CONTINUE these medications which have NOT CHANGED    Details   anastrozole (ARIMIDEX) 1 mg tablet Take 1 mg by mouth daily, Starting Fri 10/15/2021, Historical Med      aspirin (ECOTRIN LOW STRENGTH) 81 mg EC tablet Take 81 mg by mouth, Historical Med      atorvastatin (LIPITOR) 20 mg tablet take 1 tablet by mouth every morning, Normal      Calcium-Phosphorus-Vitamin D (Citracal +D3) 250-107-500 MG-MG-UNIT CHEW Chew, Historical Med      dorzolamide-timolol (COSOPT) 22.3-6.8 MG/ML ophthalmic solution 1 drop 8 am and 8 pm both eyes, Historical Med      hydroCHLOROthiazide 12.5 mg tablet take 1 tablet by mouth every morning, Normal      Multiple Vitamin (multivitamin) capsule Take 1 capsule by mouth daily, Historical Med      ofloxacin (OCUFLOX) 0.3 % ophthalmic solution Historical Med      ramipril (ALTACE) 10 MG capsule take 1 capsule by mouth every morning, Starting Fri 4/5/2024, Normal             No discharge procedures on file.         Yong Rouse DO        This note was partially completed using voice recognition technology, and was scanned for gross errors; however some errors may still exist. Please contact the author with any questions or requests for clarification.      Yong Rouse DO  09/19/24 1766

## 2024-10-21 ENCOUNTER — APPOINTMENT (OUTPATIENT)
Dept: LAB | Facility: CLINIC | Age: 84
End: 2024-10-21
Payer: MEDICARE

## 2024-10-21 DIAGNOSIS — E55.9 VITAMIN D DEFICIENCY: ICD-10-CM

## 2024-10-21 DIAGNOSIS — R73.01 IMPAIRED FASTING GLUCOSE: ICD-10-CM

## 2024-10-21 DIAGNOSIS — E78.5 DYSLIPIDEMIA, GOAL LDL BELOW 130: ICD-10-CM

## 2024-10-21 DIAGNOSIS — D64.9 ANEMIA, UNSPECIFIED TYPE: ICD-10-CM

## 2024-10-21 DIAGNOSIS — Z13.1 SCREENING FOR DIABETES MELLITUS: ICD-10-CM

## 2024-10-21 DIAGNOSIS — R53.83 OTHER FATIGUE: ICD-10-CM

## 2024-10-21 LAB
25(OH)D3 SERPL-MCNC: 41.3 NG/ML (ref 30–100)
ALBUMIN SERPL BCG-MCNC: 3.9 G/DL (ref 3.5–5)
ALP SERPL-CCNC: 61 U/L (ref 34–104)
ALT SERPL W P-5'-P-CCNC: 12 U/L (ref 7–52)
ANION GAP SERPL CALCULATED.3IONS-SCNC: 8 MMOL/L (ref 4–13)
AST SERPL W P-5'-P-CCNC: 16 U/L (ref 13–39)
BASOPHILS # BLD AUTO: 0.08 THOUSANDS/ΜL (ref 0–0.1)
BASOPHILS NFR BLD AUTO: 1 % (ref 0–1)
BILIRUB SERPL-MCNC: 0.37 MG/DL (ref 0.2–1)
BUN SERPL-MCNC: 20 MG/DL (ref 5–25)
CALCIUM SERPL-MCNC: 9.6 MG/DL (ref 8.4–10.2)
CHLORIDE SERPL-SCNC: 103 MMOL/L (ref 96–108)
CHOLEST SERPL-MCNC: 147 MG/DL
CO2 SERPL-SCNC: 29 MMOL/L (ref 21–32)
CREAT SERPL-MCNC: 0.71 MG/DL (ref 0.6–1.3)
EOSINOPHIL # BLD AUTO: 0.38 THOUSAND/ΜL (ref 0–0.61)
EOSINOPHIL NFR BLD AUTO: 5 % (ref 0–6)
ERYTHROCYTE [DISTWIDTH] IN BLOOD BY AUTOMATED COUNT: 13.6 % (ref 11.6–15.1)
EST. AVERAGE GLUCOSE BLD GHB EST-MCNC: 131 MG/DL
GFR SERPL CREATININE-BSD FRML MDRD: 79 ML/MIN/1.73SQ M
GLUCOSE P FAST SERPL-MCNC: 108 MG/DL (ref 65–99)
HBA1C MFR BLD: 6.2 %
HCT VFR BLD AUTO: 36.2 % (ref 34.8–46.1)
HDLC SERPL-MCNC: 52 MG/DL
HGB BLD-MCNC: 11.2 G/DL (ref 11.5–15.4)
IMM GRANULOCYTES # BLD AUTO: 0.03 THOUSAND/UL (ref 0–0.2)
IMM GRANULOCYTES NFR BLD AUTO: 0 % (ref 0–2)
LDLC SERPL CALC-MCNC: 73 MG/DL (ref 0–100)
LYMPHOCYTES # BLD AUTO: 1.66 THOUSANDS/ΜL (ref 0.6–4.47)
LYMPHOCYTES NFR BLD AUTO: 22 % (ref 14–44)
MCH RBC QN AUTO: 28.5 PG (ref 26.8–34.3)
MCHC RBC AUTO-ENTMCNC: 30.9 G/DL (ref 31.4–37.4)
MCV RBC AUTO: 92 FL (ref 82–98)
MONOCYTES # BLD AUTO: 0.73 THOUSAND/ΜL (ref 0.17–1.22)
MONOCYTES NFR BLD AUTO: 10 % (ref 4–12)
NEUTROPHILS # BLD AUTO: 4.69 THOUSANDS/ΜL (ref 1.85–7.62)
NEUTS SEG NFR BLD AUTO: 62 % (ref 43–75)
NONHDLC SERPL-MCNC: 95 MG/DL
NRBC BLD AUTO-RTO: 0 /100 WBCS
PLATELET # BLD AUTO: 157 THOUSANDS/UL (ref 149–390)
PMV BLD AUTO: 11.5 FL (ref 8.9–12.7)
POTASSIUM SERPL-SCNC: 4.3 MMOL/L (ref 3.5–5.3)
PROT SERPL-MCNC: 6.9 G/DL (ref 6.4–8.4)
RBC # BLD AUTO: 3.93 MILLION/UL (ref 3.81–5.12)
SODIUM SERPL-SCNC: 140 MMOL/L (ref 135–147)
TRIGL SERPL-MCNC: 110 MG/DL
TSH SERPL DL<=0.05 MIU/L-ACNC: 1.44 UIU/ML (ref 0.45–4.5)
WBC # BLD AUTO: 7.57 THOUSAND/UL (ref 4.31–10.16)

## 2024-10-21 PROCEDURE — 36415 COLL VENOUS BLD VENIPUNCTURE: CPT

## 2024-10-21 PROCEDURE — 82306 VITAMIN D 25 HYDROXY: CPT

## 2024-10-21 PROCEDURE — 85025 COMPLETE CBC W/AUTO DIFF WBC: CPT

## 2024-10-21 PROCEDURE — 80053 COMPREHEN METABOLIC PANEL: CPT

## 2024-10-21 PROCEDURE — 80061 LIPID PANEL: CPT

## 2024-10-21 PROCEDURE — 83036 HEMOGLOBIN GLYCOSYLATED A1C: CPT

## 2024-10-21 PROCEDURE — 84443 ASSAY THYROID STIM HORMONE: CPT

## 2024-10-22 ENCOUNTER — TELEPHONE (OUTPATIENT)
Dept: FAMILY MEDICINE CLINIC | Facility: CLINIC | Age: 84
End: 2024-10-22

## 2024-10-22 NOTE — TELEPHONE ENCOUNTER
----- Message from Tong Claudio MD sent at 10/22/2024 11:08 AM EDT -----  Her blood work shows anemia recommend further blood work to evaluate also borderline elevated glucose pre-diabetes advise pt.

## 2024-10-25 NOTE — TELEPHONE ENCOUNTER
Please advise pt as soon as possible of the further work up that is suggested also she was wondering if the Dr has any suggestions or info to give on her Dexa scan

## 2024-10-26 DIAGNOSIS — D64.9 ANEMIA, UNSPECIFIED TYPE: Primary | ICD-10-CM

## 2024-10-26 DIAGNOSIS — D53.9 NUTRITIONAL ANEMIA, UNSPECIFIED: ICD-10-CM

## 2024-10-26 NOTE — TELEPHONE ENCOUNTER
Additional blood work for anemia ordered. DEXA scan shows low bone density osteopenia recommend daily Vitamin D at least 1000 units and calcium at least 1200 mg daily along with regular exercise.

## 2024-10-27 DIAGNOSIS — I10 ESSENTIAL HYPERTENSION: ICD-10-CM

## 2024-10-28 RX ORDER — HYDROCHLOROTHIAZIDE 12.5 MG/1
TABLET ORAL
Qty: 30 TABLET | Refills: 0 | Status: SHIPPED | OUTPATIENT
Start: 2024-10-28

## 2024-10-28 NOTE — TELEPHONE ENCOUNTER
Patient notified. This telephone encounter was printed and emailed to the patient at [email or web address] per her request.

## 2024-10-30 ENCOUNTER — LAB (OUTPATIENT)
Dept: LAB | Facility: CLINIC | Age: 84
End: 2024-10-30
Payer: MEDICARE

## 2024-10-30 DIAGNOSIS — D53.9 NUTRITIONAL ANEMIA, UNSPECIFIED: ICD-10-CM

## 2024-10-30 DIAGNOSIS — D64.9 ANEMIA, UNSPECIFIED TYPE: ICD-10-CM

## 2024-10-30 LAB
BACTERIA UR QL AUTO: ABNORMAL /HPF
BILIRUB UR QL STRIP: NEGATIVE
CLARITY UR: CLEAR
COLOR UR: YELLOW
FERRITIN SERPL-MCNC: 59 NG/ML (ref 11–307)
FOLATE SERPL-MCNC: >22.3 NG/ML
GLUCOSE UR STRIP-MCNC: NEGATIVE MG/DL
HGB UR QL STRIP.AUTO: NEGATIVE
HYALINE CASTS #/AREA URNS LPF: ABNORMAL /LPF
IRON SATN MFR SERPL: 18 % (ref 15–50)
IRON SERPL-MCNC: 60 UG/DL (ref 50–212)
KETONES UR STRIP-MCNC: NEGATIVE MG/DL
LEUKOCYTE ESTERASE UR QL STRIP: ABNORMAL
NITRITE UR QL STRIP: NEGATIVE
NON-SQ EPI CELLS URNS QL MICRO: ABNORMAL /HPF
PH UR STRIP.AUTO: 6 [PH]
PROT UR STRIP-MCNC: NEGATIVE MG/DL
RBC #/AREA URNS AUTO: ABNORMAL /HPF
SP GR UR STRIP.AUTO: 1.01 (ref 1–1.03)
TIBC SERPL-MCNC: 326 UG/DL (ref 250–450)
UIBC SERPL-MCNC: 266 UG/DL (ref 155–355)
UROBILINOGEN UR STRIP-ACNC: <2 MG/DL
VIT B12 SERPL-MCNC: 437 PG/ML (ref 180–914)
WBC #/AREA URNS AUTO: ABNORMAL /HPF

## 2024-10-30 PROCEDURE — 82728 ASSAY OF FERRITIN: CPT

## 2024-10-30 PROCEDURE — 36415 COLL VENOUS BLD VENIPUNCTURE: CPT

## 2024-10-30 PROCEDURE — 82607 VITAMIN B-12: CPT

## 2024-10-30 PROCEDURE — 82746 ASSAY OF FOLIC ACID SERUM: CPT

## 2024-10-30 PROCEDURE — 81001 URINALYSIS AUTO W/SCOPE: CPT

## 2024-10-30 PROCEDURE — 83550 IRON BINDING TEST: CPT

## 2024-10-30 PROCEDURE — 83540 ASSAY OF IRON: CPT

## 2024-11-01 ENCOUNTER — TELEPHONE (OUTPATIENT)
Dept: FAMILY MEDICINE CLINIC | Facility: CLINIC | Age: 84
End: 2024-11-01

## 2024-11-01 NOTE — TELEPHONE ENCOUNTER
Patient returned call. Read Dr. Claudio's message regarding lab work verbatim.    Please advise, thank you.

## 2024-11-01 NOTE — TELEPHONE ENCOUNTER
----- Message from Tong Claudio MD sent at 10/31/2024  8:06 PM EDT -----  Please advise patient that labs are normal.

## 2024-11-18 ENCOUNTER — APPOINTMENT (OUTPATIENT)
Dept: RADIOLOGY | Facility: CLINIC | Age: 84
End: 2024-11-18
Payer: MEDICARE

## 2024-11-18 ENCOUNTER — OFFICE VISIT (OUTPATIENT)
Dept: OBGYN CLINIC | Facility: CLINIC | Age: 84
End: 2024-11-18
Payer: MEDICARE

## 2024-11-18 ENCOUNTER — RA CDI HCC (OUTPATIENT)
Dept: OTHER | Facility: HOSPITAL | Age: 84
End: 2024-11-18

## 2024-11-18 VITALS
HEIGHT: 64 IN | BODY MASS INDEX: 28.34 KG/M2 | HEART RATE: 57 BPM | OXYGEN SATURATION: 98 % | WEIGHT: 166 LBS | RESPIRATION RATE: 18 BRPM | SYSTOLIC BLOOD PRESSURE: 139 MMHG | DIASTOLIC BLOOD PRESSURE: 84 MMHG | TEMPERATURE: 98.2 F

## 2024-11-18 DIAGNOSIS — M25.562 LEFT KNEE PAIN, UNSPECIFIED CHRONICITY: ICD-10-CM

## 2024-11-18 DIAGNOSIS — M17.12 PRIMARY OSTEOARTHRITIS OF LEFT KNEE: Primary | ICD-10-CM

## 2024-11-18 PROCEDURE — 73562 X-RAY EXAM OF KNEE 3: CPT

## 2024-11-18 PROCEDURE — 99204 OFFICE O/P NEW MOD 45 MIN: CPT | Performed by: FAMILY MEDICINE

## 2024-11-18 NOTE — PROGRESS NOTES
Subjective:    Chief Complaint   Patient presents with    Left Knee - Pain, Locking, Swelling       Lori Byrd is a 84 y.o. female complains of left knee pain. Onset of the symptoms was several months ago.  Mechanism of injury:  none . Aggravating factors: walking  and weight bearing. Treatment to date: none. Symptoms have progressed to a point and plateaued.      The following portions were reviewed and updated as needed: allergies, current medications, past medical history, past social history, past surgical history and problem list.    Review of Systems   Constitutional: Negative for fever.   HENT: Negative for dental problem and headaches.    Eyes: Negative for vision loss.   Respiratory: Negative for cough and shortness of breath.    Cardiovascular: Negative for leg swelling and palpitations.   Gastrointestinal: Negative for constipation and diarrhea.   Genitourinary: Negative for bladder incontinence and difficulty urinating.   Musculoskeletal: Negative for back pain and difficulty walking.   Skin: Negative for rash and ulcer.   Neurological: Negative for dizziness and headaches.   Hem/Lymph/Immuno: Negative for blood clots. Does not bruise/bleed easily.   Psychiatric/Behavioral: Negative for confusion.         Objective:  General: no acute distress, non toxic, AAO x3   Skin: no skin changes, no rashes, no wounds or laceration  Vasculature: normal cap refill, no LE edema, normal popliteal and dorsalis pedis pulse  Neurologic:   Musculoskeletal: left KNEE EXAM  Gait: limping gait negative, able to weight bear without difficulty  Inspection: No gross deformity, no redness or warmth   Effusion: negative   Medial joint line TTP: negative  Lateral joint line TTP: +  ROM: Full flexion and extension  [facility]'s: negative,   Instability to varus/valgus stress: negative  Anterior Drawer: negative   Lachman's test: negative  Posterior Drawer: negative  Crepitus: +          Imaging:       Assessment/Plan:  1.  Primary osteoarthritis of left knee (Primary)      > 45 min devoted to review of previous, pertinent medical records, imaging, discussion of treatment options, counseling and documentation  Imaging independently reviewed and discussed with patient.  Degenerative changes noted, no acute fractures appreciated.  Follow-up official reading.  We discussed the nature of knee OA at length and detailed the treatment approach.  Directed to ice the area daily for 20 minutes at a time using a barrier to protect the skin- stressed specifically icing after activity to address inflammation  Begin topical voltaren gel  Recommending formal PT- Rx provided for PT  We discussed a HEP and literature was provided for reference. It was explained that this does not supplement formal PT but should serve to bridge the gap, while they wait to get into PT. Advised to avoid any exercises which exacerbate their pain.  Follow up as needed . Should sx's worsen or any concerns arise, they were advised to follow up sooner or seek more immediate medical attention.  All of the patient's concerns were addressed and questions answered. They verbalized agreement with and understanding of the treatment plan.        - XR knee 3 vw left non injury; Future  - Ambulatory Referral to Physical Therapy; Future

## 2024-11-22 DIAGNOSIS — I10 ESSENTIAL HYPERTENSION: ICD-10-CM

## 2024-11-24 RX ORDER — HYDROCHLOROTHIAZIDE 12.5 MG/1
12.5 TABLET ORAL EVERY MORNING
Qty: 30 TABLET | Refills: 3 | Status: SHIPPED | OUTPATIENT
Start: 2024-11-24 | End: 2024-11-26 | Stop reason: SDUPTHER

## 2024-11-26 ENCOUNTER — EVALUATION (OUTPATIENT)
Dept: PHYSICAL THERAPY | Facility: CLINIC | Age: 84
End: 2024-11-26
Payer: MEDICARE

## 2024-11-26 DIAGNOSIS — I10 ESSENTIAL HYPERTENSION: ICD-10-CM

## 2024-11-26 DIAGNOSIS — M17.12 PRIMARY OSTEOARTHRITIS OF LEFT KNEE: ICD-10-CM

## 2024-11-26 DIAGNOSIS — M25.562 LEFT KNEE PAIN, UNSPECIFIED CHRONICITY: Primary | ICD-10-CM

## 2024-11-26 PROCEDURE — 97110 THERAPEUTIC EXERCISES: CPT

## 2024-11-26 PROCEDURE — 97161 PT EVAL LOW COMPLEX 20 MIN: CPT

## 2024-11-26 RX ORDER — HYDROCHLOROTHIAZIDE 12.5 MG/1
12.5 TABLET ORAL EVERY MORNING
Qty: 90 TABLET | Refills: 1 | Status: SHIPPED | OUTPATIENT
Start: 2024-11-26

## 2024-11-26 NOTE — PROGRESS NOTES
PT Evaluation     Today's date: 2024  Patient name: Lori Byrd  : 1940  MRN: [id number]  Referring provider: Peter Aceves DO  Dx:   Encounter Diagnosis     ICD-10-CM    1. Left knee pain, unspecified chronicity  M25.562       2. Primary osteoarthritis of left knee  M17.12 Ambulatory Referral to Physical Therapy          Start Time: 0810  Stop Time: 0900  Total time in clinic (min): 50 minutes    Assessment  Impairments: abnormal gait, abnormal or restricted ROM, activity intolerance, impaired physical strength, lacks appropriate home exercise program, pain with function, weight-bearing intolerance and activity limitations    Assessment details: Pt presents with left knee pain.  Onset approx 3-4 months ago when foot slipped out of peddle while riding bike.  Has had worsening symptoms.  Decreased strength and Rom of knee noted.  Increased swelling noted knee and lower leg.  Gait antalgic.  Genu Valgus deformity left knee.  Pain increases with twisting/pivoting LLE. X-ray revealed degenerative changes.  Will benefit from PT tx to decrease symptoms and restore normal functional level.       Prognosis: good    Goals  ST. Decrease pain 50% 6 wk  2.  Increase Knee ROM to 0-120 degrees 6 wk.  3.  Increase knee strength to 4/5 6 wk.  4.  Pt will demonstrate normal gait pattern on level surfaces 6 wk  5.  Pt will report no difficulty with light ADL's 6 wk  LT.  Pt will report no pain 12 wk  2.  Increase Knee ROM to WNL 12 wk  3. Increase knee strength to WNL 12 wk  4.  Pt will report no limitations with ambulation 12 wk  5.  Pt will report no limitations with ADL's 12 wk    Plan  Patient would benefit from: skilled physical therapy and PT eval  Planned modality interventions: cryotherapy and thermotherapy: hydrocollator packs    Planned therapy interventions: joint mobilization, manual therapy, neuromuscular re-education, balance, self care, strengthening, stretching, therapeutic activities,  therapeutic exercise, functional ROM exercises, flexibility and home exercise program    Frequency: 3x week  Duration in weeks: 12  Treatment plan discussed with: patient        Subjective Evaluation    History of Present Illness  Mechanism of injury: Pt presents with left knee pain.  Reports 3-4 months ago riding recumbent bike and foot slipped of peddle and struck lower leg and felt she pulled a muscle in back of leg.  Has had increasing pain.  Walking becoming difficult.  No instability noted in knee.  Painful clicking at times.  Swelling noted.  Increased difficulty moving sit to stand.  No issues with left knee prior.  Tried volteran cream and tylenol.  X-ray revealed arthritic changes.  Pain primarily posterior knee and lateral knee.    Patient Goals  Patient goals for therapy: decreased pain and increased strength  Patient goal: Get rid of the pain and go back to exercise  Pain  Current pain ratin  At best pain ratin  At worst pain rating: 10  Location: Left Knee  Quality: sharp  Relieving factors: ice, medications and rest  Exacerbated by: Twisting, Pivoting.  Progression: worsening    Exercise history: Normally walks 1-2 miles a few days a week.  Bikes daily prior          Objective     Observations   Left Knee   Positive for effusion.     Additional Observation Details  Genu Valgus deformity  Swelling noted lower leg with pitting edema left ankle region    Tenderness   Left Knee   Tenderness in the lateral joint line, LCL (distal), LCL (proximal) and popliteal fossa.     Active Range of Motion   Left Knee   Flexion: 104 degrees   Extension: 5 degrees   Extensor la degrees     Mobility   Patellar Mobility:   Left Knee   WFL: medial, lateral, superior and inferior.     Strength/Myotome Testing     Left Knee   Flexion: 3+  Extension: 3-  Quadriceps contraction: fair    Right Knee   Flexion: 4-  Extension: 4-    Tests     Left Knee   Positive lateral Mani.     Swelling     Left Knee Girth  Measurement (cm)   Joint line: 46 cm    Right Knee Girth Measurement (cm)   Joint line: 42.5 cm    Ambulation     Ambulation: Stairs   Ascend stairs: independent  Pattern: reciprocal  Railings: two rails  Descend stairs: independent  Pattern: reciprocal  Railings: two rails    Additional Stairs Ambulation Details  Has to perform in non-reciprocal manner without handrail     Observational Gait   Gait: antalgic   Decreased walking speed, stride length and left stance time.     Additional Observational Gait Details  Valgus deformity noted left knee            Precautions:  Hx Breast Caner- In Remission       Manuals 11-26-24       Gentle PROM/ Stretch LLE                                Neuro Re-Ed         Quad set        Add sq        Side stepping at rail        Tandem stance                                Ther Ex        Nustep L2  12'       TR/HR        Mini squat        LAQ        T-band HS curl        SAQ        SLR        Supine clam shell                                        HEP Instructed and issued HEP ([id number])       Ther Activity                        Gait Training                        Modalities        CP

## 2024-11-29 ENCOUNTER — OFFICE VISIT (OUTPATIENT)
Dept: FAMILY MEDICINE CLINIC | Facility: CLINIC | Age: 84
End: 2024-11-29
Payer: MEDICARE

## 2024-11-29 ENCOUNTER — OFFICE VISIT (OUTPATIENT)
Dept: PHYSICAL THERAPY | Facility: CLINIC | Age: 84
End: 2024-11-29
Payer: MEDICARE

## 2024-11-29 VITALS
HEIGHT: 64 IN | TEMPERATURE: 96.6 F | HEART RATE: 61 BPM | OXYGEN SATURATION: 100 % | WEIGHT: 169 LBS | DIASTOLIC BLOOD PRESSURE: 80 MMHG | SYSTOLIC BLOOD PRESSURE: 130 MMHG | BODY MASS INDEX: 28.85 KG/M2

## 2024-11-29 DIAGNOSIS — M25.562 LEFT KNEE PAIN, UNSPECIFIED CHRONICITY: ICD-10-CM

## 2024-11-29 DIAGNOSIS — Z17.0 MALIGNANT NEOPLASM OF UPPER-OUTER QUADRANT OF RIGHT BREAST IN FEMALE, ESTROGEN RECEPTOR POSITIVE (HCC): Primary | ICD-10-CM

## 2024-11-29 DIAGNOSIS — R00.1 BRADYCARDIA: ICD-10-CM

## 2024-11-29 DIAGNOSIS — M17.12 PRIMARY OSTEOARTHRITIS OF LEFT KNEE: Primary | ICD-10-CM

## 2024-11-29 DIAGNOSIS — Z00.00 MEDICARE ANNUAL WELLNESS VISIT, SUBSEQUENT: ICD-10-CM

## 2024-11-29 DIAGNOSIS — C50.411 MALIGNANT NEOPLASM OF UPPER-OUTER QUADRANT OF RIGHT BREAST IN FEMALE, ESTROGEN RECEPTOR POSITIVE (HCC): Primary | ICD-10-CM

## 2024-11-29 PROBLEM — D69.6 PLATELETS DECREASED (HCC): Status: RESOLVED | Noted: 2022-05-11 | Resolved: 2024-11-29

## 2024-11-29 PROCEDURE — 97110 THERAPEUTIC EXERCISES: CPT

## 2024-11-29 PROCEDURE — G0439 PPPS, SUBSEQ VISIT: HCPCS | Performed by: FAMILY MEDICINE

## 2024-11-29 PROCEDURE — 99213 OFFICE O/P EST LOW 20 MIN: CPT | Performed by: FAMILY MEDICINE

## 2024-11-29 PROCEDURE — 97112 NEUROMUSCULAR REEDUCATION: CPT

## 2024-11-29 NOTE — PROGRESS NOTES
Name: Lori Byrd      : 1940      MRN: [id number]  Encounter Provider: Tong Claudio MD  Encounter Date: 2024   Encounter department: [facility]    Assessment & Plan  Malignant neoplasm of upper-outer quadrant of right breast in female, estrogen receptor positive (HCC)  In remission  Continue surveillance       Bradycardia  Denies any symptoms related to this  Continue to monitor symptoms       Medicare annual wellness visit, subsequent  See Medicare wellness note    Follow up in 1 year          Preventive health issues were discussed with patient, and age appropriate screening tests were ordered as noted in patient's After Visit Summary. Personalized health advice and appropriate referrals for health education or preventive services given if needed, as noted in patient's After Visit Summary.    History of Present Illness     Patient is here for a follow up. Has a Hx of right breast cancer in remission. Gets mammography done. Had one done back in 2024 and normal.  Also has bradycardia denies any fatigue or SOB on exertion symptoms.       Patient Care Team:  Tong Claudio MD as PCP - General (Family Medicine)    Review of Systems   Constitutional:  Negative for activity change, appetite change, fatigue and fever.   HENT:  Negative for congestion and ear discharge.    Respiratory:  Negative for cough and shortness of breath.    Cardiovascular:  Negative for chest pain and palpitations.   Gastrointestinal:  Negative for diarrhea and nausea.   Musculoskeletal:  Negative for arthralgias and back pain.   Skin:  Negative for color change and rash.   Neurological:  Negative for dizziness and headaches.   Psychiatric/Behavioral:  Negative for agitation and behavioral problems.      Medical History Reviewed by provider this encounter:  Tobacco  Allergies  Meds  Problems  Med Hx  Surg Hx  Fam Hx       Annual Wellness Visit Questionnaire   Lori is here for her Subsequent  Wellness visit.     Health Risk Assessment:   Patient rates overall health as good. Patient feels that their physical health rating is same. Patient is very satisfied with their life. Eyesight was rated as same. Hearing was rated as same. Patient feels that their emotional and mental health rating is same. Patients states they are never, rarely angry. Patient states they are sometimes unusually tired/fatigued. Pain experienced in the last 7 days has been some. Patient's pain rating has been 3/10. Patient states that she has experienced no weight loss or gain in last 6 months. Arthritis in the knee    Depression Screening:   PHQ-2 Score: 0      Fall Risk Screening:   In the past year, patient has experienced: no history of falling in past year      Urinary Incontinence Screening:   Patient has not leaked urine accidently in the last six months.     Home Safety:  Patient has trouble with stairs inside or outside of their home. Patient has working smoke alarms and has working carbon monoxide detector. Home safety hazards include: none.     Nutrition:   Current diet is Regular.     Medications:   Patient is not currently taking any over-the-counter supplements. Patient is able to manage medications.     Activities of Daily Living (ADLs)/Instrumental Activities of Daily Living (IADLs):   Walk and transfer into and out of bed and chair?: Yes  Dress and groom yourself?: Yes    Bathe or shower yourself?: Yes    Feed yourself? Yes  Do your laundry/housekeeping?: Yes  Manage your money, pay your bills and track your expenses?: Yes  Make your own meals?: Yes    Do your own shopping?: Yes    Previous Hospitalizations:   Any hospitalizations or ED visits within the last 12 months?: No      PREVENTIVE SCREENINGS      Cardiovascular Screening:    General: History Lipid Disorder and Screening Current      Diabetes Screening:     General: Screening Current      Colorectal Cancer Screening:     General: Screening Not Indicated       "Breast Cancer Screening:     General: History Breast Cancer and Screening Current      Cervical Cancer Screening:    General: Screening Not Indicated      Osteoporosis Screening:    General: Screening Current      Abdominal Aortic Aneurysm (AAA) Screening:        General: Screening Not Indicated      Lung Cancer Screening:     General: Screening Not Indicated      Hepatitis C Screening:    General: Screening Not Indicated    Screening, Brief Intervention, and Referral to Treatment (SBIRT)    Screening  Typical number of drinks in a day: 0    Single Item Drug Screening:  How often have you used an illegal drug (including marijuana) or a prescription medication for non-medical reasons in the past year? never    Single Item Drug Screen Score: 0  Interpretation: Negative screen for possible drug use disorder    Social Drivers of Health     Financial Resource Strain: Low Risk  (11/21/2023)    Overall Financial Resource Strain (CARDIA)     Difficulty of Paying Living Expenses: Not very hard   Food Insecurity: No Food Insecurity (11/29/2024)    Hunger Vital Sign     Worried About Running Out of Food in the Last Year: Never true     Ran Out of Food in the Last Year: Never true   Transportation Needs: No Transportation Needs (11/29/2024)    PRAPARE - Transportation     Lack of Transportation (Medical): No     Lack of Transportation (Non-Medical): No   Housing Stability: Low Risk  (11/29/2024)    Housing Stability Vital Sign     Unable to Pay for Housing in the Last Year: No     Number of Times Moved in the Last Year: 1     Homeless in the Last Year: No   Utilities: Not At Risk (11/29/2024)    [facility] Utilities     Threatened with loss of utilities: No     No results found.    Objective   /80 (BP Location: Left arm, Patient Position: Sitting, Cuff Size: Large)   Pulse 61   Temp (!) 96.6 °F (35.9 °C)   Ht 5' 4\" (1.626 m)   Wt 76.7 kg (169 lb)   SpO2 100%   BMI 29.01 kg/m²     Physical Exam  Vitals and nursing note " reviewed.   Constitutional:       General: She is not in acute distress.     Appearance: She is well-developed.   HENT:      Head: Normocephalic and atraumatic.   Eyes:      Conjunctiva/sclera: Conjunctivae normal.   Cardiovascular:      Rate and Rhythm: Normal rate and regular rhythm.      Heart sounds: No murmur heard.  Pulmonary:      Effort: Pulmonary effort is normal. No respiratory distress.      Breath sounds: Normal breath sounds.   Abdominal:      Palpations: Abdomen is soft.      Tenderness: There is no abdominal tenderness.   Musculoskeletal:         General: No swelling.      Cervical back: Neck supple.   Skin:     General: Skin is warm and dry.      Capillary Refill: Capillary refill takes less than 2 seconds.   Neurological:      Mental Status: She is alert.   Psychiatric:         Mood and Affect: Mood normal.

## 2024-11-29 NOTE — PROGRESS NOTES
"Daily Note     Today's date: 2024  Patient name: Lori Byrd  : 1940  MRN: [id number]  Referring provider: Peter Aceves DO  Dx:   Encounter Diagnosis     ICD-10-CM    1. Primary osteoarthritis of left knee  M17.12       2. Left knee pain, unspecified chronicity  M25.562                      Subjective:  The knee is feeling better today      Objective: See treatment diary below      Assessment: Tolerated treatment well.  Weakness/pain noted with knee extension.  Reports symptoms primarily lateral knee with activity.  Patient would benefit from continued PT      Plan: Continue per plan of care.       Precautions:  Hx Breast Caner- In Remission       Manuals 24      Gentle PROM/ Stretch LLE  7'                              Neuro Re-Ed         Quad set  20x  5\"      Add sq  20x  5\"      Side stepping at rail  Green band 8x R/L at rail      Tandem stance                                Ther Ex        Nustep L2  12' L3  15'      TR/HR  2x10      Mini squat  2x10      LAQ  1#  2x10      T-band HS curl  Green 2x10      SAQ  2x10      SLR        Supine clam shell                                        HEP Instructed and issued HEP ([id number])       Ther Activity                        Gait Training                        Modalities        CP                    "

## 2024-11-29 NOTE — PATIENT INSTRUCTIONS
Medicare Preventive Visit Patient Instructions  Thank you for completing your Welcome to Medicare Visit or Medicare Annual Wellness Visit today. Your next wellness visit will be due in one year (11/30/2025).  The screening/preventive services that you may require over the next 5-10 years are detailed below. Some tests may not apply to you based off risk factors and/or age. Screening tests ordered at today's visit but not completed yet may show as past due. Also, please note that scanned in results may not display below.  Preventive Screenings:  Service Recommendations Previous Testing/Comments   Colorectal Cancer Screening  * Colonoscopy    * Fecal Occult Blood Test (FOBT)/Fecal Immunochemical Test (FIT)  * Fecal DNA/Cologuard Test  * Flexible Sigmoidoscopy Age: 45-75 years old   Colonoscopy: every 10 years (may be performed more frequently if at higher risk)  OR  FOBT/FIT: every 1 year  OR  Cologuard: every 3 years  OR  Sigmoidoscopy: every 5 years  Screening may be recommended earlier than age 45 if at higher risk for colorectal cancer. Also, an individualized decision between you and your healthcare provider will decide whether screening between the ages of 76-85 would be appropriate. Colonoscopy: Not on file  FOBT/FIT: Not on file  Cologuard: Not on file  Sigmoidoscopy: Not on file    Screening Not Indicated     Breast Cancer Screening Age: 40+ years old  Frequency: every 1-2 years  Not required if history of left and right mastectomy Mammogram: 06/17/2024    History Breast Cancer   Cervical Cancer Screening Between the ages of 21-29, pap smear recommended once every 3 years.   Between the ages of 30-65, can perform pap smear with HPV co-testing every 5 years.   Recommendations may differ for women with a history of total hysterectomy, cervical cancer, or abnormal pap smears in past. Pap Smear: Not on file    Screening Not Indicated   Hepatitis C Screening Once for adults born between 1945 and 1965  More  frequently in patients at high risk for Hepatitis C Hep C Antibody: Not on file    Screening Not Indicated   Diabetes Screening 1-2 times per year if you're at risk for diabetes or have pre-diabetes Fasting glucose: 108 mg/dL (10/21/2024)  A1C: 6.2 % (10/21/2024)  Screening Current   Cholesterol Screening Once every 5 years if you don't have a lipid disorder. May order more often based on risk factors. Lipid panel: 10/21/2024    Screening Not Indicated  History Lipid Disorder     Other Preventive Screenings Covered by Medicare:  Abdominal Aortic Aneurysm (AAA) Screening: covered once if your at risk. You're considered to be at risk if you have a family history of AAA.  Lung Cancer Screening: covers low dose CT scan once per year if you meet all of the following conditions: (1) Age 55-77; (2) No signs or symptoms of lung cancer; (3) Current smoker or have quit smoking within the last 15 years; (4) You have a tobacco smoking history of at least 20 pack years (packs per day multiplied by number of years you smoked); (5) You get a written order from a healthcare provider.  Glaucoma Screening: covered annually if you're considered high risk: (1) You have diabetes OR (2) Family history of glaucoma OR (3)  aged 50 and older OR (4)  American aged 65 and older  Osteoporosis Screening: covered every 2 years if you meet one of the following conditions: (1) You're estrogen deficient and at risk for osteoporosis based off medical history and other findings; (2) Have a vertebral abnormality; (3) On glucocorticoid therapy for more than 3 months; (4) Have primary hyperparathyroidism; (5) On osteoporosis medications and need to assess response to drug therapy.   Last bone density test (DXA Scan): 10/12/2022.  HIV Screening: covered annually if you're between the age of 15-65. Also covered annually if you are younger than 15 and older than 65 with risk factors for HIV infection. For pregnant patients, it is  covered up to 3 times per pregnancy.    Immunizations:  Immunization Recommendations   Influenza Vaccine Annual influenza vaccination during flu season is recommended for all persons aged >= 6 months who do not have contraindications   Pneumococcal Vaccine   * Pneumococcal conjugate vaccine = PCV13 (Prevnar 13), PCV15 (Vaxneuvance), PCV20 (Prevnar 20)  * Pneumococcal polysaccharide vaccine = PPSV23 (Pneumovax) Adults 19-65 yo with certain risk factors or if 65+ yo  If never received any pneumonia vaccine: recommend Prevnar 20 (PCV20)  Give PCV20 if previously received 1 dose of PCV13 or PPSV23   Hepatitis B Vaccine 3 dose series if at intermediate or high risk (ex: diabetes, end stage renal disease, liver disease)   Respiratory syncytial virus (RSV) Vaccine - COVERED BY MEDICARE PART D  * RSVPreF3 (Arexvy) CDC recommends that adults 60 years of age and older may receive a single dose of RSV vaccine using shared clinical decision-making (SCDM)   Tetanus (Td) Vaccine - COST NOT COVERED BY MEDICARE PART B Following completion of primary series, a booster dose should be given every 10 years to maintain immunity against tetanus. Td may also be given as tetanus wound prophylaxis.   Tdap Vaccine - COST NOT COVERED BY MEDICARE PART B Recommended at least once for all adults. For pregnant patients, recommended with each pregnancy.   Shingles Vaccine (Shingrix) - COST NOT COVERED BY MEDICARE PART B  2 shot series recommended in those 19 years and older who have or will have weakened immune systems or those 50 years and older     Health Maintenance Due:      Topic Date Due   • DXA SCAN  10/12/2024     Immunizations Due:      Topic Date Due   • Influenza Vaccine (1) 09/01/2024   • COVID-19 Vaccine (8 - 2024-25 season) 09/01/2024     Advance Directives   What are advance directives?  Advance directives are legal documents that state your wishes and plans for medical care. These plans are made ahead of time in case you lose your  ability to make decisions for yourself. Advance directives can apply to any medical decision, such as the treatments you want, and if you want to donate organs.   What are the types of advance directives?  There are many types of advance directives, and each state has rules about how to use them. You may choose a combination of any of the following:  Living will:  This is a written record of the treatment you want. You can also choose which treatments you do not want, which to limit, and which to stop at a certain time. This includes surgery, medicine, IV fluid, and tube feedings.   Durable power of  for healthcare (DPAHC):  This is a written record that states who you want to make healthcare choices for you when you are unable to make them for yourself. This person, called a proxy, is usually a family member or a friend. You may choose more than 1 proxy.  Do not resuscitate (DNR) order:  A DNR order is used in case your heart stops beating or you stop breathing. It is a request not to have certain forms of treatment, such as CPR. A DNR order may be included in other types of advance directives.  Medical directive:  This covers the care that you want if you are in a coma, near death, or unable to make decisions for yourself. You can list the treatments you want for each condition. Treatment may include pain medicine, surgery, blood transfusions, dialysis, IV or tube feedings, and a ventilator (breathing machine).  Values history:  This document has questions about your views, beliefs, and how you feel and think about life. This information can help others choose the care that you would choose.  Why are advance directives important?  An advance directive helps you control your care. Although spoken wishes may be used, it is better to have your wishes written down. Spoken wishes can be misunderstood, or not followed. Treatments may be given even if you do not want them. An advance directive may make it easier  for your family to make difficult choices about your care.   Weight Management   Why it is important to manage your weight:  Being overweight increases your risk of health conditions such as heart disease, high blood pressure, type 2 diabetes, and certain types of cancer. It can also increase your risk for osteoarthritis, sleep apnea, and other respiratory problems. Aim for a slow, steady weight loss. Even a small amount of weight loss can lower your risk of health problems.  How to lose weight safely:  A safe and healthy way to lose weight is to eat fewer calories and get regular exercise. You can lose up about 1 pound a week by decreasing the number of calories you eat by 500 calories each day.   Healthy meal plan for weight management:  A healthy meal plan includes a variety of foods, contains fewer calories, and helps you stay healthy. A healthy meal plan includes the following:  Eat whole-grain foods more often.  A healthy meal plan should contain fiber. Fiber is the part of grains, fruits, and vegetables that is not broken down by your body. Whole-grain foods are healthy and provide extra fiber in your diet. Some examples of whole-grain foods are whole-wheat breads and pastas, oatmeal, brown rice, and bulgur.  Eat a variety of vegetables every day.  Include dark, leafy greens such as spinach, kale, bronson greens, and mustard greens. Eat yellow and orange vegetables such as carrots, sweet potatoes, and winter squash.   Eat a variety of fruits every day.  Choose fresh or canned fruit (canned in its own juice or light syrup) instead of juice. Fruit juice has very little or no fiber.  Eat low-fat dairy foods.  Drink fat-free (skim) milk or 1% milk. Eat fat-free yogurt and low-fat cottage cheese. Try low-fat cheeses such as mozzarella and other reduced-fat cheeses.  Choose meat and other protein foods that are low in fat.  Choose beans or other legumes such as split peas or lentils. Choose fish, skinless poultry  (chicken or turkey), or lean cuts of red meat (beef or pork). Before you cook meat or poultry, cut off any visible fat.   Use less fat and oil.  Try baking foods instead of frying them. Add less fat, such as margarine, sour cream, regular salad dressing and mayonnaise to foods. Eat fewer high-fat foods. Some examples of high-fat foods include french fries, doughnuts, ice cream, and cakes.  Eat fewer sweets.  Limit foods and drinks that are high in sugar. This includes candy, cookies, regular soda, and sweetened drinks.  Exercise:  Exercise at least 30 minutes per day on most days of the week. Some examples of exercise include walking, biking, dancing, and swimming. You can also fit in more physical activity by taking the stairs instead of the elevator or parking farther away from stores. Ask your healthcare provider about the best exercise plan for you.      © Copyright [employer] 2018 Information is for End User's use only and may not be sold, redistributed or otherwise used for commercial purposes. All illustrations and images included in CareNotes® are the copyrighted property of [employer] or [employer]

## 2024-12-03 ENCOUNTER — OFFICE VISIT (OUTPATIENT)
Dept: PHYSICAL THERAPY | Facility: CLINIC | Age: 84
End: 2024-12-03
Payer: MEDICARE

## 2024-12-03 DIAGNOSIS — M25.562 LEFT KNEE PAIN, UNSPECIFIED CHRONICITY: ICD-10-CM

## 2024-12-03 DIAGNOSIS — M17.12 PRIMARY OSTEOARTHRITIS OF LEFT KNEE: Primary | ICD-10-CM

## 2024-12-03 PROCEDURE — 97110 THERAPEUTIC EXERCISES: CPT

## 2024-12-03 PROCEDURE — 97112 NEUROMUSCULAR REEDUCATION: CPT

## 2024-12-03 PROCEDURE — 97140 MANUAL THERAPY 1/> REGIONS: CPT

## 2024-12-03 NOTE — PROGRESS NOTES
"Daily Note     Today's date: 12/3/2024  Patient name: Lori Byrd  : 1940  MRN: [id number]  Referring provider: Peter Aceves DO  Dx:   Encounter Diagnosis     ICD-10-CM    1. Primary osteoarthritis of left knee  M17.12       2. Left knee pain, unspecified chronicity  M25.562                      Subjective:   I feel like its something other than arthritis going on in the knee      Objective: See treatment diary below      Assessment: Tolerated treatment well.  Continues to note pain primarily lateral aspect of knee.  Valgus deformity noted.  Weakness noted in LLE.  Patient would benefit from continued PT      Plan: Continue per plan of care.       Precautions:  Hx Breast Caner- In Remission       Manuals 11-26-24 11-29-24 12-3-24     Gentle PROM/ Stretch LLE  7' 10'                             Neuro Re-Ed         Quad set  20x  5\" 20x  5\"     Add sq  20x  5\" 20x  5\"     Side stepping at rail  Green band 8x R/L at rail Green band 10x R/L at rail     Tandem stance                                Ther Ex        Nustep L2  12' L3  15' L6  15'     TR/HR  2x10 2x10     Mini squat  2x10 2x10     LAQ  1#  2x10 1.5#  3x10     T-band HS curl  Green 2x10 Green 2x15      SAQ  2x10 3x10     SLR        Supine clam shell                                        HEP Instructed and issued HEP ([id number])       Ther Activity                        Gait Training                        Modalities        CP   Home                  "

## 2024-12-05 ENCOUNTER — OFFICE VISIT (OUTPATIENT)
Dept: PHYSICAL THERAPY | Facility: CLINIC | Age: 84
End: 2024-12-05
Payer: MEDICARE

## 2024-12-05 DIAGNOSIS — M17.12 PRIMARY OSTEOARTHRITIS OF LEFT KNEE: Primary | ICD-10-CM

## 2024-12-05 DIAGNOSIS — M25.562 LEFT KNEE PAIN, UNSPECIFIED CHRONICITY: ICD-10-CM

## 2024-12-05 PROCEDURE — 97110 THERAPEUTIC EXERCISES: CPT

## 2024-12-05 NOTE — PROGRESS NOTES
"Daily Note     Today's date: 2024  Patient name: Lori Byrd  : 1940  MRN: [id number]  Referring provider: Peter Aceves DO  Dx:   Encounter Diagnosis     ICD-10-CM    1. Primary osteoarthritis of left knee  M17.12       2. Left knee pain, unspecified chronicity  M25.562                      Subjective:  The knee gets sore about a half hour after I leave here.  If I put some pressure on the back of the knee it feels better      Objective: See treatment diary below      Assessment: Tolerated treatment fair.  Held manual therapy as pt feels this aggravates knee.  Discussed use of neoprene sleeve as she reports pressure/support helps symptoms.  Pt agreeable with this and will obtain brace/sleeve.   Patient would benefit from continued PT      Plan: Continue per plan of care.       Precautions:  Hx Breast Caner- In Remission       Manuals 11-26-24 11-29-24 12-3-24 12-5-24    Gentle PROM/ Stretch LLE  7' 10'                             Neuro Re-Ed         Quad set  20x  5\" 20x  5\" 20x  5\"    Add sq  20x  5\" 20x  5\" 20x  5\"    Side stepping at rail  Green band 8x R/L at rail Green band 10x R/L at rail Green band 10x R/L at rail    Tandem stance                                Ther Ex        Nustep L2  12' L3  15' L6  15' L4  15'    TR/HR  2x10 2x10 20x    Mini squat  2x10 2x10 2x10    LAQ  1#  2x10 1.5#  3x10 1#  2x10    T-band HS curl  Green 2x10 Green 2x15  Red 2x10    SAQ  2x10 3x10 2x10    SLR        St HS Curl    20x    Supine clam shell    Green 2x10    St hip 3 way    March, abd, ext 1#  20x ea                            HEP Instructed and issued HEP ([id number])       Ther Activity                        Gait Training                        Modalities        CP   Home                  "
No

## 2024-12-09 ENCOUNTER — TELEPHONE (OUTPATIENT)
Age: 84
End: 2024-12-09

## 2024-12-09 DIAGNOSIS — U07.1 COVID-19 VIRUS INFECTION: Primary | ICD-10-CM

## 2024-12-09 NOTE — TELEPHONE ENCOUNTER
If she has shortness of breath recommend going to the ER  Paxlovid sent to her pharmacy, recommend to hold statin while taking Paxlovid  Recommend to keep well hydrated, tylenol and ibuprofen for fever and body aches  Also recommend Vitamin D, Vitamin C and Zinc

## 2024-12-09 NOTE — TELEPHONE ENCOUNTER
Pt reports she has headache and body aches, dry cough, yesterday. Pt denies chest pain, SOB and has no other symptoms at this time. Pt reports she had the Covid shot on Thursday and now today is positive for Covid. Pt wanted to see if PCP advises pt to have paxlovid or what provider feels pt should do at this time.  PCP please further advise pt.

## 2024-12-29 PROBLEM — Z00.00 MEDICARE ANNUAL WELLNESS VISIT, SUBSEQUENT: Status: RESOLVED | Noted: 2024-11-29 | Resolved: 2024-12-29

## 2025-01-13 ENCOUNTER — EVALUATION (OUTPATIENT)
Dept: PHYSICAL THERAPY | Facility: CLINIC | Age: 85
End: 2025-01-13
Payer: MEDICARE

## 2025-01-13 DIAGNOSIS — M25.562 LEFT KNEE PAIN, UNSPECIFIED CHRONICITY: ICD-10-CM

## 2025-01-13 DIAGNOSIS — M17.12 PRIMARY OSTEOARTHRITIS OF LEFT KNEE: Primary | ICD-10-CM

## 2025-01-13 PROCEDURE — 97164 PT RE-EVAL EST PLAN CARE: CPT | Performed by: PHYSICAL THERAPIST

## 2025-01-13 PROCEDURE — 97110 THERAPEUTIC EXERCISES: CPT | Performed by: PHYSICAL THERAPIST

## 2025-01-13 PROCEDURE — 97140 MANUAL THERAPY 1/> REGIONS: CPT | Performed by: PHYSICAL THERAPIST

## 2025-01-13 NOTE — PROGRESS NOTES
PT Evaluation     Today's date: 2025  Patient name: Lori Byrd  : 1940  MRN: [id number]  Referring provider: Peter Aceves DO  Dx:   Encounter Diagnosis     ICD-10-CM    1. Primary osteoarthritis of left knee  M17.12       2. Left knee pain, unspecified chronicity  M25.562                        Assessment  Impairments: abnormal gait, abnormal or restricted ROM, activity intolerance, impaired physical strength, lacks appropriate home exercise program, pain with function and activity limitations    Assessment details: Pt presents with left knee pain.  Onset approx 3-4 months ago when foot slipped out of peddle while riding bike.  Has had worsening symptoms.  Decreased strength and Rom of knee noted.  Increased swelling noted knee and lower leg.  Gait antalgic.  Genu Valgus deformity left knee.  Pain increases with twisting/pivoting LLE. X-ray revealed degenerative changes.  Will benefit from PT tx to decrease symptoms and restore normal functional level.      25:  Pt showing improvement in pain but still showing signs of weakness and some balance issues.     Prognosis: good    Goals  ST. Decrease pain 50% 6 wk -EXCELLENT PROGRESS   2.  Increase Knee ROM to 0-120 degrees 6 wk.  -EXCELLENT PROGRESS  3.  Increase knee strength to 4/5 6 wk.  -SOME PROGRESS   4.  Pt will demonstrate normal gait pattern on level surfaces 6 wk  -GOOD PROGRESS  5.  Pt will report no difficulty with light ADL's 6 wk-GOOD PROGRESS  LT.  Pt will report no pain 12 wk-SOME PROGRESS  2.  Increase Knee ROM to WNL 12 wk  -GOOD PROGRESS  3. Increase knee strength to WNL 12 wk  MINIMAL PROGRESS  4.  Pt will report no limitations with ambulation 12 wk  -GOOD PROGRESS  5.  Pt will report no limitations with ADL's 12 wk -GOOD PROGRESS    Plan  Patient would benefit from: skilled physical therapy and PT eval  Planned modality interventions: cryotherapy and thermotherapy: hydrocollator packs    Planned therapy  interventions: joint mobilization, manual therapy, neuromuscular re-education, balance, self care, strengthening, stretching, therapeutic activities, therapeutic exercise, functional ROM exercises, flexibility and home exercise program    Frequency: 3x week  Duration in weeks: 10  Treatment plan discussed with: patient        Subjective Evaluation    History of Present Illness  Mechanism of injury: Pt presents with left knee pain.  Reports 3-4 months ago riding recumbent bike and foot slipped of peddle and struck lower leg and felt she pulled a muscle in back of leg.  Has had increasing pain.  Walking becoming difficult.  No instability noted in knee.  Painful clicking at times.  Swelling noted.  Increased difficulty moving sit to stand.  No issues with left knee prior.  Tried volteran cream and tylenol.  X-ray revealed arthritic changes.  Pain primarily posterior knee and lateral knee.      25:  Pt returns to PT after being away for a month.  Pt reports she does notice her knee is getting better and has less swelling.  She states that putting pressure/ compression on the area helps with pain.  Patient Goals  Patient goals for therapy: decreased pain and increased strength  Patient goal: Get rid of the pain and go back to exercise  Pain  Current pain rating: 3  At best pain ratin  At worst pain ratin  Location: Left Knee  Relieving factors: ice and rest  Exacerbated by: Twisting, Pivoting.  Progression: improved    Exercise history: Normally walks 1-2 miles a few days a week.  Bikes daily prior          Objective     Observations     Additional Observation Details  Genu Valgus deformity  Swelling noted lower leg with pitting edema left ankle region    25:  No pitting edema present in left ankle    Tenderness   Left Knee   Tenderness in the lateral joint line and LCL (proximal). No tenderness in the popliteal fossa.     Active Range of Motion   Left Knee   Flexion: 112 degrees   Extension: 2 degrees  "  Extensor la degrees     Mobility   Patellar Mobility:   Left Knee   WFL: medial, lateral, superior and inferior.     Strength/Myotome Testing     Left Knee   Flexion: 3+  Extension: 3  Quadriceps contraction: fair    Right Knee   Flexion: 4-  Extension: 4-    Tests     Left Knee   Positive lateral Mani.     Swelling     Left Knee Girth Measurement (cm)   Joint line: 43 cm    Right Knee Girth Measurement (cm)   Joint line: 42.5 cm    Ambulation     Ambulation: Stairs   Ascend stairs: independent  Pattern: reciprocal  Railings: two rails  Descend stairs: independent  Pattern: reciprocal  Railings: two rails    Additional Stairs Ambulation Details  Has to perform in non-reciprocal manner without handrail     Observational Gait   Gait: antalgic   Decreased walking speed, stride length and left stance time.     Additional Observational Gait Details  Valgus deformity noted left knee     Functional Assessment        Single Leg Stance - Eyes Open   Left  Trial 1: 2 seconds  Trial 2: 2 seconds  Trial 3: 2 seconds  Average: 2 seconds            Precautions:  Hx Breast Caner- In Remission       Manuals 11-26-24 11-29-24 12-3-24 12-5-24 1-13-25   Gentle PROM/ Stretch LLE  7' 10'  10'                           Neuro Re-Ed         Quad set  20x  5\" 20x  5\" 20x  5\" 20x 5\"   Add sq  20x  5\" 20x  5\" 20x  5\" 20x 5\"   Side stepping at rail  Green band 8x R/L at rail Green band 10x R/L at rail Green band 10x R/L at rail ==>   Tandem stance                                Ther Ex        Nustep L2  12' L3  15' L6  15' L4  15' L5 15'   TR/HR  2x10 2x10 20x ==>   Mini squat  2x10 2x10 2x10 2x10   LAQ  1#  2x10 1.5#  3x10 1#  2x10 2# 2x10   T-band HS curl  Green 2x10 Green 2x15  Red 2x10 Green 2x10   SAQ  2x10 3x10 2x10 2# 2x10   SLR        St HS Curl    20x 20x   Supine clam shell    Green 2x10    St hip 3 way    March, abd, ext 1#  20x ea 1# 20x each                           HEP Instructed and issued HEP ([id number])       Ther " Activity                        Gait Training                        Modalities        CP   Home                     no known allergies

## 2025-01-16 ENCOUNTER — OFFICE VISIT (OUTPATIENT)
Dept: PHYSICAL THERAPY | Facility: CLINIC | Age: 85
End: 2025-01-16
Payer: MEDICARE

## 2025-01-16 DIAGNOSIS — M25.562 LEFT KNEE PAIN, UNSPECIFIED CHRONICITY: ICD-10-CM

## 2025-01-16 DIAGNOSIS — M17.12 PRIMARY OSTEOARTHRITIS OF LEFT KNEE: Primary | ICD-10-CM

## 2025-01-16 PROCEDURE — 97110 THERAPEUTIC EXERCISES: CPT

## 2025-01-16 PROCEDURE — 97140 MANUAL THERAPY 1/> REGIONS: CPT

## 2025-01-16 NOTE — PROGRESS NOTES
"Daily Note     Today's date: 2025  Patient name: Lori Byrd  : 1940  MRN: [id number]  Referring provider: Peter Aceves DO  Dx:   Encounter Diagnosis     ICD-10-CM    1. Primary osteoarthritis of left knee  M17.12       2. Left knee pain, unspecified chronicity  M25.562                      Subjective:  The knee is getting better.        Objective: See treatment diary below      Assessment: Tolerated treatment well.   Reports decreased overall pain and improved mobility.  Continued pain lateral knee reported.  Reports catching toes with gait (chronic).  Discussed performing seated dorsiflexion and standing marches to help with clearing LE's.  Patient would benefit from continued PT      Plan: Continue per plan of care.       Precautions:  Hx Breast Caner- In Remission       Manuals 1-16-25 11-29-24 12-3-24 12-5-24 1-13-25   Gentle PROM/ Stretch LLE 10' 7' 10'  10'                           Neuro Re-Ed         Quad set 20x  5\" 20x  5\" 20x  5\" 20x  5\" 20x 5\"   Add sq 20x  5\" 20x  5\" 20x  5\" 20x  5\" 20x 5\"   Side stepping at rail  Green band 8x R/L at rail Green band 10x R/L at rail Green band 10x R/L at rail ==>   Tandem stance                                Ther Ex        Nustep L5  12' L3  15' L6  15' L4  15' L5 15'   TR/HR 2x10 2x10 2x10 20x ==>   Mini squat 2x10 2x10 2x10 2x10 2x10   LAQ 1#  2x10 1#  2x10 1.5#  3x10 1#  2x10 2# 2x10   T-band HS curl  Green 2x10 Green 2x15  Red 2x10 Green 2x10   SAQ 1#  2x10 2x10 3x10 2x10 2# 2x10   SLR        St HS Curl 2x10   20x 20x   Supine clam shell    Green 2x10    St hip 3 way 1#  2x10 ea BLE   March, abd, ext 1#  20x ea 1# 20x each                           HEP        Ther Activity                        Gait Training                        Modalities        CP   Home                                     "

## 2025-01-21 ENCOUNTER — OFFICE VISIT (OUTPATIENT)
Dept: PHYSICAL THERAPY | Facility: CLINIC | Age: 85
End: 2025-01-21
Payer: MEDICARE

## 2025-01-21 DIAGNOSIS — M17.12 PRIMARY OSTEOARTHRITIS OF LEFT KNEE: Primary | ICD-10-CM

## 2025-01-21 DIAGNOSIS — M25.562 LEFT KNEE PAIN, UNSPECIFIED CHRONICITY: ICD-10-CM

## 2025-01-21 PROCEDURE — 97110 THERAPEUTIC EXERCISES: CPT

## 2025-01-21 PROCEDURE — 97140 MANUAL THERAPY 1/> REGIONS: CPT

## 2025-01-21 NOTE — PROGRESS NOTES
"Daily Note     Today's date: 2025  Patient name: Lori Byrd  : 1940  MRN: [id number]  Referring provider: Peter Aceves DO  Dx:   Encounter Diagnosis     ICD-10-CM    1. Primary osteoarthritis of left knee  M17.12       2. Left knee pain, unspecified chronicity  M25.562                      Subjective: Lori reports if she didn't sit down her L knee would be good.       Objective: See treatment diary below      Assessment: Decreased resistance on nustep due to discomfort with improvement in pain following. Visual and VC to ensure correct exercise technique. Cues for posterior shift of pelvis during mini squats helped to decrease knee discomfort. Some pain with LAQ but pt was able to tolerate as long as L knee stayed in a straight smooth pursuit. Pt would continue to benefit from skilled PT.       Plan: Continue with current POC to address pt deficits.        Precautions:  Hx Breast Cancer- In Remission       Manuals 1-16-25 1-21-25 12-3-24 12-5-24 1-13-25   Gentle PROM/ Stretch LLE 10' 10' 10'  10'                           Neuro Re-Ed         Quad set 20x  5\" 20x 5\"  20x  5\" 20x  5\" 20x 5\"   Add sq 20x  5\" 20x 5\"  20x  5\" 20x  5\" 20x 5\"   Side stepping at rail   Green band 10x R/L at rail Green band 10x R/L at rail ==>   Tandem stance                                Ther Ex        Nustep L5  12' Lv4 12' L6  15' L4  15' L5 15'   TR/HR 2x10 2x10 ea 2x10 20x ==>   Mini squat 2x10 2x10 2x10 2x10 2x10   LAQ 1#  2x10 1# 2x10 1.5#  3x10 1#  2x10 2# 2x10   T-band HS curl   Green 2x15  Red 2x10 Green 2x10   SAQ 1#  2x10 2x10 0#  3x10 2x10 2# 2x10   SLR        St HS Curl 2x10 2x10  20x 20x   Supine clam shell    Green 2x10    St hip 3 way 1#  2x10 ea BLE 1#  2x10 ea BLE  March, abd, ext 1#  20x ea 1# 20x each                           HEP        Ther Activity                        Gait Training                        Modalities        CP   Home                      "

## 2025-01-24 ENCOUNTER — OFFICE VISIT (OUTPATIENT)
Dept: PHYSICAL THERAPY | Facility: CLINIC | Age: 85
End: 2025-01-24
Payer: MEDICARE

## 2025-01-24 DIAGNOSIS — M17.12 PRIMARY OSTEOARTHRITIS OF LEFT KNEE: Primary | ICD-10-CM

## 2025-01-24 DIAGNOSIS — M25.562 LEFT KNEE PAIN, UNSPECIFIED CHRONICITY: ICD-10-CM

## 2025-01-24 PROCEDURE — 97110 THERAPEUTIC EXERCISES: CPT | Performed by: PHYSICAL THERAPIST

## 2025-01-24 PROCEDURE — 97140 MANUAL THERAPY 1/> REGIONS: CPT | Performed by: PHYSICAL THERAPIST

## 2025-01-24 NOTE — PROGRESS NOTES
"Daily Note     Today's date: 2025  Patient name: Lori Byrd  : 1940  MRN: [id number]  Referring provider: Peter Aceves DO  Dx:   Encounter Diagnosis     ICD-10-CM    1. Primary osteoarthritis of left knee  M17.12       2. Left knee pain, unspecified chronicity  M25.562                      Subjective: Pt reports knee continues to feel much better      Objective: See treatment diary below      Assessment: Tolerated treatment well. Patient improving in strength and ease of movement.      Plan: Continue per plan of care.  Progress treatment as tolerated.       Precautions:  Hx Breast Cancer- In Remission       Manuals 25   Gentle PROM/ Stretch LLE 10' 10' 10'  10'                           Neuro Re-Ed         Quad set 20x  5\" 20x 5\"  20x  5\" 20x  5\" 20x 5\"   Add sq 20x  5\" 20x 5\"  20x  5\" 20x  5\" 20x 5\"   Side stepping at rail   Green band 10x R/L at rail Green band 10x R/L at rail ==>   Tandem stance                                Ther Ex        Nustep L5  12' Lv4 12' L6  15' L4  15' L5 15'   TR/HR 2x10 2x10 ea 2x10 20x ==>   Mini squat 2x10 2x10 2x10 2x10 2x10   LAQ 1#  2x10 1# 2x10 1.5#  3x10 1#  2x10 2# 2x10   T-band HS curl   Green 2x15  Red 2x10 Green 2x10   SAQ 1#  2x10 2x10 0#  2x10 1# 2x10 2# 2x10   SLR        St HS Curl 2x10 2x10 1# 2x10 20x 20x   Supine clam shell    Green 2x10    St hip 3 way 1#  2x10 ea BLE 1#  2x10 ea BLE 1# 2x10 each March, abd, ext 1#  20x ea 1# 20x each                           HEP        Ther Activity                        Gait Training                        Modalities        CP   Home                           "

## 2025-01-27 ENCOUNTER — OFFICE VISIT (OUTPATIENT)
Dept: PHYSICAL THERAPY | Facility: CLINIC | Age: 85
End: 2025-01-27
Payer: MEDICARE

## 2025-01-27 DIAGNOSIS — M17.12 PRIMARY OSTEOARTHRITIS OF LEFT KNEE: Primary | ICD-10-CM

## 2025-01-27 DIAGNOSIS — M25.562 LEFT KNEE PAIN, UNSPECIFIED CHRONICITY: ICD-10-CM

## 2025-01-27 PROCEDURE — 97110 THERAPEUTIC EXERCISES: CPT

## 2025-01-27 PROCEDURE — 97140 MANUAL THERAPY 1/> REGIONS: CPT

## 2025-01-27 NOTE — PROGRESS NOTES
"Daily Note     Today's date: 2025  Patient name: Lori Byrd  : 1940  MRN: [id number]  Referring provider: Peter Aceves DO  Dx:   Encounter Diagnosis     ICD-10-CM    1. Primary osteoarthritis of left knee  M17.12       2. Left knee pain, unspecified chronicity  M25.562                      Subjective:  The knee is ok      Objective: See treatment diary below      Assessment: Tolerated treatment well.   Reports minimal symptoms with LAQ left knee.  Improved overall mobility noted.  Patient would benefit from continued PT      Plan: Continue per plan of care.      Precautions:  Hx Breast Cancer- In Remission       Manuals 25   Gentle PROM/ Stretch LLE 10' 10' 10' 10' 10'                           Neuro Re-Ed         Quad set 20x  5\" 20x 5\"  20x  5\" 20x  5\" 20x 5\"   Add sq 20x  5\" 20x 5\"  20x  5\" 20x  5\" 20x 5\"   Side stepping at rail   Green band 10x R/L at rail Green band 10x R/L at rail    Tandem stance                                Ther Ex        Nustep L5  12' Lv4 12' L6  15' L4  12' L5 15'   TR/HR 2x10 2x10 ea 2x10 20x ==>   Mini squat 2x10 2x10 2x10 2x10 2x10   LAQ 1#  2x10 1# 2x10 1.5#  3x10 1.5#  2x10 2# 2x10   T-band HS curl   Green 2x15  Green 2x10 Green 2x10   SAQ 1#  2x10 2x10 0#  2x10 1#  2# 2x10   SLR        St HS Curl 2x10 2x10 1# 2x10  20x   Supine clam shell    Green 2x10    St hip 3 way 1#  2x10 ea BLE 1#  2x10 ea BLE 1# 2x10 each March, abd, ext 1.5#  20x ea 1# 20x each                           HEP        Ther Activity                        Gait Training                        Modalities        CP   Home                             "

## 2025-01-28 ENCOUNTER — APPOINTMENT (OUTPATIENT)
Dept: PHYSICAL THERAPY | Facility: CLINIC | Age: 85
End: 2025-01-28
Payer: MEDICARE

## 2025-01-30 ENCOUNTER — OFFICE VISIT (OUTPATIENT)
Dept: PHYSICAL THERAPY | Facility: CLINIC | Age: 85
End: 2025-01-30
Payer: MEDICARE

## 2025-01-30 DIAGNOSIS — M17.12 PRIMARY OSTEOARTHRITIS OF LEFT KNEE: Primary | ICD-10-CM

## 2025-01-30 DIAGNOSIS — M25.562 LEFT KNEE PAIN, UNSPECIFIED CHRONICITY: ICD-10-CM

## 2025-01-30 PROCEDURE — 97110 THERAPEUTIC EXERCISES: CPT

## 2025-01-30 PROCEDURE — 97140 MANUAL THERAPY 1/> REGIONS: CPT

## 2025-01-31 ENCOUNTER — APPOINTMENT (OUTPATIENT)
Dept: PHYSICAL THERAPY | Facility: CLINIC | Age: 85
End: 2025-01-31
Payer: MEDICARE

## 2025-02-04 ENCOUNTER — OFFICE VISIT (OUTPATIENT)
Dept: PHYSICAL THERAPY | Facility: CLINIC | Age: 85
End: 2025-02-04
Payer: MEDICARE

## 2025-02-04 DIAGNOSIS — M25.562 LEFT KNEE PAIN, UNSPECIFIED CHRONICITY: ICD-10-CM

## 2025-02-04 DIAGNOSIS — M17.12 PRIMARY OSTEOARTHRITIS OF LEFT KNEE: Primary | ICD-10-CM

## 2025-02-04 PROCEDURE — 97110 THERAPEUTIC EXERCISES: CPT

## 2025-02-04 PROCEDURE — 97140 MANUAL THERAPY 1/> REGIONS: CPT

## 2025-02-04 NOTE — PROGRESS NOTES
"Daily Note     Today's date: 2025  Patient name: Lori Byrd  : 1940  MRN: [id number]  Referring provider: Peter Aceves DO  Dx:   Encounter Diagnosis     ICD-10-CM    1. Primary osteoarthritis of left knee  M17.12       2. Left knee pain, unspecified chronicity  M25.562                      Subjective:   The knee is is good and bad at times.        Objective: See treatment diary below      Assessment: Tolerated treatment well.  Reports knee better with movement.  Increased pain after longer periods of sitting.  Discussed brace for left knee.  Patient would benefit from continued PT      Plan: Continue per plan of care.      Precautions:  Hx Breast Cancer- In Remission       Manuals 25   Gentle PROM/ Stretch LLE 10' 10' 10' 10' 10'                           Neuro Re-Ed         Quad set 20x  5\" 20x 5\"  20x  5\" 20x  5\" 20x 5\"   Add sq 20x  5\" 20x 5\"  20x  5\" 20x  5\" 20x 5\"   Side stepping at rail Green band 10x R/L at rail  Green band 10x R/L at rail Green band 10x R/L at rail Green band 10x R/L at rail   Tandem stance                                Ther Ex        Nustep L5  12' Lv4 12' L6  15' L4  12' L5 12'   TR/HR 2x10 2x10 ea 2x10 20x 20x   Mini squat 2x10 2x10 2x10 2x10 2x10   LAQ 1.5#  2x10 1# 2x10 1.5#  3x10 1.5#  2x10 1.5# 2x10   T-band HS curl Green 2x10  Green 2x15  Green 2x10 Green 2x10   SAQ  2x10 0#  2x10 1#  2x10   SLR 10x    1x10, 1x5   St HS Curl  2x10 1# 2x10     Supine clam shell    Green 2x10    St hip 3 way 1.5#  2x10 ea BLE 1#  2x10 ea BLE 1# 2x10 each March, abd, ext 1.5#  20x ea March, Abd, Ext   1.5# 20x each                           HEP        Ther Activity                        Gait Training                        Modalities        CP   Home                                 "

## 2025-02-07 ENCOUNTER — OFFICE VISIT (OUTPATIENT)
Dept: PHYSICAL THERAPY | Facility: CLINIC | Age: 85
End: 2025-02-07
Payer: MEDICARE

## 2025-02-07 DIAGNOSIS — M25.562 LEFT KNEE PAIN, UNSPECIFIED CHRONICITY: ICD-10-CM

## 2025-02-07 DIAGNOSIS — M17.12 PRIMARY OSTEOARTHRITIS OF LEFT KNEE: Primary | ICD-10-CM

## 2025-02-07 PROCEDURE — 97110 THERAPEUTIC EXERCISES: CPT

## 2025-02-07 PROCEDURE — 97140 MANUAL THERAPY 1/> REGIONS: CPT

## 2025-02-07 NOTE — PROGRESS NOTES
"Daily Note     Today's date: 2025  Patient name: Lori Byrd  : 1940  MRN: [id number]  Referring provider: Peter Aceves DO  Dx:   Encounter Diagnosis     ICD-10-CM    1. Primary osteoarthritis of left knee  M17.12       2. Left knee pain, unspecified chronicity  M25.562                      Subjective:  The back of the knee hurts after stretching but its more mobile also      Objective: See treatment diary below      Assessment: Tolerated treatment well.   Reports will obtain brace soon for left knee.  Feels soreness posterior knee following stretching but feels better mobility.  Discomfort with mini squat today so reps decreased.  Patient would benefit from continued PT      Plan: Continue per plan of care.      Precautions:  Hx Breast Cancer- In Remission       Manuals 25   Gentle PROM/ Stretch LLE 10' 8' 10' 10' 10'                           Neuro Re-Ed         Quad set 20x  5\" 20x 5\"  20x  5\" 20x  5\" 20x 5\"   Add sq 20x  5\" 20x 5\"  20x  5\" 20x  5\" 20x 5\"   Side stepping at rail Green band 10x R/L at rail Green band 10x R/L at rail Green band 10x R/L at rail Green band 10x R/L at rail Green band 10x R/L at rail   Tandem stance                                Ther Ex        Nustep L5  12' L5 12' L6  15' L4  12' L5 12'   TR/HR 2x10 2x10 ea 2x10 20x 20x   Mini squat 2x10 1x10 2x10 2x10 2x10   LAQ 1.5#  2x10 1.5# 2x10 1.5#  3x10 1.5#  2x10 1.5# 2x10   T-band HS curl Green 2x10 Gree 2x10 Green 2x15  Green 2x10 Green 2x10   SAQ  2x10 0#  2x10 1#  2x10   SLR 10x 10x   1x10, 1x5   St HS Curl   1# 2x10     Supine clam shell  Green 2x10  Green 2x10    St hip 3 way 1.5#  2x10 ea BLE 1.5#  2x10 ea BLE 1# 2x10 each March, abd, ext 1.5#  20x ea March, Abd, Ext   1.5# 20x each                           HEP        Ther Activity                        Gait Training                        Modalities        CP  MHP 10'  left knee Home                                   "

## 2025-02-11 ENCOUNTER — OFFICE VISIT (OUTPATIENT)
Dept: PHYSICAL THERAPY | Facility: CLINIC | Age: 85
End: 2025-02-11
Payer: MEDICARE

## 2025-02-11 DIAGNOSIS — M25.562 LEFT KNEE PAIN, UNSPECIFIED CHRONICITY: ICD-10-CM

## 2025-02-11 DIAGNOSIS — M17.12 PRIMARY OSTEOARTHRITIS OF LEFT KNEE: Primary | ICD-10-CM

## 2025-02-11 PROCEDURE — 97112 NEUROMUSCULAR REEDUCATION: CPT

## 2025-02-11 PROCEDURE — 97110 THERAPEUTIC EXERCISES: CPT

## 2025-02-11 NOTE — PROGRESS NOTES
"Daily Note     Today's date: 2025  Patient name: Lori Byrd  : 1940  MRN: [id number]  Referring provider: Peter Aceves DO  Dx:   Encounter Diagnosis     ICD-10-CM    1. Primary osteoarthritis of left knee  M17.12       2. Left knee pain, unspecified chronicity  M25.562                      Subjective:   I'm doing ok.        Objective: See treatment diary below      Assessment: Tolerated treatment well.   Reports discomfort posterior knee.  Reports she is unsure if manual stretch causing this.  Instructed in self stretches for hams/gastroc and updated HEP.  Did obtain new knee brace and beginning use.  Patient would benefit from continued PT      Plan: Continue per plan of care.      Precautions:  Hx Breast Cancer- In Remission       Manuals 2-4-25 2-7-25 2-10-25 1-27-24 1-30-25   Gentle PROM/ Stretch LLE 10' 8' held 10' 10'                           Neuro Re-Ed         Quad set 20x  5\" 20x 5\"  20x  5\" 20x  5\" 20x 5\"   Add sq 20x  5\" 20x 5\"  20x  5\" 20x  5\" 20x 5\"   Side stepping at rail Green band 10x R/L at rail Green band 10x R/L at rail Green band 10x R/L at rail Green band 10x R/L at rail Green band 10x R/L at rail   Tandem stance                                Ther Ex        Nustep L5  12' L5 12' L5  12' L4  12' L5 12'   TR/HR 2x10 2x10 ea 2x10 20x 20x   Mini squat 2x10 1x10 2x10 2x10 2x10   LAQ 1.5#  2x10 1.5# 2x10 1.5#  2x10 1.5#  2x10 1.5# 2x10   T-band HS curl Green 2x10 Gree 2x10 Green 2x10 Green 2x10 Green 2x10   SAQ  2x10 0#  2x10 1.5#  2x10   SLR 10x 10x 2x10  1x10, 1x5   St HS Curl        Supine clam shell  Green 2x10 Green 2x10 Green 2x10    St hip 3 way 1.5#  2x10 ea BLE 1.5#  2x10 ea BLE 2x10 each March, abd, ext 1.5#  20x ea March, Abd, Ext   1.5# 20x each                           HEP   Instructed and issued HEP  ([id number])     Ther Activity                        Gait Training                        Modalities        CP  MHP 10'  left knee                                     "

## 2025-02-13 ENCOUNTER — APPOINTMENT (OUTPATIENT)
Dept: PHYSICAL THERAPY | Facility: CLINIC | Age: 85
End: 2025-02-13
Payer: MEDICARE

## 2025-02-14 ENCOUNTER — OFFICE VISIT (OUTPATIENT)
Dept: PHYSICAL THERAPY | Facility: CLINIC | Age: 85
End: 2025-02-14
Payer: MEDICARE

## 2025-02-14 DIAGNOSIS — M17.12 PRIMARY OSTEOARTHRITIS OF LEFT KNEE: Primary | ICD-10-CM

## 2025-02-14 DIAGNOSIS — M25.562 LEFT KNEE PAIN, UNSPECIFIED CHRONICITY: ICD-10-CM

## 2025-02-14 PROCEDURE — 97110 THERAPEUTIC EXERCISES: CPT

## 2025-02-14 PROCEDURE — 97112 NEUROMUSCULAR REEDUCATION: CPT

## 2025-02-14 NOTE — PROGRESS NOTES
PT RE-EVALUATION     Today's date: 2025  Patient name: Lori Byrd  : 1940  MRN: [id number]  Referring provider: Peter Aceves DO  Dx:   Encounter Diagnosis     ICD-10-CM    1. Primary osteoarthritis of left knee  M17.12       2. Left knee pain, unspecified chronicity  M25.562                        Assessment  Impairments: abnormal gait, abnormal or restricted ROM, activity intolerance, impaired physical strength, lacks appropriate home exercise program, pain with function and activity limitations    Assessment details: Pt presents with left knee pain.  Onset approx 3-4 months ago when foot slipped out of peddle while riding bike.  Has had worsening symptoms.  Decreased strength and Rom of knee noted.  Increased swelling noted knee and lower leg.  Gait antalgic.  Genu Valgus deformity left knee.  Pain increases with twisting/pivoting LLE. X-ray revealed degenerative changes.  Will benefit from PT tx to decrease symptoms and restore normal functional level.      25:  Pt showing improvement in pain but still showing signs of weakness and some balance issues.    25:  Pt reports continued varied pain left knee.  Symptoms primarily lateral aspect.  Limited gait/ADL's due to symptoms.  Non-specific movement triggers pain.  Vagus deformity noted.  Recently obtained neoprene brace which helps swelling and helps with stability.  Improved strength however continued deficits remain.  Crepitus noted left knee with movement. Reports Ortho MD follow up next month.  Will benefit from continued PT tx to decrease symptoms and improve functional level.       Prognosis: good    Goals  ST. Decrease pain 50% 6 wk    2.  Increase Knee ROM to 0-120 degrees 6 wk.   3.  Increase knee strength to 4/5 6 wk.   4.  Pt will demonstrate normal gait pattern on level surfaces 6 wk   5.  Pt will report no difficulty with light ADL's 6 wk  (partially met)  LT.  Pt will report no pain 12 wk  2.  Increase Knee  ROM to WNL 12 wk   3. Increase knee strength to WNL 12 wk   4.  Pt will report no limitations with ambulation 12 wk   5.  Pt will report no limitations with ADL's 12 wk (partially met/not met)    Plan  Patient would benefit from: skilled physical therapy and PT eval  Planned modality interventions: cryotherapy and thermotherapy: hydrocollator packs    Planned therapy interventions: joint mobilization, manual therapy, neuromuscular re-education, balance, self care, strengthening, stretching, therapeutic activities, therapeutic exercise, functional ROM exercises, flexibility and home exercise program    Frequency: 3x week  Duration in weeks: 12  Treatment plan discussed with: patient      Subjective Evaluation    History of Present Illness  Mechanism of injury: Pt presents with left knee pain.  Reports 3-4 months ago riding recumbent bike and foot slipped of peddle and struck lower leg and felt she pulled a muscle in back of leg.  Has had increasing pain.  Walking becoming difficult.  No instability noted in knee.  Painful clicking at times.  Swelling noted.  Increased difficulty moving sit to stand.  No issues with left knee prior.  Tried volteran cream and tylenol.  X-ray revealed arthritic changes.  Pain primarily posterior knee and lateral knee.      25:  Pt returns to PT after being away for a month.  Pt reports she does notice her knee is getting better and has less swelling.  She states that putting pressure/ compression on the area helps with pain.  Patient Goals  Patient goals for therapy: decreased pain and increased strength  Patient goal: Get rid of the pain and go back to exercise  Pain  Current pain rating: 3  At best pain rating: 3  At worst pain ratin  Location: Left Knee  Quality: sharp  Relieving factors: ice and rest  Exacerbated by: moving sit to stand after prolonged sitting, certain movements (twisting)  Progression: improved    Exercise history: Normally walks 1-2 miles a few days a  "week.  Bikes daily prior        Objective     Observations     Additional Observation Details  Genu Valgus deformity      Tenderness   Left Knee   Tenderness in the lateral joint line, LCL (distal), LCL (proximal) and tibial tubercle.     Active Range of Motion   Left Knee   Flexion: 112 degrees   Extension: 2 degrees   Extensor la degrees     Mobility   Patellar Mobility:   Left Knee   WFL: medial, lateral, superior and inferior.     Strength/Myotome Testing     Left Knee   Flexion: 4-  Extension: 4-    Right Knee   Flexion: 4-  Extension: 4-    Ambulation     Ambulation: Stairs   Ascend stairs: independent  Pattern: reciprocal  Railings: two rails  Descend stairs: independent  Pattern: reciprocal  Railings: two rails    Additional Stairs Ambulation Details  Has to perform in non-reciprocal manner without handrail     Observational Gait   Gait: antalgic   Decreased walking speed, stride length and left stance time.     Additional Observational Gait Details  Valgus deformity noted left knee         Precautions:  Hx Breast Cancer- In Remission       Manuals 2-4-25 2-7-25 2-10-25 2-14-25 1-30-25   Gentle PROM/ Stretch LLE 10' 8' held  10'                           Neuro Re-Ed         Quad set 20x  5\" 20x 5\"  20x  5\" 20x  5\" 20x 5\"   Add sq 20x  5\" 20x 5\"  20x  5\" 20x  5\" 20x 5\"   Side stepping at rail Green band 10x R/L at rail Green band 10x R/L at rail Green band 10x R/L at rail Blue band 10x R/L at rail Green band 10x R/L at rail   Tandem stance                                Ther Ex        Nustep L5  12' L5 12' L5  12' L5  12' L5 12'   TR/HR 2x10 2x10 ea 2x10 20x 20x   Mini squat 2x10 1x10 2x10 2x10 2x10   LAQ 1.5#  2x10 1.5# 2x10 1.5#  2x10 1.5#  2x15 1.5# 2x10   T-band HS curl Green 2x10 Gree 2x10 Green 2x10 Green 2x15 Green 2x10   SAQ  2x10 0#  2x10 1.5# 2x10 2x10   SLR 10x 10x 2x10 15x 1x10, 1x5   St HS Curl        Supine clam shell  Green 2x10 Green 2x10 Green 2x10    St hip 3 way 1.5#  2x10 ea BLE 1.5#  " 2x10 ea BLE 2x10 each March, abd, ext 1.5#  20x ea March, Abd, Ext   1.5# 20x each                           HEP   Instructed and issued HEP  ([id number])     Ther Activity                        Gait Training                        Modalities        CP  MHP 10'  left knee  MHP 10'  left knee

## 2025-02-18 ENCOUNTER — OFFICE VISIT (OUTPATIENT)
Dept: PHYSICAL THERAPY | Facility: CLINIC | Age: 85
End: 2025-02-18
Payer: MEDICARE

## 2025-02-18 DIAGNOSIS — M17.12 PRIMARY OSTEOARTHRITIS OF LEFT KNEE: Primary | ICD-10-CM

## 2025-02-18 DIAGNOSIS — M25.562 LEFT KNEE PAIN, UNSPECIFIED CHRONICITY: ICD-10-CM

## 2025-02-18 PROCEDURE — 97110 THERAPEUTIC EXERCISES: CPT

## 2025-02-18 PROCEDURE — 97112 NEUROMUSCULAR REEDUCATION: CPT

## 2025-02-21 ENCOUNTER — OFFICE VISIT (OUTPATIENT)
Dept: PHYSICAL THERAPY | Facility: CLINIC | Age: 85
End: 2025-02-21
Payer: MEDICARE

## 2025-02-21 DIAGNOSIS — M25.562 LEFT KNEE PAIN, UNSPECIFIED CHRONICITY: ICD-10-CM

## 2025-02-21 DIAGNOSIS — M17.12 PRIMARY OSTEOARTHRITIS OF LEFT KNEE: Primary | ICD-10-CM

## 2025-02-21 PROCEDURE — 97112 NEUROMUSCULAR REEDUCATION: CPT

## 2025-02-21 PROCEDURE — 97110 THERAPEUTIC EXERCISES: CPT

## 2025-02-21 NOTE — PROGRESS NOTES
"Daily Note     Today's date: 2025  Patient name: Lori Byrd  : 1940  MRN: [id number]  Referring provider: Peter Aceves DO  Dx:   Encounter Diagnosis     ICD-10-CM    1. Primary osteoarthritis of left knee  M17.12       2. Left knee pain, unspecified chronicity  M25.562                      Subjective: Lori reports not wearing the knee sleeve. She reports some more discomfort in L knee today.       Objective: See treatment diary below      Assessment: Visual and VC to ensure correct exercise technique. Increased discomfort with SLR which improved with lag. Cues for posterior shift of pelvis during mini squats with good follow through. Pt would continue to benefit from skilled PT. Progress as able.       Plan: Continue with current POC to address pt deficits.      Precautions:  Hx Breast Cancer- In Remission       Manuals 2-21-25 2-7-25 2-10-25 2-14-25 2-18-25   Gentle PROM/ Stretch LLE  8' held                             Neuro Re-Ed         Quad set 20x 5\"  20x 5\"  20x  5\" 20x  5\" 20x5\"   Add sq 20x 5\"  20x 5\"  20x  5\" 20x  5\"    Side stepping at rail 1.5# x10 R/L at rail  Green band 10x R/L at rail Green band 10x R/L at rail Blue band 10x R/L at rail Blue band 10x R/L at rail   Tandem stance                                Ther Ex        Nustep L5 12' L5 12' L5  12' L5  12' Lv4 12'   TR/HR 20x 2x10 ea 2x10 20x 20x   Mini squat 2x10 w/VC  1x10 2x10 2x10 2x10 with VC   LAQ 1.5# 2x10 1.5# 2x10 1.5#  2x10 1.5#  2x15 2# 2x15 with cues to avoid swinging and use more muscular control   T-band HS curl  Gree 2x10 Green 2x10 Green 2x15    SAQ  2x10 0#  2x10 1.5# 2x10    SLR 2x10 with slight bend in L knee  10x 2x10 15x 2x5 with VC for quadset and proper mechanics   St HS Curl        Supine clam shell Green 2x10 Green 2x10 Green 2x10 Green 2x10    St hip 3 way March, abd, ext 1.5#  20x ea 1.5#  2x10 ea BLE 2x10 each March, abd, ext 1.5#  20x ea March, abd, ext 1.5#  20x ea                           HEP   " Instructed and issued HEP  ([id number])     Ther Activity                        Gait Training                        Modalities        CP MHP 10' L  MHP 10'  left knee  MHP 10'  left knee

## 2025-02-25 ENCOUNTER — OFFICE VISIT (OUTPATIENT)
Dept: PHYSICAL THERAPY | Facility: CLINIC | Age: 85
End: 2025-02-25
Payer: MEDICARE

## 2025-02-25 DIAGNOSIS — M17.12 PRIMARY OSTEOARTHRITIS OF LEFT KNEE: Primary | ICD-10-CM

## 2025-02-25 DIAGNOSIS — M25.562 LEFT KNEE PAIN, UNSPECIFIED CHRONICITY: ICD-10-CM

## 2025-02-25 PROCEDURE — 97112 NEUROMUSCULAR REEDUCATION: CPT

## 2025-02-25 PROCEDURE — 97110 THERAPEUTIC EXERCISES: CPT

## 2025-02-25 NOTE — PROGRESS NOTES
"Daily Note     Today's date: 2025  Patient name: Lori Byrd  : 1940  MRN: [id number]  Referring provider: Peter Aceves DO  Dx:   Encounter Diagnosis     ICD-10-CM    1. Primary osteoarthritis of left knee  M17.12       2. Left knee pain, unspecified chronicity  M25.562                      Subjective:   I feel the knee is getting better      Objective: See treatment diary below      Assessment: Tolerated treatment well.   Able to increase reps with exercises.  Feels knee is steadily improving.  Using wrap around knee brace for support.  Patient would benefit from continued PT      Plan: Continue per plan of care.       Precautions:  Hx Breast Cancer- In Remission       Manuals 2-21-25 2-25-25 2-10-25 2-14-25 2-18-25   Gentle PROM/ Stretch LLE   held                             Neuro Re-Ed         Quad set 20x 5\"  30x 5\"  20x  5\" 20x  5\" 20x5\"   Add sq 20x 5\"  30x 5\"  20x  5\" 20x  5\"    Side stepping at rail 1.5# x10 R/L at rail  Green band 10x R/L at rail Green band 10x R/L at rail Blue band 10x R/L at rail Blue band 10x R/L at rail   Tandem stance                                Ther Ex        Nustep L5 12' L5 12' L5  12' L5  12' Lv4 12'   TR/HR 20x 2x10 ea 2x10 20x 20x   Mini squat 2x10 w/VC  2x10 2x10 2x10 2x10 with VC   LAQ 1.5# 2x10 2# 3x10 1.5#  2x10 1.5#  2x15 2# 2x15 with cues to avoid swinging and use more muscular control   T-band HS curl  Gree 3x10 Green 2x10 Green 2x15    SAQ  3x10 2#  2x10 1.5# 2x10    SLR 2x10 with slight bend in L knee   2x10 15x 2x5 with VC for quadset and proper mechanics   St HS Curl        Supine clam shell Green 2x10 Green 3x10 Green 2x10 Green 2x10    St hip 3 way March, abd, ext 1.5#  20x ea 2#  2x10 ea BLE 2x10 each March, abd, ext 1.5#  20x ea March, abd, ext 1.5#  20x ea                           HEP   Instructed and issued HEP  ([id number])     Ther Activity                        Gait Training                        Modalities        DELL P 10' L    PATRICIA " 10'  left knee

## 2025-02-28 ENCOUNTER — OFFICE VISIT (OUTPATIENT)
Dept: PHYSICAL THERAPY | Facility: CLINIC | Age: 85
End: 2025-02-28
Payer: MEDICARE

## 2025-02-28 DIAGNOSIS — M25.562 LEFT KNEE PAIN, UNSPECIFIED CHRONICITY: ICD-10-CM

## 2025-02-28 DIAGNOSIS — M17.12 PRIMARY OSTEOARTHRITIS OF LEFT KNEE: Primary | ICD-10-CM

## 2025-02-28 PROCEDURE — 97110 THERAPEUTIC EXERCISES: CPT

## 2025-02-28 PROCEDURE — 97112 NEUROMUSCULAR REEDUCATION: CPT

## 2025-02-28 NOTE — PROGRESS NOTES
"Daily Note     Today's date: 2025  Patient name: Lori Byrd  : 1940  MRN: [id number]  Referring provider: Peter Aceves DO  Dx:   Encounter Diagnosis     ICD-10-CM    1. Primary osteoarthritis of left knee  M17.12       2. Left knee pain, unspecified chronicity  M25.562                      Subjective:   I'm doing ok      Objective: See treatment diary below      Assessment: Tolerated treatment well.   Reports posterior knee feeling improved while lateral aspect of knee continues to have intermittent sharp pain.  Discussed off  brace but pt wishes to continue with neoprene sleeve at this time.  Patient would benefit from continued PT      Plan: Continue per plan of care.      Precautions:  Hx Breast Cancer- In Remission       Manuals 25   Gentle PROM/ Stretch LLE                                Neuro Re-Ed         Quad set 20x 5\"  30x 5\"  20x  5\" 20x  5\" 20x5\"   Add sq 20x 5\"  30x 5\"  20x  5\" 20x  5\"    Side stepping at rail 1.5# x10 R/L at rail  Green band 10x R/L at rail Green band 10x R/L at rail Blue band 10x R/L at rail Blue band 10x R/L at rail   Tandem stance                                Ther Ex        Nustep L5 12' L5 12' L5  12' L5  12' Lv4 12'   TR/HR 20x 2x10 ea 2x10 20x 20x   Mini squat 2x10 w/VC  2x10 2x10 2x10 2x10 with VC   LAQ 1.5# 2x10 2# 3x10 2#  3x10 1.5#  2x15 2# 2x15 with cues to avoid swinging and use more muscular control   T-band HS curl  Gree 3x10 Green 2x15 Green 2x15    SAQ  3x10 2#  3x10 2# 2x10    SLR 2x10 with slight bend in L knee   2x10 15x 2x5 with VC for quadset and proper mechanics   St HS Curl        Supine clam shell Green 2x10 Green 3x10 Green 2x10 Green 2x10    St hip 3 way March, abd, ext 1.5#  20x ea 2#  2x10 ea BLE 1.5#  2x10 each March, abd, ext 1.5#  20x ea March, abd, ext 1.5#  20x ea                           HEP        Ther Activity                        Gait Training                        Modalities      "   CP MHP 10' L    MHP 10'  left knee

## 2025-03-04 ENCOUNTER — OFFICE VISIT (OUTPATIENT)
Dept: PHYSICAL THERAPY | Facility: CLINIC | Age: 85
End: 2025-03-04
Payer: MEDICARE

## 2025-03-04 DIAGNOSIS — M17.12 PRIMARY OSTEOARTHRITIS OF LEFT KNEE: Primary | ICD-10-CM

## 2025-03-04 DIAGNOSIS — M25.562 LEFT KNEE PAIN, UNSPECIFIED CHRONICITY: ICD-10-CM

## 2025-03-04 PROCEDURE — 97112 NEUROMUSCULAR REEDUCATION: CPT

## 2025-03-04 PROCEDURE — 97110 THERAPEUTIC EXERCISES: CPT

## 2025-03-04 NOTE — PROGRESS NOTES
"Daily Note     Today's date: 3/4/2025  Patient name: Lori Byrd  : 1940  MRN: [id number]  Referring provider: Peter Aceves DO  Dx:   Encounter Diagnosis     ICD-10-CM    1. Primary osteoarthritis of left knee  M17.12       2. Left knee pain, unspecified chronicity  M25.562                      Subjective:  The knee felt good yesterday      Objective: See treatment diary below      Assessment: Tolerated treatment well.  Continued intermittent sharp pain lateral aspect of left knee.  No difficulty with leg press today.  Patient would benefit from continued PT      Plan: Continue per plan of care.      Precautions:  Hx Breast Cancer- In Remission       Manuals 2-21-25 2-25-25 2-28-25 3-4-25 2-18-25   Gentle PROM/ Stretch LLE                                Neuro Re-Ed         Quad set 20x 5\"  30x 5\"  20x  5\" 20x  5\" 20x5\"   Add sq 20x 5\"  30x 5\"  20x  5\" 20x  5\"    Side stepping at rail 1.5# x10 R/L at rail  Green band 10x R/L at rail Green band 10x R/L at rail Blue band 10x R/L at rail Blue band 10x R/L at rail   Tandem stance                                Ther Ex        Nustep L5 12' L5 12' L5  12' L5  12' Lv4 12'   Leg Press    10#  2x10    TR/HR 20x 2x10 ea 2x10 20x 20x   Mini squat 2x10 w/VC  2x10 2x10 2x10 2x10 with VC   LAQ 1.5# 2x10 2# 3x10 2#  3x10 2#  2x15 2# 2x15 with cues to avoid swinging and use more muscular control   T-band HS curl  Gree 3x10 Green 2x15 Green 2x15    SAQ  3x10 2#  3x10 2# 2#  3x10    SLR 2x10 with slight bend in L knee   2x10 20x 2x5 with VC for quadset and proper mechanics   St HS Curl        Supine clam shell Green 2x10 Green 3x10 Green 2x10 Green 2x10    St hip 3 way March, abd, ext 1.5#  20x ea 2#  2x10 ea BLE 1.5#  2x10 each March, abd, ext 2#  20x ea March, abd, ext 1.5#  20x ea                           HEP        Ther Activity                        Gait Training                        Modalities        CP MHP 10' L                                                 "

## 2025-03-07 ENCOUNTER — OFFICE VISIT (OUTPATIENT)
Dept: PHYSICAL THERAPY | Facility: CLINIC | Age: 85
End: 2025-03-07
Payer: MEDICARE

## 2025-03-07 DIAGNOSIS — M25.562 LEFT KNEE PAIN, UNSPECIFIED CHRONICITY: ICD-10-CM

## 2025-03-07 DIAGNOSIS — M17.12 PRIMARY OSTEOARTHRITIS OF LEFT KNEE: Primary | ICD-10-CM

## 2025-03-07 PROCEDURE — 97110 THERAPEUTIC EXERCISES: CPT

## 2025-03-07 PROCEDURE — 97112 NEUROMUSCULAR REEDUCATION: CPT

## 2025-03-07 NOTE — PROGRESS NOTES
"Daily Note     Today's date: 3/7/2025  Patient name: Lori Byrd  : 1940  MRN: [id number]  Referring provider: Peter Aceves DO  Dx:   Encounter Diagnosis     ICD-10-CM    1. Primary osteoarthritis of left knee  M17.12       2. Left knee pain, unspecified chronicity  M25.562           Start Time: 0855          Subjective: Lori reports most discomfort when getting out of a chair or getting out of the car in regards to L knee.       Objective: See treatment diary below      Assessment: Visual and VC to ensure correct exercise technique. Fatigue with SLR. Improvement in squat mechanics compared to previous visits. Pt would continue to benefit from skilled PT. Progress as able.       Plan: Continue with current POC to address pt deficits.      Precautions:  Hx Breast Cancer- In Remission       Manuals 2-21-25 2-25-25 2-28-25 3-4-25 3-7-25   Gentle PROM/ Stretch LLE                                Neuro Re-Ed         Quad set 20x 5\"  30x 5\"  20x  5\" 20x  5\" 20x 5\"    Add sq 20x 5\"  30x 5\"  20x  5\" 20x  5\" 20x 5\"    Side stepping at rail 1.5# x10 R/L at rail  Green band 10x R/L at rail Green band 10x R/L at rail Blue band 10x R/L at rail Blue band 10x R/L at rail    Tandem stance                                Ther Ex        Nustep L5 12' L5 12' L5  12' L5  12' L5 12'   Leg Press    10#  2x10 10# 2x10   TR/HR 20x 2x10 ea 2x10 20x 20x ea   Mini squat 2x10 w/VC  2x10 2x10 2x10 2x10   LAQ 1.5# 2x10 2# 3x10 2#  3x10 2#  2x15 2# 2x15   T-band HS curl  Gree 3x10 Green 2x15 Green 2x15 Green 2x15    SAQ  3x10 2#  3x10 2# 2#  3x10 2# 3x10   SLR 2x10 with slight bend in L knee   2x10 20x 20x   St HS Curl        Supine clam shell Green 2x10 Green 3x10 Green 2x10 Green 2x10 Blue 2x10   St hip 3 way March, abd, ext 1.5#  20x ea 2#  2x10 ea BLE 1.5#  2x10 each March, abd, ext 2#  20x ea March, abd, ext 2# 20x ea                           HEP        Ther Activity                        Gait Training                      "   Modalities        CP P 10' L

## 2025-03-14 ENCOUNTER — APPOINTMENT (OUTPATIENT)
Dept: PHYSICAL THERAPY | Facility: CLINIC | Age: 85
End: 2025-03-14
Payer: MEDICARE

## 2025-03-14 ENCOUNTER — OFFICE VISIT (OUTPATIENT)
Dept: PHYSICAL THERAPY | Facility: CLINIC | Age: 85
End: 2025-03-14
Payer: MEDICARE

## 2025-03-14 DIAGNOSIS — M25.562 LEFT KNEE PAIN, UNSPECIFIED CHRONICITY: ICD-10-CM

## 2025-03-14 DIAGNOSIS — M17.12 PRIMARY OSTEOARTHRITIS OF LEFT KNEE: Primary | ICD-10-CM

## 2025-03-14 PROCEDURE — 97112 NEUROMUSCULAR REEDUCATION: CPT | Performed by: PHYSICAL THERAPIST

## 2025-03-14 PROCEDURE — 97112 NEUROMUSCULAR REEDUCATION: CPT

## 2025-03-14 PROCEDURE — 97110 THERAPEUTIC EXERCISES: CPT | Performed by: PHYSICAL THERAPIST

## 2025-03-14 PROCEDURE — 97110 THERAPEUTIC EXERCISES: CPT

## 2025-03-14 NOTE — PROGRESS NOTES
"Daily Note     Today's date: 3/14/2025  Patient name: Lori Byrd  : 1940  MRN: [id number]  Referring provider: Peter Aceves DO  Dx:   Encounter Diagnosis     ICD-10-CM    1. Primary osteoarthritis of left knee  M17.12       2. Left knee pain, unspecified chronicity  M25.562                      Subjective:  My knees and legs are sore but I've been doing more at home, walking outside      Objective: See treatment diary below      Assessment: Tolerated treatment well.   Reports feeling improved with PT overall.  Trying to increase activity levels outside PT as she is beginning to walk in community again.  Continued intermittent pain left knee.  Patient would benefit from continued PT      Plan: Continue per plan of care.      Precautions:  Hx Breast Cancer- In Remission       Manuals 3-14-25 2-25-25 2-28-25 3-4-25 3-7-25   Gentle PROM/ Stretch LLE                                Neuro Re-Ed         Quad set 20x 5\"  30x 5\"  20x  5\" 20x  5\" 20x 5\"    Add sq 20x 5\"  30x 5\"  20x  5\" 20x  5\" 20x 5\"    Side stepping at rail Green band x10 R/L at rail  Green band 10x R/L at rail Green band 10x R/L at rail Blue band 10x R/L at rail Blue band 10x R/L at rail    Tandem stance                                Ther Ex        Nustep L5 12' L5 12' L5  12' L5  12' L5 12'   Leg Press 10#  3x10   10#  2x10 10# 2x10   TR/HR 20x 2x10 ea 2x10 20x 20x ea   Mini squat  2x10 2x10 2x10 2x10   LAQ 2# 2x10 Faheem 2# 3x10 2#  3x10 2#  2x15 2# 2x15   T-band HS curl Green 2x15 Faheem Gree 3x10 Green 2x15 Green 2x15 Green 2x15    SAQ 2#  3x10 3\" Faheem 3x10 2#  3x10 2# 2#  3x10 2# 3x10   SLR 2x10   2x10 20x 20x   St HS Curl        Supine clam shell Green 2x10 Green 3x10 Green 2x10 Green 2x10 Blue 2x10   St hip 3 way March, abd, ext  2#  20x ea BLE 2#  2x10 ea BLE 1.5#  2x10 each March, abd, ext 2#  20x ea March, abd, ext 2# 20x ea                           HEP        Ther Activity                        Gait Training                      "   Modalities        CP

## 2025-03-18 ENCOUNTER — OFFICE VISIT (OUTPATIENT)
Dept: PHYSICAL THERAPY | Facility: CLINIC | Age: 85
End: 2025-03-18
Payer: MEDICARE

## 2025-03-18 DIAGNOSIS — M25.562 LEFT KNEE PAIN, UNSPECIFIED CHRONICITY: ICD-10-CM

## 2025-03-18 DIAGNOSIS — M17.12 PRIMARY OSTEOARTHRITIS OF LEFT KNEE: Primary | ICD-10-CM

## 2025-03-18 PROCEDURE — 97110 THERAPEUTIC EXERCISES: CPT

## 2025-03-18 PROCEDURE — 97112 NEUROMUSCULAR REEDUCATION: CPT

## 2025-03-18 NOTE — PROGRESS NOTES
"Daily Note     Today's date: 3/18/2025  Patient name: Lori Byrd  : 1940  MRN: [id number]  Referring provider: Peter Aceves DO  Dx:   Encounter Diagnosis     ICD-10-CM    1. Primary osteoarthritis of left knee  M17.12       2. Left knee pain, unspecified chronicity  M25.562                      Subjective: Lori reports having a bad day yesterday in reference to L knee. Today she reports \"not too bad.\"      Objective: See treatment diary below      Assessment: Cues to avoid faheem knee valgus with leg press resulted in no pain with adjustment. Visual and VC to ensure correct exercise technique. Sharp pain experienced with sidestepping with insidious onset but quickly subsided. Progressed reps for hip abd/add to further progress strength; tolerated well. Pt would continue to benefit from skilled PT.       Plan: Continue with current POC to address pt deficits.      Precautions:  Hx Breast Cancer- In Remission       Manuals 3-14-25 3-18-25 2-28-25 3-4-25 3-7-25   Gentle PROM/ Stretch LLE                                Neuro Re-Ed         Quad set 20x 5\"  20x 5\"  20x  5\" 20x  5\" 20x 5\"    Add sq 20x 5\"  30x 5\"  20x  5\" 20x  5\" 20x 5\"    Side stepping at rail Green band x10 R/L at rail  Green band x10 R/L at rail  Green band 10x R/L at rail Blue band 10x R/L at rail Blue band 10x R/L at rail    Tandem stance                                Ther Ex        Nustep L5 12' L5 12' L5  12' L5  12' L5 12'   Leg Press 10#  3x10 10# 3x10  10#  2x10 10# 2x10   TR/HR 20x 20x 2x10 20x 20x ea   Mini squat  2x10 2x10 2x10 2x10   LAQ 2# 2x10 Faheem 2# 2x10 faheem  2#  3x10 2#  2x15 2# 2x15   T-band HS curl Green 2x15 Faheem Green 2x15 faheem  Green 2x15 Green 2x15 Green 2x15    SAQ 2#  3x10 3\" Faheem 2#  3x10 3\" Bi 3x10 2# 2#  3x10 2# 3x10   SLR 2x10  2x10 2x10 20x 20x   St HS Curl        Supine clam shell Green 2x10 Green 3x10 Green 2x10 Green 2x10 Blue 2x10   St hip 3 way March, abd, ext  2#  20x ea BLE March, abd, ext  2#  20x ea BLE " 1.5#  2x10 each March, abd, ext 2#  20x ea March, abd, ext 2# 20x ea                           HEP        Ther Activity                        Gait Training                        Modalities        CP

## 2025-03-21 ENCOUNTER — OFFICE VISIT (OUTPATIENT)
Dept: PHYSICAL THERAPY | Facility: CLINIC | Age: 85
End: 2025-03-21
Payer: MEDICARE

## 2025-03-21 DIAGNOSIS — M25.562 LEFT KNEE PAIN, UNSPECIFIED CHRONICITY: ICD-10-CM

## 2025-03-21 DIAGNOSIS — M17.12 PRIMARY OSTEOARTHRITIS OF LEFT KNEE: Primary | ICD-10-CM

## 2025-03-21 PROCEDURE — 97112 NEUROMUSCULAR REEDUCATION: CPT

## 2025-03-21 PROCEDURE — 97110 THERAPEUTIC EXERCISES: CPT

## 2025-03-21 NOTE — PROGRESS NOTES
"Daily Note     Today's date: 3/21/2025  Patient name: Lori Byrd  : 1940  MRN: [id number]  Referring provider: Peter Aceves DO  Dx:   Encounter Diagnosis     ICD-10-CM    1. Primary osteoarthritis of left knee  M17.12       2. Left knee pain, unspecified chronicity  M25.562                      Subjective: Lori reports doing good in reference to L knee. She reports walking more and can go about 3/4 mile.       Objective: See treatment diary below      Assessment: Visual and VC to ensure correct exercise technique. Use of chair as external cue during squats resulted in better posterior shift of pelvis and cues to avoid faheem knee valgus. Pt would continue to benefit from skilled PT.       Plan: Continue with current POC to address pt deficits.      Precautions:  Hx Breast Cancer- In Remission       Manuals 3-14-25 3-18-25 3-21-25 3-4-25 3-7-25   Gentle PROM/ Stretch LLE                                Neuro Re-Ed         Quad set 20x 5\"  20x 5\"  20x 5\"  20x  5\" 20x 5\"    Add sq 20x 5\"  30x 5\"  30x 5\"  20x  5\" 20x 5\"    Side stepping at rail Green band x10 R/L at rail  Green band x10 R/L at rail  Green band x10 R/L at rail  Blue band 10x R/L at rail Blue band 10x R/L at rail    Tandem stance                                Ther Ex        Nustep L5 12' L5 12' L5 12' L5  12' L5 12'   Leg Press 10#  3x10 10# 3x10 10# 3x10 10#  2x10 10# 2x10   TR/HR 20x 20x 20x  20x 20x ea   Mini squat  2x10 2x10 with ext cue of chair  2x10 2x10   LAQ 2# 2x10 Faheem 2# 2x10 faheem  2# 2x10 faheem  2#  2x15 2# 2x15   T-band HS curl Green 2x15 Faheem Green 2x15 faheem  Green 2x15 faheem  Green 2x15 Green 2x15    SAQ 2#  3x10 3\" Faheem 2#  3x10 3\" Bi 2# 3x10 3\" faheem  2#  3x10 2# 3x10   SLR 2x10  2x10 2x10 L  20x 20x   St HS Curl        Supine clam shell Green 2x10 Green 3x10 Green 3x10 Green 2x10 Blue 2x10   St hip 3 way March, abd, ext  2#  20x ea BLE March, abd, ext  2#  20x ea BLE March, abd, ext  2#  20x ea BLE March, abd, ext 2#  20x ea March, " abd, ext 2# 20x ea                           HEP        Ther Activity                        Gait Training                        Modalities        CP   MHP 10' L knee

## 2025-03-24 ENCOUNTER — APPOINTMENT (OUTPATIENT)
Dept: PHYSICAL THERAPY | Facility: CLINIC | Age: 85
End: 2025-03-24
Payer: MEDICARE

## 2025-03-25 ENCOUNTER — EVALUATION (OUTPATIENT)
Dept: PHYSICAL THERAPY | Facility: CLINIC | Age: 85
End: 2025-03-25
Payer: MEDICARE

## 2025-03-25 DIAGNOSIS — M17.12 PRIMARY OSTEOARTHRITIS OF LEFT KNEE: Primary | ICD-10-CM

## 2025-03-25 DIAGNOSIS — M25.562 LEFT KNEE PAIN, UNSPECIFIED CHRONICITY: ICD-10-CM

## 2025-03-25 PROCEDURE — 97112 NEUROMUSCULAR REEDUCATION: CPT | Performed by: PHYSICAL THERAPIST

## 2025-03-25 PROCEDURE — 97110 THERAPEUTIC EXERCISES: CPT | Performed by: PHYSICAL THERAPIST

## 2025-03-25 NOTE — PROGRESS NOTES
PT RE-EVALUATION     Today's date: 3/25/2025  Patient name: Lori Byrd  : 1940  MRN: [id number]  Referring provider: Peter Aceves DO  Dx:   Encounter Diagnosis     ICD-10-CM    1. Primary osteoarthritis of left knee  M17.12       2. Left knee pain, unspecified chronicity  M25.562                        Assessment  Impairments: abnormal gait, abnormal or restricted ROM, activity intolerance, impaired physical strength, lacks appropriate home exercise program, pain with function and activity limitations    Assessment details: Pt presents with left knee pain.  Onset approx 3-4 months ago when foot slipped out of peddle while riding bike.  Has had worsening symptoms.  Decreased strength and Rom of knee noted.  Increased swelling noted knee and lower leg.  Gait antalgic.  Genu Valgus deformity left knee.  Pain increases with twisting/pivoting LLE. X-ray revealed degenerative changes.  Will benefit from PT tx to decrease symptoms and restore normal functional level.      25:  Pt showing improvement in pain but still showing signs of weakness and some balance issues.    25:  Pt reports continued varied pain left knee.  Symptoms primarily lateral aspect.  Limited gait/ADL's due to symptoms.  Non-specific movement triggers pain.  Vagus deformity noted.  Recently obtained neoprene brace which helps swelling and helps with stability.  Improved strength however continued deficits remain.  Crepitus noted left knee with movement. Reports Ortho MD follow up next month.  Will benefit from continued PT tx to decrease symptoms and improve functional level.      3-25-25:  Pt reports continued varied symptoms left knee.  Pain primarily lateral aspect.  Pain varies with activity.  Using multiple braces left knee.  Intermittent sharp pain noted.  Gait minimally antalgic.  Has Ortho MD appt upcoming.  Will continue per MD instructions.       Prognosis: good    Goals  ST. Decrease pain 50% 6 wk    2.   Increase Knee ROM to 0-120 degrees 6 wk.   3.  Increase knee strength to 4/5 6 wk.   4.  Pt will demonstrate normal gait pattern on level surfaces 6 wk   5.  Pt will report no difficulty with light ADL's 6 wk  (/met/partially met)  LT.  Pt will report no pain 12 wk  2.  Increase Knee ROM to WNL 12 wk   3. Increase knee strength to WNL 12 wk   4.  Pt will report no limitations with ambulation 12 wk   5.  Pt will report no limitations with ADL's 12 wk (partially met/not met)    Plan  Patient would benefit from: skilled physical therapy and PT eval  Planned modality interventions: cryotherapy and thermotherapy: hydrocollator packs    Planned therapy interventions: joint mobilization, manual therapy, neuromuscular re-education, balance, self care, strengthening, stretching, therapeutic activities, therapeutic exercise, functional ROM exercises, flexibility and home exercise program    Frequency: 3x week  Duration in weeks: 12  Treatment plan discussed with: patient        Subjective Evaluation    History of Present Illness  Mechanism of injury: Pt presents with left knee pain.  Reports 3-4 months ago riding recumbent bike and foot slipped of peddle and struck lower leg and felt she pulled a muscle in back of leg.  Has had increasing pain.  Walking becoming difficult.  No instability noted in knee.  Painful clicking at times.  Swelling noted.  Increased difficulty moving sit to stand.  No issues with left knee prior.  Tried volteran cream and tylenol.  X-ray revealed arthritic changes.  Pain primarily posterior knee and lateral knee.      25:  Pt returns to PT after being away for a month.  Pt reports she does notice her knee is getting better and has less swelling.  She states that putting pressure/ compression on the area helps with pain.  Patient Goals  Patient goals for therapy: decreased pain and increased strength  Patient goal: Get rid of the pain and go back to exercise  Pain  Current pain ratin  At  "best pain ratin  At worst pain ratin  Location: Left Knee (lateral aspect)  Quality: sharp  Relieving factors: ice and rest  Exacerbated by: moving sit to stand after prolonged sitting, certain movements (twisting)  Progression: improved    Exercise history: Normally walks 1-2 miles a few days a week.  Bikes daily prior    Treatments  Current treatment: physical therapy        Objective     Observations     Additional Observation Details  Genu Valgus deformity      Tenderness   Left Knee   Tenderness in the lateral joint line, LCL (distal) and LCL (proximal).     Active Range of Motion   Left Knee   Flexion: 130 degrees   Extension: 2 degrees   Extensor la degrees     Mobility   Patellar Mobility:   Left Knee   WFL: medial, lateral, superior and inferior.     Strength/Myotome Testing     Left Knee   Flexion: 4  Extension: 4    Right Knee   Flexion: 4  Extension: 4    Ambulation     Ambulation: Stairs   Ascend stairs: independent  Pattern: reciprocal  Railings: two rails  Descend stairs: independent  Pattern: reciprocal  Railings: two rails    Additional Stairs Ambulation Details  Has to perform in non-reciprocal manner without handrail     Observational Gait   Gait: antalgic   Decreased walking speed, stride length and left stance time.     Additional Observational Gait Details  Valgus deformity noted left knee       Precautions:  Hx Breast Cancer- In Remission       Manuals 3-14-25 3-18-25 3-21-25 3-25-25 3-7-25   Gentle PROM/ Stretch LLE                                Neuro Re-Ed         Quad set 20x 5\"  20x 5\"  20x 5\"  20x  5\" 20x 5\"    Add sq 20x 5\"  30x 5\"  30x 5\"  20x  5\" 20x 5\"    Side stepping at rail Green band x10 R/L at rail  Green band x10 R/L at rail  Green band x10 R/L at rail  Green band 10x R/L at rail Blue band 10x R/L at rail    Tandem stance                                Ther Ex        Nustep L5 12' L5 12' L5 12' L5  12' L5 12'   Leg Press 10#  3x10 10# 3x10 10# 3x10 10#  3x10 10# " "2x10   TR/HR 20x 20x 20x  20x 20x ea   Mini squat  2x10 2x10 with ext cue of chair  2x10 2x10   LAQ 2# 2x10 Faheem 2# 2x10 faheem  2# 2x10 faheem  2#  2x15 2# 2x15   T-band HS curl Green 2x15 Faheem Green 2x15 faheem  Green 2x15 faheem  Green 2x15 Green 2x15    SAQ 2#  3x10 3\" Faheem 2#  3x10 3\" Bi 2# 3x10 3\" faheem  2#  3x10 2# 3x10   SLR 2x10  2x10 2x10 L   20x   St HS Curl        Supine clam shell Green 2x10 Green 3x10 Green 3x10 Green 2x10 Blue 2x10   St hip 3 way March, abd, ext  2#  20x ea BLE March, abd, ext  2#  20x ea BLE March, abd, ext  2#  20x ea BLE March, abd, ext 2#  20x ea March, abd, ext 2# 20x ea                           HEP        Ther Activity                        Gait Training                        Modalities        CP   MHP 10' L knee                          "

## 2025-03-28 ENCOUNTER — OFFICE VISIT (OUTPATIENT)
Dept: PHYSICAL THERAPY | Facility: CLINIC | Age: 85
End: 2025-03-28
Payer: MEDICARE

## 2025-03-28 DIAGNOSIS — M25.562 LEFT KNEE PAIN, UNSPECIFIED CHRONICITY: ICD-10-CM

## 2025-03-28 DIAGNOSIS — M17.12 PRIMARY OSTEOARTHRITIS OF LEFT KNEE: Primary | ICD-10-CM

## 2025-03-28 PROCEDURE — 97112 NEUROMUSCULAR REEDUCATION: CPT | Performed by: PHYSICAL THERAPIST

## 2025-03-28 PROCEDURE — 97110 THERAPEUTIC EXERCISES: CPT | Performed by: PHYSICAL THERAPIST

## 2025-03-28 NOTE — PROGRESS NOTES
"Daily Note     Today's date: 3/28/2025  Patient name: Lori Byrd  : 1940  MRN: [id number]  Referring provider: Peter Aceves DO  Dx:   Encounter Diagnosis     ICD-10-CM    1. Primary osteoarthritis of left knee  M17.12       2. Left knee pain, unspecified chronicity  M25.562                      Subjective:   I saw the Ortho MD and he gave me an injection in the knee.  It feels a little better but my back is sore.        Objective: See treatment diary below      Assessment: Tolerated treatment well.  Reports recent injection has helped with symptoms left knee.  SLR held due to pain.  Reports MD advised to walk in community as tolerated.  Patient would benefit from continued PT      Plan: Continue per plan of care.       Precautions:  Hx Breast Cancer- In Remission       Manuals 3-14-25 3-18-25 3-21-25 3-25-25 3-28-25   Gentle PROM/ Stretch LLE                                Neuro Re-Ed         Quad set 20x 5\"  20x 5\"  20x 5\"  20x  5\" 20x 5\"    Add sq 20x 5\"  30x 5\"  30x 5\"  20x  5\" 20x 5\"    Side stepping at rail Green band x10 R/L at rail  Green band x10 R/L at rail  Green band x10 R/L at rail  Green band 10x R/L at rail Green band 10x R/L at rail    Tandem stance                                Ther Ex        Nustep L5 12' L5 12' L5 12' L5  12' L6 12'   Leg Press 10#  3x10 10# 3x10 10# 3x10 10#  3x10 10# 3x10   TR/HR 20x 20x 20x  20x 20x ea   Mini squat  2x10 2x10 with ext cue of chair  2x10 2x10   LAQ 2# 2x10 Faheem 2# 2x10 faheem  2# 2x10 faheem  2#  2x15 2# 2x15   T-band HS curl Green 2x15 Faheem Green 2x15 faheem  Green 2x15 faheem  Green 2x15 Green 2x15    SAQ 2#  3x10 3\" Faheem 2#  3x10 3\" Bi 2# 3x10 3\" faheem  2#  3x10 2# 3x10   SLR 2x10  2x10 2x10 L      St HS Curl        Supine clam shell Green 2x10 Green 3x10 Green 3x10 Green 2x10 Green 2x10   St hip 3 way March, abd, ext  2#  20x ea BLE March, abd, ext  2#  20x ea BLE March, abd, ext  2#  20x ea BLE March, abd, ext 2#  20x ea March, abd, ext 2# 20x ea             "               HEP        Ther Activity                        Gait Training                        Modalities        CP   P 10' L knee   P 10'

## 2025-03-29 DIAGNOSIS — I10 ESSENTIAL HYPERTENSION: ICD-10-CM

## 2025-03-30 RX ORDER — RAMIPRIL 10 MG/1
10 CAPSULE ORAL EVERY MORNING
Qty: 90 CAPSULE | Refills: 1 | Status: SHIPPED | OUTPATIENT
Start: 2025-03-30

## 2025-04-01 ENCOUNTER — OFFICE VISIT (OUTPATIENT)
Dept: PHYSICAL THERAPY | Facility: CLINIC | Age: 85
End: 2025-04-01
Payer: MEDICARE

## 2025-04-01 DIAGNOSIS — M25.562 LEFT KNEE PAIN, UNSPECIFIED CHRONICITY: ICD-10-CM

## 2025-04-01 DIAGNOSIS — M17.12 PRIMARY OSTEOARTHRITIS OF LEFT KNEE: Primary | ICD-10-CM

## 2025-04-01 PROCEDURE — 97112 NEUROMUSCULAR REEDUCATION: CPT | Performed by: PHYSICAL THERAPIST

## 2025-04-01 PROCEDURE — 97110 THERAPEUTIC EXERCISES: CPT | Performed by: PHYSICAL THERAPIST

## 2025-04-01 NOTE — PROGRESS NOTES
"Daily Note     Today's date: 2025  Patient name: Lori Byrd  : 1940  MRN: [id number]  Referring provider: Peter Aceves DO  Dx:   Encounter Diagnosis     ICD-10-CM    1. Primary osteoarthritis of left knee  M17.12       2. Left knee pain, unspecified chronicity  M25.562                      Subjective:   I'm feeling ok today.  I took it easy yesterday.      Objective: See treatment diary below      Assessment: Tolerated treatment well.   Continued lower level symptoms left knee since cortisone injection.  No instability noted.  Patient would benefit from continued PT      Plan: Continue per plan of care.         Precautions:  Hx Breast Cancer- In Remission       Manuals 4-1-25 3-18-25 3-21-25 3-25-25 3-28-25   Gentle PROM/ Stretch LLE                                Neuro Re-Ed         Quad set 20x 5\"  20x 5\"  20x 5\"  20x  5\" 20x 5\"    Add sq 20x 5\"  30x 5\"  30x 5\"  20x  5\" 20x 5\"    Side stepping at rail Green band x10 R/L at rail  Green band x10 R/L at rail  Green band x10 R/L at rail  Green band 10x R/L at rail Green band 10x R/L at rail    Tandem stance                                Ther Ex        Nustep L5 12' L5 12' L5 12' L5  12' L6 12'   Leg Press  10# 3x10 10# 3x10 10#  3x10 10# 3x10   TR/HR 20x 20x 20x  20x 20x ea   Mini squat 2x10 2x10 2x10 with ext cue of chair  2x10 2x10   LAQ 2# 2x10 Faheem 2# 2x10 faheem  2# 2x10 faheem  2#  2x15 2# 2x15   T-band HS curl Green 2x15 Faheem Green 2x15 faheem  Green 2x15 faheem  Green 2x15 Green 2x15    SAQ 2#  3x10 3\" Faheem 2#  3x10 3\" Bi 2# 3x10 3\" faheem  2#  3x10 2# 3x10   SLR  2x10 2x10 L      St HS Curl        Supine clam shell Green 2x10 Green 3x10 Green 3x10 Green 2x10 Green 2x10   St hip 3 way March, abd, ext  2#  20x ea BLE March, abd, ext  2#  20x ea BLE March, abd, ext  2#  20x ea BLE March, abd, ext 2#  20x ea March, abd, ext 2# 20x ea                           HEP        Ther Activity                        Gait Training                        Modalities      "   CP MHP 10'  left knee  MHP 10' L knee   MHP 10'

## 2025-04-04 ENCOUNTER — OFFICE VISIT (OUTPATIENT)
Dept: PHYSICAL THERAPY | Facility: CLINIC | Age: 85
End: 2025-04-04
Payer: MEDICARE

## 2025-04-04 DIAGNOSIS — M17.12 PRIMARY OSTEOARTHRITIS OF LEFT KNEE: Primary | ICD-10-CM

## 2025-04-04 DIAGNOSIS — M25.562 LEFT KNEE PAIN, UNSPECIFIED CHRONICITY: ICD-10-CM

## 2025-04-04 PROCEDURE — 97112 NEUROMUSCULAR REEDUCATION: CPT | Performed by: PHYSICAL THERAPIST

## 2025-04-04 PROCEDURE — 97110 THERAPEUTIC EXERCISES: CPT | Performed by: PHYSICAL THERAPIST

## 2025-04-04 NOTE — PROGRESS NOTES
"Daily Note     Today's date: 2025  Patient name: Lori Byrd  : 1940  MRN: [id number]  Referring provider: Peter Aceves DO  Dx:   Encounter Diagnosis     ICD-10-CM    1. Primary osteoarthritis of left knee  M17.12       2. Left knee pain, unspecified chronicity  M25.562                      Subjective:   The knee is cracking but its not painful since the cortisone injection      Objective: See treatment diary below      Assessment: Tolerated treatment well.   Cracking noted with LAQ but non-painful.  Reports apprehensive with steps.  Patient would benefit from continued PT      Plan: Continue per plan of care.       Precautions:  Hx Breast Cancer- In Remission       Manuals 4-1-25 4-4-25 3-21-25 3-25-25 3-28-25   Gentle PROM/ Stretch LLE                                Neuro Re-Ed         Quad set 20x 5\"  20x 5\"  20x 5\"  20x  5\" 20x 5\"    Add sq 20x 5\"  20x 5\"  30x 5\"  20x  5\" 20x 5\"    Side stepping at rail Green band x10 R/L at rail  Green band x10 R/L at rail  Green band x10 R/L at rail  Green band 10x R/L at rail Green band 10x R/L at rail    Tandem stance                                Ther Ex        Nustep L5 12' L5 12' L5 12' L5  12' L6 12'   Leg Press   10# 3x10 10#  3x10 10# 3x10   TR/HR 20x 20x 20x  20x 20x ea   Mini squat 2x10 2x10 2x10 with ext cue of chair  2x10 2x10   LAQ 2# 2x10 Faheem 2# 2x10 faheem  2# 2x10 faheem  2#  2x15 2# 2x15   T-band HS curl Green 2x15 Faheem Green 2x15 faheem  Green 2x15 faheem  Green 2x15 Green 2x15    SAQ 2#  3x10 3\" Faheem 2#  3x10 3\" Bi 2# 3x10 3\" faheem  2#  3x10 2# 3x10   SLR  1x10 2x10 L      St HS Curl        Supine clam shell Green 2x10 Green 2x10 Green 3x10 Green 2x10 Green 2x10   St hip 3 way March, abd, ext  2#  20x ea BLE March, abd, ext  2#  20x ea BLE March, abd, ext  2#  20x ea BLE March, abd, ext 2#  20x ea March, abd, ext 2# 20x ea                           HEP        Ther Activity                        Gait Training                        Modalities        CP " MHP 10'  left knee MHP 10'  Left knee  MHP 10' L knee   MHP 10'

## 2025-04-07 ENCOUNTER — APPOINTMENT (OUTPATIENT)
Dept: PHYSICAL THERAPY | Facility: CLINIC | Age: 85
End: 2025-04-07
Payer: MEDICARE

## 2025-04-07 NOTE — PROGRESS NOTES
"Daily Note     Today's date: 2025  Patient name: Lori Byrd  : 1940  MRN: [id number]  Referring provider: Peter Aceves DO  Dx: No diagnosis found.               Subjective: Lori reports       Objective: See treatment diary below      Assessment:       Plan: Continue with current POC to address pt deficits.      Precautions:  Hx Breast Cancer- In Remission       Manuals 4-1-25 4-4-25 4-7-25 3-25-25 3-28-25   Gentle PROM/ Stretch LLE                                Neuro Re-Ed         Quad set 20x 5\"  20x 5\"   20x  5\" 20x 5\"    Add sq 20x 5\"  20x 5\"   20x  5\" 20x 5\"    Side stepping at rail Green band x10 R/L at rail  Green band x10 R/L at rail   Green band 10x R/L at rail Green band 10x R/L at rail    Tandem stance                                Ther Ex        Nustep L5 12' L5 12'  L5  12' L6 12'   Leg Press    10#  3x10 10# 3x10   TR/HR 20x 20x  20x 20x ea   Mini squat 2x10 2x10  2x10 2x10   LAQ 2# 2x10 Faheem 2# 2x10 faheem   2#  2x15 2# 2x15   T-band HS curl Green 2x15 Faheem Green 2x15 faheem   Green 2x15 Green 2x15    SAQ 2#  3x10 3\" Faheem 2#  3x10 3\" Bi  2#  3x10 2# 3x10   SLR  1x10      St HS Curl        Supine clam shell Green 2x10 Green 2x10  Green 2x10 Green 2x10   St hip 3 way March, abd, ext  2#  20x ea BLE March, abd, ext  2#  20x ea BLE  March, abd, ext 2#  20x ea March, abd, ext 2# 20x ea                           HEP        Ther Activity                        Gait Training                        Modalities        CP MHP 10'  left knee MHP 10'  Left knee    MHP 10'                                                        "

## 2025-04-08 ENCOUNTER — OFFICE VISIT (OUTPATIENT)
Dept: PHYSICAL THERAPY | Facility: CLINIC | Age: 85
End: 2025-04-08
Payer: MEDICARE

## 2025-04-08 DIAGNOSIS — M17.12 PRIMARY OSTEOARTHRITIS OF LEFT KNEE: Primary | ICD-10-CM

## 2025-04-08 DIAGNOSIS — M25.562 LEFT KNEE PAIN, UNSPECIFIED CHRONICITY: ICD-10-CM

## 2025-04-08 PROCEDURE — 97112 NEUROMUSCULAR REEDUCATION: CPT | Performed by: PHYSICAL THERAPIST

## 2025-04-08 PROCEDURE — 97110 THERAPEUTIC EXERCISES: CPT | Performed by: PHYSICAL THERAPIST

## 2025-04-08 NOTE — PROGRESS NOTES
"Daily Note     Today's date: 2025  Patient name: Lori Byrd  : 1940  MRN: [id number]  Referring provider: Peter Aceves DO  Dx:   Encounter Diagnosis     ICD-10-CM    1. Primary osteoarthritis of left knee  M17.12       2. Left knee pain, unspecified chronicity  M25.562                      Subjective:  I'm doing ok today      Objective: See treatment diary below      Assessment: Reports knee was sore Saturday after standing for longer durations in posterior aspect and calf.  This resolved by the next day.  No difficulties with tx today.        Plan: Continue with current POC to address pt deficits.      Precautions:  Hx Breast Cancer- In Remission       Manuals 4-1-25 4-4-25 4-8-25 3-25-25 3-28-25   Gentle PROM/ Stretch LLE                                Neuro Re-Ed         Quad set 20x 5\"  20x 5\"  20x  5\" 20x  5\" 20x 5\"    Add sq 20x 5\"  20x 5\"  20x  5\" 20x  5\" 20x 5\"    Side stepping at rail Green band x10 R/L at rail  Green band x10 R/L at rail  Green band 10x R/L at rail Green band 10x R/L at rail Green band 10x R/L at rail    Tandem stance                                Ther Ex        Nustep L5 12' L5 12' L5  12' L5  12' L6 12'   Leg Press   10#  3x10 10#  3x10 10# 3x10   TR/HR 20x 20x 20x   20x 20x ea   Mini squat 2x10 2x10 2x10 2x10 2x10   LAQ 2# 2x10 Faheem 2# 2x10 faheem  2#  2x15 2#  2x15 2# 2x15   T-band HS curl Green 2x15 Faheem Green 2x15 faheem  Green 2x15 Green 2x15 Green 2x15    SAQ 2#  3x10 3\" Faheem 2#  3x10 3\" Bi 2#  2x15 faheem 2#  3x10 2# 3x10   SLR  1x10 2x10     St HS Curl        Supine clam shell Green 2x10 Green 2x10 Green 3x10 Green 2x10 Green 2x10   St hip 3 way March, abd, ext  2#  20x ea BLE March, abd, ext  2#  20x ea BLE March, Abd, Ext 2#  20x ea BLE March, abd, ext 2#  20x ea March, abd, ext 2# 20x ea                           HEP        Ther Activity                        Gait Training                        Modalities        CP MHP 10'  left knee MHP 10'  Left knee  MHP 10'  " left knee   MHP 10'

## 2025-04-11 ENCOUNTER — OFFICE VISIT (OUTPATIENT)
Dept: PHYSICAL THERAPY | Facility: CLINIC | Age: 85
End: 2025-04-11
Payer: MEDICARE

## 2025-04-11 DIAGNOSIS — M25.562 LEFT KNEE PAIN, UNSPECIFIED CHRONICITY: ICD-10-CM

## 2025-04-11 DIAGNOSIS — M17.12 PRIMARY OSTEOARTHRITIS OF LEFT KNEE: Primary | ICD-10-CM

## 2025-04-11 PROCEDURE — 97110 THERAPEUTIC EXERCISES: CPT

## 2025-04-11 PROCEDURE — 97112 NEUROMUSCULAR REEDUCATION: CPT

## 2025-04-11 NOTE — PROGRESS NOTES
"Daily Note     Today's date: 2025  Patient name: Lori Byrd  : 1940  MRN: [id number]  Referring provider: Peter Aceves DO  Dx:   Encounter Diagnosis     ICD-10-CM    1. Primary osteoarthritis of left knee  M17.12       2. Left knee pain, unspecified chronicity  M25.562                      Subjective: Lori reports soreness in L knee, thinks she \"pushed it\" this week by going up the stairs. Notes there is a difference between soreness and pain and she is experiencing soreness.       Objective: See treatment diary below      Assessment: Visual and VC to ensure correct exercise technique. Cues to decrease knee valgus during leg press with good follow through. Continues to have some difficulty with mini squats but overall improvement in technique. Pt would continue to benefit from skilled PT. Progress as able.       Plan: Continue with current POC to address pt deficits.      Precautions:  Hx Breast Cancer- In Remission       Manuals 4-1-25 4-4-25 4-8-25 4-11-25 3-28-25   Gentle PROM/ Stretch LLE                                Neuro Re-Ed         Quad set 20x 5\"  20x 5\"  20x  5\" 20x 5\"  20x 5\"    Add sq 20x 5\"  20x 5\"  20x  5\" 20x 5\"  20x 5\"    Side stepping at rail Green band x10 R/L at rail  Green band x10 R/L at rail  Green band 10x R/L at rail Green band 10x R/L at rail Green band 10x R/L at rail    Tandem stance                                        Ther Ex        Nustep L5 12' L5 12' L5  12' L5 12' L6 12'   Leg Press   10#  3x10 10# 3x10 10# 3x10   TR/HR 20x 20x 20x   30x ea 20x ea   Mini squat 2x10 2x10 2x10 2x10 2x10   LAQ 2# 2x10 Faheem 2# 2x10 faheem  2#  2x15 2# 2x15 2# 2x15   T-band HS curl Green 2x15 Faheem Green 2x15 faheem  Green 2x15 Green 2x15 Green 2x15    SAQ 2#  3x10 3\" Faheem 2#  3x10 3\" Bi 2#  2x15 faheem 2x15 faheem 2#  2# 3x10   SLR  1x10 2x10 2x10    St HS Curl        Supine clam shell Green 2x10 Green 2x10 Green 3x10 Green 3x10 Green 2x10   St hip 3 way March, abd, ext  2#  20x ea BLE March, " abd, ext  2#  20x ea BLE March, Abd, Ext 2#  20x ea BLE March, abd, ext 2# 20x ea BLE March, abd, ext 2# 20x ea                           HEP        Ther Activity                        Gait Training                        Modalities        CP MHP 10'  left knee MHP 10'  Left knee  MHP 10'  left knee  MHP 10' L knee  MHP 10'

## 2025-04-14 ENCOUNTER — OFFICE VISIT (OUTPATIENT)
Dept: PHYSICAL THERAPY | Facility: CLINIC | Age: 85
End: 2025-04-14
Attending: FAMILY MEDICINE
Payer: MEDICARE

## 2025-04-14 DIAGNOSIS — M17.12 PRIMARY OSTEOARTHRITIS OF LEFT KNEE: Primary | ICD-10-CM

## 2025-04-14 DIAGNOSIS — M25.562 LEFT KNEE PAIN, UNSPECIFIED CHRONICITY: ICD-10-CM

## 2025-04-14 PROCEDURE — 97112 NEUROMUSCULAR REEDUCATION: CPT

## 2025-04-14 PROCEDURE — 97110 THERAPEUTIC EXERCISES: CPT

## 2025-04-14 NOTE — PROGRESS NOTES
"Daily Note     Today's date: 2025  Patient name: Lori Byrd  : 1940  MRN: [id number]  Referring provider: Peter Aceves DO  Dx:   Encounter Diagnosis     ICD-10-CM    1. Primary osteoarthritis of left knee  M17.12       2. Left knee pain, unspecified chronicity  M25.562                      Subjective: Lori reports still experiencing pain to posterior L knee in popliteal space but reports less pain overall since injection.       Objective: See treatment diary below      Assessment: Visual and VC to ensure correct exercise technique. Cues to avoid knee valgus with mini squats and leg press with good follow through. Muscular fatigue to anterior thigh following SLR flexion. Pt would continue to benefit from skilled PT. Progress as able.       Plan: Continue with current POC to address pt deficits.      Precautions:  Hx Breast Cancer- In Remission       Manuals 25   Gentle PROM/ Stretch LLE                                Neuro Re-Ed         Quad set 20x 5\"  20x 5\"  20x  5\" 20x 5\"  20x 5\"    Add sq 20x 5\"  20x 5\"  20x  5\" 20x 5\"  20X 5\"    Side stepping at rail Green band x10 R/L at rail  Green band x10 R/L at rail  Green band 10x R/L at rail Green band 10x R/L at rail Green band 10x R/L at rail   Tandem stance                                        Ther Ex        Nustep L5 12' L5 12' L5  12' L5 12' L5 12'   Leg Press   10#  3x10 10# 3x10 10# 3x10   TR/HR 20x 20x 20x   30x ea 30x ea2x10   Mini squat 2x10 2x10 2x10 2x10 2x10   LAQ 2# 2x10 Faheem 2# 2x10 faheem  2#  2x15 2# 2x15 2# 2x15   T-band HS curl Green 2x15 Faheem Green 2x15 faheem  Green 2x15 Green 2x15 Green 2x15   SAQ 2#  3x10 3\" Faheem 2#  3x10 3\" Bi 2#  2x15 faheem 2x15 faheem 2#  2x15 faheem 2#    SLR  1x10 2x10 2x10 2x10   St HS Curl        Supine clam shell Green 2x10 Green 2x10 Green 3x10 Green 3x10 Green 3x10   St hip 3 way March, abd, ext  2#  20x ea BLE March, abd, ext  2#  20x ea BLE March, Abd, Ext 2#  20x ea BLE March, " abd, ext 2# 20x ea BLE March, abd, ext 2# 20x ea BLE                           HEP        Ther Activity                        Gait Training                        Modalities        CP MHP 10'  left knee MHP 10'  Left knee  MHP 10'  left knee  MHP 10' L knee  MHP 10' posterior L knee

## 2025-04-15 DIAGNOSIS — E78.5 HYPERLIPIDEMIA, UNSPECIFIED HYPERLIPIDEMIA TYPE: ICD-10-CM

## 2025-04-16 RX ORDER — ATORVASTATIN CALCIUM 20 MG/1
20 TABLET, FILM COATED ORAL EVERY MORNING
Qty: 90 TABLET | Refills: 1 | Status: SHIPPED | OUTPATIENT
Start: 2025-04-16

## 2025-04-18 ENCOUNTER — OFFICE VISIT (OUTPATIENT)
Dept: PHYSICAL THERAPY | Facility: CLINIC | Age: 85
End: 2025-04-18
Attending: FAMILY MEDICINE
Payer: MEDICARE

## 2025-04-18 DIAGNOSIS — M25.562 LEFT KNEE PAIN, UNSPECIFIED CHRONICITY: ICD-10-CM

## 2025-04-18 DIAGNOSIS — M17.12 PRIMARY OSTEOARTHRITIS OF LEFT KNEE: Primary | ICD-10-CM

## 2025-04-18 PROCEDURE — 97110 THERAPEUTIC EXERCISES: CPT

## 2025-04-18 PROCEDURE — 97112 NEUROMUSCULAR REEDUCATION: CPT

## 2025-04-18 NOTE — PROGRESS NOTES
"Daily Note     Today's date: 2025  Patient name: Lori Byrd  : 1940  MRN: [id number]  Referring provider: Peter Aceves DO  Dx:   Encounter Diagnosis     ICD-10-CM    1. Primary osteoarthritis of left knee  M17.12       2. Left knee pain, unspecified chronicity  M25.562           Start Time: 0821          Subjective: Lori reports an overall improvement in L knee but still has discomfort to popliteal fossa.       Objective: See treatment diary below      Assessment: Added calf stretch to improve flexibility in gastroc and decrease discomfort in popliteal space, no increases in pain reported. Visual and VC to ensure correct exercise technique. Improvement in ability to avoid excessive L knee valgus during squats and leg press. Pt would continue to benefit from skilled PT.       Plan: Continue with current POC to address pt deficits.      Precautions:  Hx Breast Cancer- In Remission       Manuals 25   Gentle PROM/ Stretch LLE                                Neuro Re-Ed         Quad set 20x 5\"  20x 5\"  20x  5\" 20x 5\"  20x 5\"    Add sq 20x 5\"  20x 5\"  20x  5\" 20x 5\"  20X 5\"    Side stepping at rail Green band 10x R/L at rail  Green band x10 R/L at rail  Green band 10x R/L at rail Green band 10x R/L at rail Green band 10x R/L at rail   Tandem stance                                        Ther Ex        Nustep L5 12' L5 12' L5  12' L5 12' L5 12'   Leg Press 10# 3x10  10#  3x10 10# 3x10 10# 3x10   TR/HR 30x ea 20x 20x   30x ea 30x ea2x10   Mini squat  2x10 2x10 2x10 2x10 2x10   LAQ 2.5# 2x10 2# 2x10 fadumo  2#  2x15 2# 2x15 2# 2x15   T-band HS curl Green 2x15 Green 2x15 fadumo  Green 2x15 Green 2x15 Green 2x15   SAQ 2x10 fadumo 2.5#  2#  3x10 3\" Bi 2#  2x15 fadumo 2x15 fadumo 2#  2x15 fadumo 2#    SLR 2x10 1x10 2x10 2x10 2x10   St HS Curl        Supine clam shell Green 3x10 Green 2x10 Green 3x10 Green 3x10 Green 3x10   St hip 3 way March, abd, ext 2.5# 20x ea fadumo  March, abd, ext  2#  " "20x ea BLE March, Abd, Ext 2#  20x ea BLE March, abd, ext 2# 20x ea BLE March, abd, ext 2# 20x ea BLE   Calf stretch with strap  15\"x5                       HEP        Ther Activity                        Gait Training                        Modalities        CP MHP 10' posterior L knee  MHP 10'  Left knee  MHP 10'  left knee  MHP 10' L knee  MHP 10' posterior L knee                                                               "

## 2025-04-21 ENCOUNTER — OFFICE VISIT (OUTPATIENT)
Dept: PHYSICAL THERAPY | Facility: CLINIC | Age: 85
End: 2025-04-21
Payer: MEDICARE

## 2025-04-21 DIAGNOSIS — M25.562 LEFT KNEE PAIN, UNSPECIFIED CHRONICITY: ICD-10-CM

## 2025-04-21 DIAGNOSIS — M17.12 PRIMARY OSTEOARTHRITIS OF LEFT KNEE: Primary | ICD-10-CM

## 2025-04-21 PROCEDURE — 97112 NEUROMUSCULAR REEDUCATION: CPT | Performed by: PHYSICAL THERAPIST

## 2025-04-21 PROCEDURE — 97110 THERAPEUTIC EXERCISES: CPT | Performed by: PHYSICAL THERAPIST

## 2025-04-21 NOTE — PROGRESS NOTES
"Daily Note     Today's date: 2025  Patient name: Lori Byrd  : 1940  MRN: [id number]  Referring provider: Peter Aceves DO  Dx:   Encounter Diagnosis     ICD-10-CM    1. Primary osteoarthritis of left knee  M17.12       2. Left knee pain, unspecified chronicity  M25.562                      Subjective:   I'm doing ok      Objective: See treatment diary below      Assessment: Tolerated treatment well.   Reports knee did well with increased activity over the weekend.  Soreness noted today lateral aspect of knee.  Minimal cracking noted.  Patient would benefit from continued PT      Plan: Continue per plan of care.      Precautions:  Hx Breast Cancer- In Remission       Manuals 25   Gentle PROM/ Stretch LLE                                Neuro Re-Ed         Quad set 20x 5\"  20x 5\"  20x  5\" 20x 5\"  20x 5\"    Add sq 20x 5\"  20x 5\"  20x  5\" 20x 5\"  20X 5\"    Side stepping at rail Green band 10x R/L at rail  Green band x10 R/L at rail  Green band 10x R/L at rail Green band 10x R/L at rail Green band 10x R/L at rail   Tandem stance                                        Ther Ex        Nustep L5 12' L5 12' L5  12' L5 12' L5 12'   Leg Press 10# 3x10 10#  3x10 10#  3x10 10# 3x10 10# 3x10   TR/HR 30x ea 20x 20x   30x ea 30x ea2x10   Mini squat  2x10 2x10 2x10 2x10 2x10   LAQ 2.5# 2x10 2# 2x15 fadumo  2#  2x15 2# 2x15 2# 2x15   T-band HS curl Green 2x15 Green 2x15 fadumo  Green 2x15 Green 2x15 Green 2x15   SAQ 2x10 fadumo 2.5#  2#  3x10 3\" Bi 2#  2x15 fadumo 2x15 fadumo 2#  2x15 fadumo 2#    SLR 2x10 2x10 2x10 2x10 2x10   St HS Curl        Supine clam shell Green 3x10 Green 2x10 Green 3x10 Green 3x10 Green 3x10   St hip 3 way March, abd, ext 2.5# 20x ea fadumo  March, abd, ext  2#  20x ea BLE March, Abd, Ext 2#  20x ea BLE March, abd, ext 2# 20x ea BLE March, abd, ext 2# 20x ea BLE   Calf stretch with strap  15\"x5 5x  15\"  fadumo  supine                      HEP        Ther Activity              "           Gait Training                        Modalities        CP MHP 10' posterior L knee  MHP 10'  Left knee  MHP 10'  left knee  MHP 10' L knee  MHP 10' posterior L knee

## 2025-04-25 ENCOUNTER — APPOINTMENT (OUTPATIENT)
Dept: LAB | Facility: CLINIC | Age: 85
End: 2025-04-25
Payer: MEDICARE

## 2025-04-25 ENCOUNTER — OFFICE VISIT (OUTPATIENT)
Dept: PHYSICAL THERAPY | Facility: CLINIC | Age: 85
End: 2025-04-25
Payer: MEDICARE

## 2025-04-25 DIAGNOSIS — R73.01 IMPAIRED FASTING GLUCOSE: ICD-10-CM

## 2025-04-25 DIAGNOSIS — M25.562 LEFT KNEE PAIN, UNSPECIFIED CHRONICITY: ICD-10-CM

## 2025-04-25 DIAGNOSIS — E78.5 HYPERLIPIDEMIA, UNSPECIFIED HYPERLIPIDEMIA TYPE: ICD-10-CM

## 2025-04-25 DIAGNOSIS — E78.5 HYPERLIPIDEMIA, UNSPECIFIED HYPERLIPIDEMIA TYPE: Primary | ICD-10-CM

## 2025-04-25 DIAGNOSIS — D64.9 ANEMIA, UNSPECIFIED TYPE: ICD-10-CM

## 2025-04-25 DIAGNOSIS — M17.12 PRIMARY OSTEOARTHRITIS OF LEFT KNEE: Primary | ICD-10-CM

## 2025-04-25 LAB
ALBUMIN SERPL BCG-MCNC: 4 G/DL (ref 3.5–5)
ALP SERPL-CCNC: 59 U/L (ref 34–104)
ALT SERPL W P-5'-P-CCNC: 13 U/L (ref 7–52)
ANION GAP SERPL CALCULATED.3IONS-SCNC: 7 MMOL/L (ref 4–13)
AST SERPL W P-5'-P-CCNC: 17 U/L (ref 13–39)
BASOPHILS # BLD AUTO: 0.05 THOUSANDS/ÂΜL (ref 0–0.1)
BASOPHILS NFR BLD AUTO: 1 % (ref 0–1)
BILIRUB SERPL-MCNC: 0.45 MG/DL (ref 0.2–1)
BUN SERPL-MCNC: 20 MG/DL (ref 5–25)
CALCIUM SERPL-MCNC: 9.7 MG/DL (ref 8.4–10.2)
CHLORIDE SERPL-SCNC: 100 MMOL/L (ref 96–108)
CHOLEST SERPL-MCNC: 163 MG/DL (ref ?–200)
CO2 SERPL-SCNC: 33 MMOL/L (ref 21–32)
CREAT SERPL-MCNC: 0.7 MG/DL (ref 0.6–1.3)
EOSINOPHIL # BLD AUTO: 0.21 THOUSAND/ÂΜL (ref 0–0.61)
EOSINOPHIL NFR BLD AUTO: 2 % (ref 0–6)
ERYTHROCYTE [DISTWIDTH] IN BLOOD BY AUTOMATED COUNT: 13.6 % (ref 11.6–15.1)
EST. AVERAGE GLUCOSE BLD GHB EST-MCNC: 134 MG/DL
GFR SERPL CREATININE-BSD FRML MDRD: 79 ML/MIN/1.73SQ M
GLUCOSE P FAST SERPL-MCNC: 97 MG/DL (ref 65–99)
HBA1C MFR BLD: 6.3 %
HCT VFR BLD AUTO: 37.2 % (ref 34.8–46.1)
HDLC SERPL-MCNC: 57 MG/DL
HGB BLD-MCNC: 11.9 G/DL (ref 11.5–15.4)
IMM GRANULOCYTES # BLD AUTO: 0.05 THOUSAND/UL (ref 0–0.2)
IMM GRANULOCYTES NFR BLD AUTO: 1 % (ref 0–2)
LDLC SERPL CALC-MCNC: 90 MG/DL (ref 0–100)
LYMPHOCYTES # BLD AUTO: 1.89 THOUSANDS/ÂΜL (ref 0.6–4.47)
LYMPHOCYTES NFR BLD AUTO: 22 % (ref 14–44)
MCH RBC QN AUTO: 28.8 PG (ref 26.8–34.3)
MCHC RBC AUTO-ENTMCNC: 32 G/DL (ref 31.4–37.4)
MCV RBC AUTO: 90 FL (ref 82–98)
MONOCYTES # BLD AUTO: 0.95 THOUSAND/ÂΜL (ref 0.17–1.22)
MONOCYTES NFR BLD AUTO: 11 % (ref 4–12)
NEUTROPHILS # BLD AUTO: 5.58 THOUSANDS/ÂΜL (ref 1.85–7.62)
NEUTS SEG NFR BLD AUTO: 63 % (ref 43–75)
NONHDLC SERPL-MCNC: 106 MG/DL
NRBC BLD AUTO-RTO: 0 /100 WBCS
PLATELET # BLD AUTO: 188 THOUSANDS/UL (ref 149–390)
PMV BLD AUTO: 10.6 FL (ref 8.9–12.7)
POTASSIUM SERPL-SCNC: 3.9 MMOL/L (ref 3.5–5.3)
PROT SERPL-MCNC: 6.7 G/DL (ref 6.4–8.4)
RBC # BLD AUTO: 4.13 MILLION/UL (ref 3.81–5.12)
SODIUM SERPL-SCNC: 140 MMOL/L (ref 135–147)
TRIGL SERPL-MCNC: 81 MG/DL (ref ?–150)
WBC # BLD AUTO: 8.73 THOUSAND/UL (ref 4.31–10.16)

## 2025-04-25 PROCEDURE — 80053 COMPREHEN METABOLIC PANEL: CPT

## 2025-04-25 PROCEDURE — 83036 HEMOGLOBIN GLYCOSYLATED A1C: CPT

## 2025-04-25 PROCEDURE — 80061 LIPID PANEL: CPT

## 2025-04-25 PROCEDURE — 97112 NEUROMUSCULAR REEDUCATION: CPT | Performed by: PHYSICAL THERAPIST

## 2025-04-25 PROCEDURE — 36415 COLL VENOUS BLD VENIPUNCTURE: CPT

## 2025-04-25 PROCEDURE — 85025 COMPLETE CBC W/AUTO DIFF WBC: CPT

## 2025-04-25 PROCEDURE — 97110 THERAPEUTIC EXERCISES: CPT | Performed by: PHYSICAL THERAPIST

## 2025-04-25 NOTE — PROGRESS NOTES
"Daily Note     Today's date: 2025  Patient name: Lori Byrd  : 1940  MRN: [id number]  Referring provider: Peter Aceves DO  Dx:   Encounter Diagnosis     ICD-10-CM    1. Primary osteoarthritis of left knee  M17.12       2. Left knee pain, unspecified chronicity  M25.562                      Subjective:   I had a very good day yesterday      Objective: See treatment diary below      Assessment: Tolerated treatment well.  Reports knee felt good yesterday and was able to do a lot at home.  Minimal soreness lateral knee today from higher activity yesterday.  Pain with SLR posterior/lateral knee.  Patient would benefit from continued PT      Plan: Continue per plan of care.      Precautions:  Hx Breast Cancer- In Remission       Manuals 25   Gentle PROM/ Stretch LLE                                Neuro Re-Ed         Quad set 20x 5\"  20x 5\"  20x  5\" 20x 5\"  20x 5\"    Add sq 20x 5\"  20x 5\"  20x  5\" 20x 5\"  20X 5\"    Side stepping at rail Green band 10x R/L at rail  Green band x10 R/L at rail  Green band 10x R/L at rail Green band 10x R/L at rail Green band 10x R/L at rail   Tandem stance                                        Ther Ex        Nustep L5 12' L5 12' L5  12' L5 12' L5 12'   Leg Press 10# 3x10 10#  3x10 10#  3x10 10# 3x10 10# 3x10   TR/HR 30x ea 20x 20x   30x ea 30x ea2x10   Mini squat  2x10 2x10 2x10 2x10 2x10   LAQ 2.5# 2x10 2# 2x15 fadumo  2#  2x15 2# 2x15 2# 2x15   T-band HS curl Green 2x15 Green 2x15 fadumo  Green 2x15 Green 2x15 Green 2x15   SAQ 2x10 fadumo 2.5#  2#  3x10 3\" Bi 2#  2x15 fadumo 2x15 fadumo 2#  2x15 fadumo 2#    SLR 2x10 2x10 2x10 2x10 2x10   St HS Curl        Supine clam shell Green 3x10 Green 2x10 Green 3x10 Green 3x10 Green 3x10   St hip 3 way March, abd, ext 2.5# 20x ea fadumo  March, abd, ext  2#  20x ea BLE March, Abd, Ext 2#  20x ea BLE March, abd, ext 2# 20x ea BLE March, abd, ext 2# 20x ea BLE   Calf stretch with strap  15\"x5 5x  15\"  faudmo  supine " "5x  15\"  supine                     HEP        Ther Activity                        Gait Training                        Modalities        CP MHP 10' posterior L knee  MHP 10'  Left knee  Declined  MHP 10' L knee  MHP 10' posterior L knee                                                                   "

## 2025-04-27 ENCOUNTER — RESULTS FOLLOW-UP (OUTPATIENT)
Dept: FAMILY MEDICINE CLINIC | Facility: CLINIC | Age: 85
End: 2025-04-27

## 2025-04-28 ENCOUNTER — OFFICE VISIT (OUTPATIENT)
Dept: PHYSICAL THERAPY | Facility: CLINIC | Age: 85
End: 2025-04-28
Payer: MEDICARE

## 2025-04-28 DIAGNOSIS — M17.12 PRIMARY OSTEOARTHRITIS OF LEFT KNEE: Primary | ICD-10-CM

## 2025-04-28 DIAGNOSIS — M25.562 LEFT KNEE PAIN, UNSPECIFIED CHRONICITY: ICD-10-CM

## 2025-04-28 PROCEDURE — 97110 THERAPEUTIC EXERCISES: CPT | Performed by: PHYSICAL THERAPIST

## 2025-04-28 PROCEDURE — 97112 NEUROMUSCULAR REEDUCATION: CPT | Performed by: PHYSICAL THERAPIST

## 2025-04-28 NOTE — PROGRESS NOTES
PT RE-EVALUATION     Today's date: 2025  Patient name: Lori Byrd  : 1940  MRN: [id number]  Referring provider: Peter Aceves DO  Dx:   Encounter Diagnosis     ICD-10-CM    1. Primary osteoarthritis of left knee  M17.12       2. Left knee pain, unspecified chronicity  M25.562                        Assessment  Impairments: abnormal gait, abnormal or restricted ROM, activity intolerance, impaired physical strength, lacks appropriate home exercise program, pain with function and activity limitations    Assessment details: Pt presents with left knee pain.  Onset approx 3-4 months ago when foot slipped out of peddle while riding bike.  Has had worsening symptoms.  Decreased strength and Rom of knee noted.  Increased swelling noted knee and lower leg.  Gait antalgic.  Genu Valgus deformity left knee.  Pain increases with twisting/pivoting LLE. X-ray revealed degenerative changes.  Will benefit from PT tx to decrease symptoms and restore normal functional level.      25:  Pt showing improvement in pain but still showing signs of weakness and some balance issues.    25:  Pt reports continued varied pain left knee.  Symptoms primarily lateral aspect.  Limited gait/ADL's due to symptoms.  Non-specific movement triggers pain.  Vagus deformity noted.  Recently obtained neoprene brace which helps swelling and helps with stability.  Improved strength however continued deficits remain.  Crepitus noted left knee with movement. Reports Ortho MD follow up next month.  Will benefit from continued PT tx to decrease symptoms and improve functional level.      3-25-25:  Pt reports continued varied symptoms left knee.  Pain primarily lateral aspect.  Pain varies with activity.  Using multiple braces left knee.  Intermittent sharp pain noted.  Gait minimally antalgic.  Has Ortho MD appt upcoming.  Will continue per MD instructions.      25:  Pt continues to note improvement left knee since cortisone  injection.  Continued lower level pain lateral knee.  Pain with prolonged standing/walking or with twisting movement.  No instability noted.  Performing all ADL's.  Pt to continue 1-2 week then transition to HEP.       Prognosis: good    Goals  ST. Decrease pain 50% 6 wk    2.  Increase Knee ROM to 0-120 degrees 6 wk.   3.  Increase knee strength to 4/5 6 wk.   4.  Pt will demonstrate normal gait pattern on level surfaces 6 wk   5.  Pt will report no difficulty with light ADL's 6 wk  (/met/partially met)  LT.  Pt will report no pain 12 wk  2.  Increase Knee ROM to WNL 12 wk   3. Increase knee strength to WNL 12 wk   4.  Pt will report no limitations with ambulation 12 wk   5.  Pt will report no limitations with ADL's 12 wk (partially met/not met)    Plan  Patient would benefit from: skilled physical therapy and PT eval  Planned modality interventions: cryotherapy and thermotherapy: hydrocollator packs    Planned therapy interventions: joint mobilization, manual therapy, neuromuscular re-education, balance, self care, strengthening, stretching, therapeutic activities, therapeutic exercise, functional ROM exercises, flexibility and home exercise program    Frequency: 3x week  Duration in weeks: 12  Treatment plan discussed with: patient        Subjective Evaluation    History of Present Illness  Mechanism of injury: Pt presents with left knee pain.  Reports 3-4 months ago riding recumbent bike and foot slipped of peddle and struck lower leg and felt she pulled a muscle in back of leg.  Has had increasing pain.  Walking becoming difficult.  No instability noted in knee.  Painful clicking at times.  Swelling noted.  Increased difficulty moving sit to stand.  No issues with left knee prior.  Tried volteran cream and tylenol.  X-ray revealed arthritic changes.  Pain primarily posterior knee and lateral knee.      25:  Pt returns to PT after being away for a month.  Pt reports she does notice her knee is  "getting better and has less swelling.  She states that putting pressure/ compression on the area helps with pain.  Patient Goals  Patient goals for therapy: decreased pain and increased strength  Patient goal: Get rid of the pain and go back to exercise  Pain  Current pain ratin  At best pain ratin  At worst pain ratin  Location: Left Knee (lateral aspect)  Quality: sharp and dull ache  Relieving factors: ice, rest and heat  Aggravating factors: walking and standing (certain movements (twisting))  Progression: improved    Exercise history: Normally walks 1-2 miles a few days a week.  Bikes daily prior    Treatments  Current treatment: physical therapy        Objective     Observations     Additional Observation Details  Genu Valgus deformity      Tenderness   Left Knee   Tenderness in the lateral joint line, LCL (distal) and LCL (proximal).     Active Range of Motion   Left Knee   Flexion: 130 degrees   Extension: 2 degrees   Extensor la degrees     Mobility   Patellar Mobility:   Left Knee   WFL: medial, lateral, superior and inferior.     Strength/Myotome Testing     Left Knee   Flexion: 4  Extension: 4    Right Knee   Flexion: 4  Extension: 4    Ambulation     Ambulation: Stairs   Ascend stairs: independent  Pattern: reciprocal  Railings: two rails  Descend stairs: independent  Pattern: reciprocal  Railings: two rails    Additional Stairs Ambulation Details  Has to perform in non-reciprocal manner without handrail     Observational Gait   Gait: within functional limits   Decreased walking speed, stride length and left stance time.     Additional Observational Gait Details  Valgus deformity noted left knee       Precautions:  Hx Breast Cancer- In Remission       Manuals 25   Gentle PROM/ Stretch LLE                                Neuro Re-Ed         Quad set 20x 5\"  20x 5\"  20x  5\" 20x 5\"  20x 5\"    Add sq 20x 5\"  20x 5\"  20x  5\" 20x 5\"  20X 5\"    Side stepping " "at rail Green band 10x R/L at rail  Green band x10 R/L at rail  Green band 10x R/L at rail Green band 10x R/L at rail Green band 10x R/L at rail   Tandem stance                                        Ther Ex        Nustep L5 12' L5 12' L5  12' L5 12' L5 12'   Leg Press 10# 3x10 10#  3x10 10#  3x10 10# 3x10 10# 3x10   TR/HR 30x ea 20x 20x   30x ea 30x ea2x10   Mini squat  2x10 2x10 2x10 2x10 2x10   LAQ 2.5# 2x10 2# 2x15 fadumo  2#  2x15 2# 2x15 2# 2x15   T-band HS curl Green 2x15 Green 2x15 fadumo  Green 2x15 Green 2x15 Green 2x15   SAQ 2x10 fadumo 2.5#  2#  3x10 3\" Bi 2#  2x15 fadumo 2x15 fadumo 2#  2x15 fadumo 2#    SLR 2x10 2x10 2x10 2x10 2x10   St HS Curl        Supine clam shell Green 3x10 Green 2x10 Green 3x10 Green 3x10 Green 3x10   St hip 3 way March, abd, ext 2.5# 20x ea fadumo  March, abd, ext  2#  20x ea BLE March, Abd, Ext 2#  20x ea BLE March, abd, ext 2# 20x ea BLE March, abd, ext 2# 20x ea BLE   Calf stretch with strap  15\"x5 5x  15\"  fadumo  supine 5x  15\"  supine 5x 15\"  supine BLE                    HEP        Ther Activity                        Gait Training                        Modalities        CP MHP 10' posterior L knee  MHP 10'  Left knee  Declined  MHP 10' L knee  MHP 10' posterior L knee                                                            "

## 2025-05-02 ENCOUNTER — APPOINTMENT (OUTPATIENT)
Dept: PHYSICAL THERAPY | Facility: CLINIC | Age: 85
End: 2025-05-02
Payer: MEDICARE

## 2025-05-06 ENCOUNTER — OFFICE VISIT (OUTPATIENT)
Dept: PHYSICAL THERAPY | Facility: CLINIC | Age: 85
End: 2025-05-06
Attending: FAMILY MEDICINE
Payer: MEDICARE

## 2025-05-06 DIAGNOSIS — M25.562 LEFT KNEE PAIN, UNSPECIFIED CHRONICITY: ICD-10-CM

## 2025-05-06 DIAGNOSIS — M17.12 PRIMARY OSTEOARTHRITIS OF LEFT KNEE: Primary | ICD-10-CM

## 2025-05-06 PROCEDURE — 97110 THERAPEUTIC EXERCISES: CPT | Performed by: PHYSICAL THERAPIST

## 2025-05-06 PROCEDURE — 97112 NEUROMUSCULAR REEDUCATION: CPT | Performed by: PHYSICAL THERAPIST

## 2025-05-06 NOTE — PROGRESS NOTES
"Daily Note     Today's date: 2025  Patient name: Lori Byrd  : 1940  MRN: [id number]  Referring provider: Peter Aceves DO  Dx:   Encounter Diagnosis     ICD-10-CM    1. Primary osteoarthritis of left knee  M17.12       2. Left knee pain, unspecified chronicity  M25.562                      Subjective:   The knee is sore today.  I did a lot over the weekend.        Objective: See treatment diary below      Assessment: Tolerated treatment well.   Pain noted from higher activity the past weekend.  Prolonged standing/walking noted.  Pt to transition to independent exercise program after today.  Continued overall improvement with PT and injection to knee.        Plan:   D/C PT services       Precautions:  Hx Breast Cancer- In Remission       Manuals 25   Gentle PROM/ Stretch LLE                                Neuro Re-Ed         Quad set 20x 5\"  20x 5\"  20x  5\" 20x 5\"  20x 5\"    Add sq 20x 5\"  20x 5\"  20x  5\" 20x 5\"  20X 5\"    Side stepping at rail Green band 10x R/L at rail  Green band x10 R/L at rail  Green band 10x R/L at rail Green band 10x R/L at rail    Tandem stance                                        Ther Ex        Nustep L5 12' L5 12' L5  12' L5 12' L5 12'   Leg Press 10# 3x10 10#  3x10 10#  3x10 10# 3x10 10# 3x10   TR/HR 30x ea 20x 20x   30x ea 30x ea2x10   Mini squat  2x10 2x10 2x10 2x10    LAQ 2.5# 2x10 2# 2x15 fadumo  2#  2x15 2# 2x15 2# 2x15   T-band HS curl Green 2x15 Green 2x15 fadumo  Green 2x15 Green 2x15 Green 2x15   SAQ 2x10 fadumo 2.5#  2#  3x10 3\" Bi 2#  2x15 fadumo 2x15 fadumo 2#  2x15 fadumo 2#    SLR 2x10 2x10 2x10 2x10 2x10   St HS Curl        Supine clam shell Green 3x10 Green 2x10 Green 3x10 Green 3x10 Green 3x10   St hip 3 way March, abd, ext 2.5# 20x ea fadumo  March, abd, ext  2#  20x ea BLE March, Abd, Ext 2#  20x ea BLE March, abd, ext 2# 20x ea BLE March, abd, ext 2# 20x ea BLE   Calf stretch with strap  15\"x5 5x  15\"  fadumo  supine 5x  15\"  supine 5x " "15\"  supine BLE 5x  15\"  supine BLE                   HEP        Ther Activity                        Gait Training                        Modalities        CP MHP 10' posterior L knee  MHP 10'  Left knee  Declined  MHP 10' L knee  MHP 10' posterior L knee                                                               "

## 2025-05-23 DIAGNOSIS — E78.5 HYPERLIPIDEMIA, UNSPECIFIED HYPERLIPIDEMIA TYPE: ICD-10-CM

## 2025-05-23 DIAGNOSIS — I10 ESSENTIAL HYPERTENSION: ICD-10-CM

## 2025-05-23 RX ORDER — DORZOLAMIDE HYDROCHLORIDE AND TIMOLOL MALEATE 20; 5 MG/ML; MG/ML
SOLUTION/ DROPS OPHTHALMIC
Status: CANCELLED | OUTPATIENT
Start: 2025-05-23

## 2025-05-23 RX ORDER — ATORVASTATIN CALCIUM 20 MG/1
20 TABLET, FILM COATED ORAL EVERY MORNING
Qty: 90 TABLET | Refills: 1 | Status: CANCELLED | OUTPATIENT
Start: 2025-05-23

## 2025-05-23 RX ORDER — HYDROCHLOROTHIAZIDE 12.5 MG/1
12.5 TABLET ORAL EVERY MORNING
Qty: 90 TABLET | Refills: 1 | OUTPATIENT
Start: 2025-05-23

## 2025-05-23 RX ORDER — PRENATAL 168/IRON/FOLIC/OMEGA3 27-800-235
CAPSULE ORAL
Status: CANCELLED | OUTPATIENT
Start: 2025-05-23

## 2025-05-23 RX ORDER — RAMIPRIL 10 MG/1
10 CAPSULE ORAL EVERY MORNING
Qty: 90 CAPSULE | Refills: 1 | Status: CANCELLED | OUTPATIENT
Start: 2025-05-23

## 2025-05-23 RX ORDER — HYDROCHLOROTHIAZIDE 12.5 MG/1
12.5 TABLET ORAL EVERY MORNING
Qty: 90 TABLET | Refills: 1 | Status: CANCELLED | OUTPATIENT
Start: 2025-05-23

## 2025-05-23 NOTE — TELEPHONE ENCOUNTER
Patient called, due to [facility] closing she would like all medication to be sent to [facility].    Chart updated.   Please advise, thank you.

## 2025-05-27 DIAGNOSIS — H40.059 RAISED INTRAOCULAR PRESSURE, UNSPECIFIED LATERALITY: ICD-10-CM

## 2025-05-27 DIAGNOSIS — E28.39 MENOPAUSE OVARIAN FAILURE: Primary | ICD-10-CM

## 2025-05-27 DIAGNOSIS — I10 ESSENTIAL HYPERTENSION: ICD-10-CM

## 2025-05-27 DIAGNOSIS — E78.5 HYPERLIPIDEMIA, UNSPECIFIED HYPERLIPIDEMIA TYPE: ICD-10-CM

## 2025-05-27 RX ORDER — DORZOLAMIDE HYDROCHLORIDE AND TIMOLOL MALEATE 20; 5 MG/ML; MG/ML
1 SOLUTION/ DROPS OPHTHALMIC EVERY 12 HOURS
Start: 2025-05-27

## 2025-05-27 RX ORDER — RAMIPRIL 10 MG/1
10 CAPSULE ORAL EVERY MORNING
Qty: 90 CAPSULE | Refills: 1 | Status: SHIPPED | OUTPATIENT
Start: 2025-05-27

## 2025-05-27 RX ORDER — HYDROCHLOROTHIAZIDE 12.5 MG/1
12.5 TABLET ORAL EVERY MORNING
Qty: 90 TABLET | Refills: 1 | Status: SHIPPED | OUTPATIENT
Start: 2025-05-27

## 2025-05-27 RX ORDER — PRENATAL 168/IRON/FOLIC/OMEGA3 27-800-235
1 CAPSULE ORAL DAILY
Qty: 90 TABLET | Refills: 3 | Status: SHIPPED | OUTPATIENT
Start: 2025-05-27

## 2025-05-27 RX ORDER — ATORVASTATIN CALCIUM 20 MG/1
20 TABLET, FILM COATED ORAL EVERY MORNING
Qty: 90 TABLET | Refills: 1 | Status: SHIPPED | OUTPATIENT
Start: 2025-05-27

## 2025-05-27 RX ORDER — ANASTROZOLE 1 MG/1
1 TABLET ORAL DAILY
OUTPATIENT
Start: 2025-05-27

## 2025-08-04 ENCOUNTER — OFFICE VISIT (OUTPATIENT)
Dept: FAMILY MEDICINE CLINIC | Facility: CLINIC | Age: 85
End: 2025-08-04
Payer: MEDICARE

## 2025-08-04 VITALS
HEART RATE: 63 BPM | DIASTOLIC BLOOD PRESSURE: 88 MMHG | BODY MASS INDEX: 29.44 KG/M2 | WEIGHT: 166.13 LBS | SYSTOLIC BLOOD PRESSURE: 120 MMHG | TEMPERATURE: 98 F | OXYGEN SATURATION: 97 % | HEIGHT: 63 IN

## 2025-08-04 DIAGNOSIS — E78.5 DYSLIPIDEMIA, GOAL LDL BELOW 130: ICD-10-CM

## 2025-08-04 DIAGNOSIS — Z85.828 HISTORY OF BASAL CELL CANCER: ICD-10-CM

## 2025-08-04 DIAGNOSIS — K59.02 CONSTIPATION DUE TO OUTLET DYSFUNCTION: ICD-10-CM

## 2025-08-04 DIAGNOSIS — I10 ESSENTIAL HYPERTENSION: Primary | ICD-10-CM

## 2025-08-04 DIAGNOSIS — Z17.0 MALIGNANT NEOPLASM OF UPPER-OUTER QUADRANT OF RIGHT BREAST IN FEMALE, ESTROGEN RECEPTOR POSITIVE (HCC): ICD-10-CM

## 2025-08-04 DIAGNOSIS — C50.411 MALIGNANT NEOPLASM OF UPPER-OUTER QUADRANT OF RIGHT BREAST IN FEMALE, ESTROGEN RECEPTOR POSITIVE (HCC): ICD-10-CM

## 2025-08-04 DIAGNOSIS — R73.03 PREDIABETES: ICD-10-CM

## 2025-08-04 PROCEDURE — 99213 OFFICE O/P EST LOW 20 MIN: CPT | Performed by: STUDENT IN AN ORGANIZED HEALTH CARE EDUCATION/TRAINING PROGRAM

## 2025-08-04 PROCEDURE — G2211 COMPLEX E/M VISIT ADD ON: HCPCS | Performed by: STUDENT IN AN ORGANIZED HEALTH CARE EDUCATION/TRAINING PROGRAM

## 2025-08-20 ENCOUNTER — OFFICE VISIT (OUTPATIENT)
Age: 85
End: 2025-08-20
Attending: STUDENT IN AN ORGANIZED HEALTH CARE EDUCATION/TRAINING PROGRAM
Payer: MEDICARE

## 2025-08-20 VITALS
DIASTOLIC BLOOD PRESSURE: 87 MMHG | OXYGEN SATURATION: 97 % | WEIGHT: 166 LBS | HEART RATE: 83 BPM | SYSTOLIC BLOOD PRESSURE: 149 MMHG | BODY MASS INDEX: 29.41 KG/M2 | HEIGHT: 63 IN

## 2025-08-20 DIAGNOSIS — N81.6 RECTOCELE: ICD-10-CM

## 2025-08-20 DIAGNOSIS — M62.89 PELVIC FLOOR DYSFUNCTION IN FEMALE: Primary | ICD-10-CM

## 2025-08-20 DIAGNOSIS — K59.02 CONSTIPATION DUE TO OUTLET DYSFUNCTION: ICD-10-CM

## 2025-08-20 DIAGNOSIS — Z17.0 MALIGNANT NEOPLASM OF UPPER-OUTER QUADRANT OF RIGHT BREAST IN FEMALE, ESTROGEN RECEPTOR POSITIVE (HCC): ICD-10-CM

## 2025-08-20 DIAGNOSIS — C50.411 MALIGNANT NEOPLASM OF UPPER-OUTER QUADRANT OF RIGHT BREAST IN FEMALE, ESTROGEN RECEPTOR POSITIVE (HCC): ICD-10-CM

## 2025-08-20 DIAGNOSIS — Z12.11 SCREENING FOR COLON CANCER: ICD-10-CM

## 2025-08-20 PROCEDURE — 99204 OFFICE O/P NEW MOD 45 MIN: CPT | Performed by: COLON & RECTAL SURGERY
